# Patient Record
Sex: FEMALE | Race: WHITE | NOT HISPANIC OR LATINO
[De-identification: names, ages, dates, MRNs, and addresses within clinical notes are randomized per-mention and may not be internally consistent; named-entity substitution may affect disease eponyms.]

---

## 2017-09-21 ENCOUNTER — APPOINTMENT (OUTPATIENT)
Dept: INTERNAL MEDICINE | Facility: CLINIC | Age: 82
End: 2017-09-21
Payer: MEDICARE

## 2017-09-21 VITALS
SYSTOLIC BLOOD PRESSURE: 138 MMHG | HEIGHT: 63 IN | BODY MASS INDEX: 24.98 KG/M2 | HEART RATE: 85 BPM | WEIGHT: 141 LBS | OXYGEN SATURATION: 98 % | DIASTOLIC BLOOD PRESSURE: 79 MMHG | TEMPERATURE: 98.7 F | RESPIRATION RATE: 12 BRPM

## 2017-09-21 DIAGNOSIS — R32 UNSPECIFIED URINARY INCONTINENCE: ICD-10-CM

## 2017-09-21 PROCEDURE — 99203 OFFICE O/P NEW LOW 30 MIN: CPT

## 2017-09-21 RX ORDER — OLOPATADINE HCL 1 MG/ML
0.1 SOLUTION/ DROPS OPHTHALMIC
Qty: 5 | Refills: 0 | Status: ACTIVE | COMMUNITY
Start: 2017-06-15

## 2017-09-21 RX ORDER — LATANOPROST/PF 0.005 %
0.01 DROPS OPHTHALMIC (EYE)
Qty: 75 | Refills: 0 | Status: ACTIVE | COMMUNITY
Start: 2016-11-04

## 2018-02-06 ENCOUNTER — APPOINTMENT (OUTPATIENT)
Dept: INTERNAL MEDICINE | Facility: CLINIC | Age: 83
End: 2018-02-06
Payer: MEDICARE

## 2018-02-06 VITALS
TEMPERATURE: 97.9 F | HEART RATE: 87 BPM | BODY MASS INDEX: 24.8 KG/M2 | DIASTOLIC BLOOD PRESSURE: 80 MMHG | WEIGHT: 140 LBS | HEIGHT: 63 IN | RESPIRATION RATE: 14 BRPM | SYSTOLIC BLOOD PRESSURE: 142 MMHG | OXYGEN SATURATION: 97 %

## 2018-02-06 PROCEDURE — 99214 OFFICE O/P EST MOD 30 MIN: CPT | Mod: 25

## 2018-02-06 PROCEDURE — 36415 COLL VENOUS BLD VENIPUNCTURE: CPT

## 2018-02-09 ENCOUNTER — LABORATORY RESULT (OUTPATIENT)
Age: 83
End: 2018-02-09

## 2018-02-09 ENCOUNTER — APPOINTMENT (OUTPATIENT)
Dept: HEART AND VASCULAR | Facility: CLINIC | Age: 83
End: 2018-02-09
Payer: MEDICARE

## 2018-02-09 VITALS
DIASTOLIC BLOOD PRESSURE: 70 MMHG | TEMPERATURE: 97.8 F | OXYGEN SATURATION: 97 % | HEART RATE: 83 BPM | HEIGHT: 63 IN | SYSTOLIC BLOOD PRESSURE: 128 MMHG | WEIGHT: 140 LBS | BODY MASS INDEX: 24.8 KG/M2

## 2018-02-09 PROCEDURE — 93306 TTE W/DOPPLER COMPLETE: CPT | Mod: XE

## 2018-02-09 PROCEDURE — 99214 OFFICE O/P EST MOD 30 MIN: CPT | Mod: 25

## 2018-02-09 PROCEDURE — 93880 EXTRACRANIAL BILAT STUDY: CPT | Mod: XE

## 2018-02-09 PROCEDURE — 93000 ELECTROCARDIOGRAM COMPLETE: CPT

## 2018-02-09 RX ORDER — FLUTICASONE PROPIONATE 50 UG/1
50 SPRAY, METERED NASAL
Qty: 16 | Refills: 0 | Status: COMPLETED | COMMUNITY
Start: 2017-12-13

## 2018-02-09 RX ORDER — MELOXICAM 15 MG/1
15 TABLET ORAL
Qty: 30 | Refills: 0 | Status: COMPLETED | COMMUNITY
Start: 2017-08-11

## 2018-02-09 RX ORDER — AMOXICILLIN 875 MG/1
875 TABLET, FILM COATED ORAL
Qty: 14 | Refills: 0 | Status: COMPLETED | COMMUNITY
Start: 2017-12-13

## 2018-02-09 RX ORDER — HYDROCODONE BITARTRATE AND ACETAMINOPHEN 5; 325 MG/1; MG/1
5-325 TABLET ORAL
Qty: 5 | Refills: 0 | Status: COMPLETED | COMMUNITY
Start: 2017-11-16

## 2018-02-09 RX ORDER — CIPROFLOXACIN HYDROCHLORIDE 500 MG/1
500 TABLET, FILM COATED ORAL
Qty: 10 | Refills: 0 | Status: COMPLETED | COMMUNITY
Start: 2017-09-12

## 2018-02-12 LAB
ALBUMIN SERPL ELPH-MCNC: 4.1 G/DL
ALP BLD-CCNC: 66 U/L
ALT SERPL-CCNC: 12 U/L
ANION GAP SERPL CALC-SCNC: 16 MMOL/L
AST SERPL-CCNC: 18 U/L
BILIRUB SERPL-MCNC: 0.4 MG/DL
BUN SERPL-MCNC: 21 MG/DL
CALCIUM SERPL-MCNC: 10 MG/DL
CHLORIDE SERPL-SCNC: 102 MMOL/L
CHOLEST SERPL-MCNC: 232 MG/DL
CHOLEST/HDLC SERPL: 3.3 RATIO
CO2 SERPL-SCNC: 26 MMOL/L
CREAT SERPL-MCNC: 1.01 MG/DL
GLUCOSE SERPL-MCNC: 99 MG/DL
HDLC SERPL-MCNC: 71 MG/DL
LDLC SERPL CALC-MCNC: 140 MG/DL
POTASSIUM SERPL-SCNC: 4.2 MMOL/L
PROT SERPL-MCNC: 7 G/DL
SODIUM SERPL-SCNC: 144 MMOL/L
TRIGL SERPL-MCNC: 106 MG/DL
TSH SERPL-ACNC: 1.43 UIU/ML

## 2018-02-14 ENCOUNTER — APPOINTMENT (OUTPATIENT)
Dept: HEART AND VASCULAR | Facility: CLINIC | Age: 83
End: 2018-02-14
Payer: MEDICARE

## 2018-02-14 VITALS
HEIGHT: 63 IN | DIASTOLIC BLOOD PRESSURE: 60 MMHG | WEIGHT: 142 LBS | BODY MASS INDEX: 25.16 KG/M2 | HEART RATE: 91 BPM | SYSTOLIC BLOOD PRESSURE: 124 MMHG

## 2018-02-14 PROCEDURE — 93000 ELECTROCARDIOGRAM COMPLETE: CPT

## 2018-02-14 PROCEDURE — 99205 OFFICE O/P NEW HI 60 MIN: CPT | Mod: 25

## 2018-02-15 ENCOUNTER — APPOINTMENT (OUTPATIENT)
Dept: HEART AND VASCULAR | Facility: CLINIC | Age: 83
End: 2018-02-15
Payer: MEDICARE

## 2018-02-15 DIAGNOSIS — R06.02 SHORTNESS OF BREATH: ICD-10-CM

## 2018-02-15 PROCEDURE — 99214 OFFICE O/P EST MOD 30 MIN: CPT | Mod: 25

## 2018-02-15 PROCEDURE — 93015 CV STRESS TEST SUPVJ I&R: CPT

## 2018-02-15 PROCEDURE — A9500: CPT

## 2018-02-15 PROCEDURE — 78452 HT MUSCLE IMAGE SPECT MULT: CPT

## 2018-03-02 ENCOUNTER — TRANSCRIPTION ENCOUNTER (OUTPATIENT)
Age: 83
End: 2018-03-02

## 2018-03-02 ENCOUNTER — APPOINTMENT (OUTPATIENT)
Dept: CT IMAGING | Facility: HOSPITAL | Age: 83
End: 2018-03-02

## 2018-03-02 ENCOUNTER — INPATIENT (INPATIENT)
Facility: HOSPITAL | Age: 83
LOS: 0 days | Discharge: HOME | DRG: 274 | End: 2018-03-03
Attending: INTERNAL MEDICINE | Admitting: INTERNAL MEDICINE
Payer: MEDICARE

## 2018-03-02 VITALS
WEIGHT: 139.99 LBS | RESPIRATION RATE: 16 BRPM | HEART RATE: 94 BPM | SYSTOLIC BLOOD PRESSURE: 156 MMHG | HEIGHT: 64 IN | DIASTOLIC BLOOD PRESSURE: 72 MMHG | OXYGEN SATURATION: 99 %

## 2018-03-02 DIAGNOSIS — I49.3 VENTRICULAR PREMATURE DEPOLARIZATION: ICD-10-CM

## 2018-03-02 DIAGNOSIS — H40.9 UNSPECIFIED GLAUCOMA: ICD-10-CM

## 2018-03-02 LAB
ALBUMIN SERPL ELPH-MCNC: 3.9 G/DL — SIGNIFICANT CHANGE UP (ref 3.3–5)
ALP SERPL-CCNC: 62 U/L — SIGNIFICANT CHANGE UP (ref 40–120)
ALT FLD-CCNC: 9 U/L — LOW (ref 10–45)
ANION GAP SERPL CALC-SCNC: 12 MMOL/L — SIGNIFICANT CHANGE UP (ref 5–17)
APTT BLD: 28.9 SEC — SIGNIFICANT CHANGE UP (ref 27.5–37.4)
AST SERPL-CCNC: 15 U/L — SIGNIFICANT CHANGE UP (ref 10–40)
BILIRUB SERPL-MCNC: 0.7 MG/DL — SIGNIFICANT CHANGE UP (ref 0.2–1.2)
BUN SERPL-MCNC: 16 MG/DL — SIGNIFICANT CHANGE UP (ref 7–23)
CALCIUM SERPL-MCNC: 9.9 MG/DL — SIGNIFICANT CHANGE UP (ref 8.4–10.5)
CHLORIDE SERPL-SCNC: 101 MMOL/L — SIGNIFICANT CHANGE UP (ref 96–108)
CO2 SERPL-SCNC: 28 MMOL/L — SIGNIFICANT CHANGE UP (ref 22–31)
CREAT SERPL-MCNC: 0.89 MG/DL — SIGNIFICANT CHANGE UP (ref 0.5–1.3)
GLUCOSE SERPL-MCNC: 118 MG/DL — HIGH (ref 70–99)
HCT VFR BLD CALC: 41 % — SIGNIFICANT CHANGE UP (ref 34.5–45)
HGB BLD-MCNC: 13.4 G/DL — SIGNIFICANT CHANGE UP (ref 11.5–15.5)
INR BLD: 0.94 — SIGNIFICANT CHANGE UP (ref 0.88–1.16)
MCHC RBC-ENTMCNC: 30.5 PG — SIGNIFICANT CHANGE UP (ref 27–34)
MCHC RBC-ENTMCNC: 32.7 G/DL — SIGNIFICANT CHANGE UP (ref 32–36)
MCV RBC AUTO: 93.2 FL — SIGNIFICANT CHANGE UP (ref 80–100)
PLATELET # BLD AUTO: 156 K/UL — SIGNIFICANT CHANGE UP (ref 150–400)
POTASSIUM SERPL-MCNC: 4.1 MMOL/L — SIGNIFICANT CHANGE UP (ref 3.5–5.3)
POTASSIUM SERPL-SCNC: 4.1 MMOL/L — SIGNIFICANT CHANGE UP (ref 3.5–5.3)
PROT SERPL-MCNC: 7.3 G/DL — SIGNIFICANT CHANGE UP (ref 6–8.3)
PROTHROM AB SERPL-ACNC: 10.4 SEC — SIGNIFICANT CHANGE UP (ref 9.8–12.7)
RBC # BLD: 4.4 M/UL — SIGNIFICANT CHANGE UP (ref 3.8–5.2)
RBC # FLD: 13.9 % — SIGNIFICANT CHANGE UP (ref 10.3–16.9)
SODIUM SERPL-SCNC: 141 MMOL/L — SIGNIFICANT CHANGE UP (ref 135–145)
WBC # BLD: 7.4 K/UL — SIGNIFICANT CHANGE UP (ref 3.8–10.5)
WBC # FLD AUTO: 7.4 K/UL — SIGNIFICANT CHANGE UP (ref 3.8–10.5)

## 2018-03-02 PROCEDURE — 93653 COMPRE EP EVAL TX SVT: CPT

## 2018-03-02 PROCEDURE — 99223 1ST HOSP IP/OBS HIGH 75: CPT | Mod: 57

## 2018-03-02 PROCEDURE — 71250 CT THORAX DX C-: CPT | Mod: 26

## 2018-03-02 PROCEDURE — 93613 INTRACARDIAC EPHYS 3D MAPG: CPT

## 2018-03-02 PROCEDURE — 99223 1ST HOSP IP/OBS HIGH 75: CPT

## 2018-03-02 PROCEDURE — 93621 COMP EP EVL L PAC&REC C SINS: CPT | Mod: 26

## 2018-03-02 PROCEDURE — 33282: CPT

## 2018-03-02 RX ORDER — ASPIRIN/CALCIUM CARB/MAGNESIUM 325 MG
325 TABLET ORAL ONCE
Qty: 0 | Refills: 0 | Status: DISCONTINUED | OUTPATIENT
Start: 2018-03-02 | End: 2018-03-02

## 2018-03-02 RX ORDER — KETOTIFEN FUMARATE 0.34 MG/ML
1 SOLUTION OPHTHALMIC DAILY
Qty: 0 | Refills: 0 | Status: DISCONTINUED | OUTPATIENT
Start: 2018-03-02 | End: 2018-03-03

## 2018-03-02 RX ORDER — MEXILETINE HYDROCHLORIDE 150 MG/1
150 CAPSULE ORAL THREE TIMES A DAY
Qty: 0 | Refills: 0 | Status: DISCONTINUED | OUTPATIENT
Start: 2018-03-02 | End: 2018-03-03

## 2018-03-02 RX ORDER — MEXILETINE HYDROCHLORIDE 150 MG/1
1 CAPSULE ORAL
Qty: 90 | Refills: 1
Start: 2018-03-02 | End: 2018-04-30

## 2018-03-02 RX ORDER — METOPROLOL TARTRATE 50 MG
1 TABLET ORAL
Qty: 0 | Refills: 0 | COMMUNITY

## 2018-03-02 RX ORDER — METOPROLOL TARTRATE 50 MG
1 TABLET ORAL
Qty: 30 | Refills: 2
Start: 2018-03-02 | End: 2018-05-30

## 2018-03-02 RX ORDER — METOPROLOL TARTRATE 50 MG
50 TABLET ORAL DAILY
Qty: 0 | Refills: 0 | Status: DISCONTINUED | OUTPATIENT
Start: 2018-03-02 | End: 2018-03-03

## 2018-03-02 RX ORDER — LATANOPROST 0.05 MG/ML
1 SOLUTION/ DROPS OPHTHALMIC; TOPICAL ONCE
Qty: 0 | Refills: 0 | Status: COMPLETED | OUTPATIENT
Start: 2018-03-02 | End: 2018-03-02

## 2018-03-02 RX ORDER — ASPIRIN/CALCIUM CARB/MAGNESIUM 324 MG
325 TABLET ORAL ONCE
Qty: 0 | Refills: 0 | Status: COMPLETED | OUTPATIENT
Start: 2018-03-02 | End: 2018-03-02

## 2018-03-02 RX ORDER — METOPROLOL TARTRATE 50 MG
25 TABLET ORAL ONCE
Qty: 0 | Refills: 0 | Status: DISCONTINUED | OUTPATIENT
Start: 2018-03-02 | End: 2018-03-02

## 2018-03-02 RX ORDER — ASPIRIN/CALCIUM CARB/MAGNESIUM 324 MG
81 TABLET ORAL ONCE
Qty: 0 | Refills: 0 | Status: DISCONTINUED | OUTPATIENT
Start: 2018-03-02 | End: 2018-03-02

## 2018-03-02 RX ORDER — ASPIRIN/CALCIUM CARB/MAGNESIUM 324 MG
81 TABLET ORAL DAILY
Qty: 0 | Refills: 0 | Status: DISCONTINUED | OUTPATIENT
Start: 2018-03-03 | End: 2018-03-03

## 2018-03-02 RX ADMIN — Medication 325 MILLIGRAM(S): at 18:03

## 2018-03-02 RX ADMIN — Medication 50 MILLIGRAM(S): at 18:03

## 2018-03-02 RX ADMIN — KETOTIFEN FUMARATE 1 DROP(S): 0.34 SOLUTION OPHTHALMIC at 18:04

## 2018-03-02 RX ADMIN — LATANOPROST 1 DROP(S): 0.05 SOLUTION/ DROPS OPHTHALMIC; TOPICAL at 21:40

## 2018-03-02 RX ADMIN — MEXILETINE HYDROCHLORIDE 150 MILLIGRAM(S): 150 CAPSULE ORAL at 21:40

## 2018-03-02 NOTE — DISCHARGE NOTE ADULT - PLAN OF CARE
Follow up with Dr. Horvath on 4/11/18 at 11 AM You had an Electrophysiology study on 3/2/18 that didn't show dangerous heart rhythm or conduction disease that could cause your fainting spells.  An Implanted Loop Recorder was implanted for long term heart rhythm monitor to aid with diagnosis of your fainting spells.  There was attempt to ablate the PVC (early heart beats) but was unsuccessful.  Dr. Horvath would like to start a heart rhythm medication call Mexilitine to decrease the PVC burden.  This medication is to be taken three times a day.      Wound care instruction:  Avoid strenuous activities such as lifting, running, pushing or sex for 1 week in order to avoid bleeding complications in the groin wounds.  If any questions about the groin, call us at [phone].  Ok to shower tonight.  Avoid bathing for 1 week    Follow up with DR. Horvath on 4/11/18 at 11 AM You now have a Loop Recorder implanted that can help check your heart rhythm.   There is a layer of glue on the small wound (on the chest wall). Let the glue fall off on its own. Do no peel it off. You had an Electrophysiology study on 3/2/18 that didn't show dangerous heart rhythm or conduction disease that could cause your fainting spells.  An Implanted Loop Recorder was implanted for long term heart rhythm monitor to aid with diagnosis of your fainting spells.  There was attempt to ablate the PVC (early heart beats) but was unsuccessful.  Dr. Horvath would like to start a heart rhythm medication call Mexilitine to decrease the PVC burden.  This medication is to be taken three times a day.    Also, Metoprolol was doubled from 25 mg to 50 mg once daily.   ** Prescriptions are sent electronically to [facility].   Wound care instruction:  Avoid strenuous activities such as lifting, running, pushing or sex for 1 week in order to avoid bleeding complications in the groin wounds.  If any questions about the groin, call us at [phone].  Ok to shower tonight.  Avoid bathing for 1 week    Follow up with DR. Horvath on 4/11/18 at 11 AM

## 2018-03-02 NOTE — DISCHARGE NOTE ADULT - MEDICATION SUMMARY - MEDICATIONS TO TAKE
I will START or STAY ON the medications listed below when I get home from the hospital:    Aspirin Enteric Coated 81 mg oral delayed release tablet  -- 1 tab(s) by mouth once a day  -- Indication: For heart    mexiletine 150 mg oral capsule  -- 1 cap(s) by mouth 3 times a day  ** new med  -- Indication: For for arrhythmia    metoprolol succinate 50 mg oral tablet, extended release  -- 1 tab(s) by mouth once a day  ** dose increased   -- Indication: For heart rate    latanoprost 0.005% ophthalmic solution  -- 1 drop(s) to each affected eye once a day (in the evening)  -- Indication: For eyes

## 2018-03-02 NOTE — DISCHARGE NOTE ADULT - CARE PLAN
Principal Discharge DX:	PVC (premature ventricular contraction)  Goal:	Follow up with Dr. Horvath on 4/11/18 at 11 AM  Assessment and plan of treatment:	You had an Electrophysiology study on 3/2/18 that didn't show dangerous heart rhythm or conduction disease that could cause your fainting spells.  An Implanted Loop Recorder was implanted for long term heart rhythm monitor to aid with diagnosis of your fainting spells.  There was attempt to ablate the PVC (early heart beats) but was unsuccessful.  Dr. Horvath would like to start a heart rhythm medication call Mexilitine to decrease the PVC burden.  This medication is to be taken three times a day.      Wound care instruction:  Avoid strenuous activities such as lifting, running, pushing or sex for 1 week in order to avoid bleeding complications in the groin wounds.  If any questions about the groin, call us at [phone].  Ok to shower tonight.  Avoid bathing for 1 week    Follow up with DR. Horvath on 4/11/18 at 11 AM  Secondary Diagnosis:	Syncope and collapse  Assessment and plan of treatment:	You now have a Loop Recorder implanted that can help check your heart rhythm.   There is a layer of glue on the small wound (on the chest wall). Let the glue fall off on its own. Do no peel it off. Principal Discharge DX:	PVC (premature ventricular contraction)  Goal:	Follow up with Dr. Horvath on 4/11/18 at 11 AM  Assessment and plan of treatment:	You had an Electrophysiology study on 3/2/18 that didn't show dangerous heart rhythm or conduction disease that could cause your fainting spells.  An Implanted Loop Recorder was implanted for long term heart rhythm monitor to aid with diagnosis of your fainting spells.  There was attempt to ablate the PVC (early heart beats) but was unsuccessful.  Dr. Horvath would like to start a heart rhythm medication call Mexilitine to decrease the PVC burden.  This medication is to be taken three times a day.    Also, Metoprolol was doubled from 25 mg to 50 mg once daily.   ** Prescriptions are sent electronically to [facility].   Wound care instruction:  Avoid strenuous activities such as lifting, running, pushing or sex for 1 week in order to avoid bleeding complications in the groin wounds.  If any questions about the groin, call us at [phone].  Ok to shower tonight.  Avoid bathing for 1 week    Follow up with DR. Horvath on 4/11/18 at 11 AM  Secondary Diagnosis:	Syncope and collapse  Assessment and plan of treatment:	You now have a Loop Recorder implanted that can help check your heart rhythm.   There is a layer of glue on the small wound (on the chest wall). Let the glue fall off on its own. Do no peel it off.

## 2018-03-02 NOTE — DISCHARGE NOTE ADULT - CARE PROVIDER_API CALL
Buddy Horvath), Cardiac Electrophysiology; Cardiology; Cardiovascular Disease  [address] [place] [address]  Phone: [phone]  Fax: [phone]

## 2018-03-02 NOTE — H&P ADULT - PMH
Cerebral aneurysm    Glaucoma    Osteoarthritis    PVC (premature ventricular contraction)    Syncope and collapse

## 2018-03-02 NOTE — H&P ADULT - PROBLEM SELECTOR PLAN 1
- Patient presents today for procedure as above.   - Continue home meds, Metoprolol Succ ER 25mg Daily. - Patient presents today for procedure as above.   - Increase home Toprol to 50mg Daily, continue this dose on discharge.  - Start Mexiletine 150mg TID. Continue on d/c  - ASA 325mg on day of procedure, resume home dose ASA 81mg daily upon d/c.

## 2018-03-02 NOTE — DISCHARGE NOTE ADULT - HOSPITAL COURSE
83 F with OA, glaucoma, cerebral aneurysm (s/p stenting, follows with neurosurgery), incontinence, SOB, and syncope. The patient has experienced LOC several times, most recently 2-3 weeks ago she struck her face when falling. No prodrome. Holter monitor outpatient showed PVCs. She presented here for elective EPS and ablation and Loop Recorder implant 3/2:  s/p EPS that was negative for arrhythmia or conduction disease.  PVC ablation was unsuccessful. Loop Recorder (LINQ) was implanted. She was started on Mexilitine 150mg TID and  increased on Toprol to 50mg daily. Continues on ASA. f/u with DR. Horvath on 4/11/18 at 11 AM

## 2018-03-02 NOTE — H&P ADULT - ASSESSMENT
Patient is a pleasant 82yo Female with PMH of OA, glaucoma (s/p repair surgery), cerebral aneurysm (s/p stenting, follows with neurosurgery), incontinence, SOB, and syncope. The patient has experienced syncope and collapse several times, most recently 2-3 weeks ago she struck her face when falling. She has no prodrome. The patient endorses knee and back pain recently that she attributes to her arthritis. She wore a Holter monitor which showed PVCs. The patient denies fever, chills, palpitations, diarrhea, nausea, vomiting. The patient presents today for [employer] Mapping, possible PVC ablation/ loop recorder implant/ PPM implant.     - Procedures discussed in detail with all patients. Risks, benefits, and alternatives were reviewed. Informed consent was signed in the office. All questions were answered. The patient wishes to proceed as planned. Patient is a pleasant 84yo Female with PMH of OA, glaucoma (s/p repair surgery), cerebral aneurysm (s/p stenting, follows with neurosurgery), incontinence, SOB, and syncope. The patient has experienced syncope and collapse several times, most recently 2-3 weeks ago she struck her face when falling. She has no prodrome. The patient endorses knee and back pain recently that she attributes to her arthritis. She wore a Holter monitor which showed PVCs. The patient denies fever, chills, palpitations, diarrhea, nausea, vomiting. The patient presents today for CarioInsight Mapping, possible PVC ablation/ loop recorder implant/ PPM implant.     - Procedures were discussed in detail with patients. Risks, benefits, and alternatives were reviewed. Informed consent was signed in the office. All questions were answered. The patient wishes to proceed as planned.

## 2018-03-02 NOTE — H&P ADULT - HISTORY OF PRESENT ILLNESS
Patient is a pleasant 82yo Female with PMH of OA, glaucoma (s/p repair surgery), cerebral aneurysm (s/p stenting, follows with neurosurgery), incontinence, SOB, and syncope. The patient has experienced syncope and collapse several times, most recently 2-3 weeks ago she struck her face when falling. She has no prodrome. The patient endorses knee and back pain recently that she attributes to her arthritis. She wore a Holter monitor which showed PVCs. The patient denies fever, chills, palpitations, diarrhea, nausea, vomiting.

## 2018-03-02 NOTE — DISCHARGE NOTE ADULT - PATIENT PORTAL LINK FT
You can access the [facility] Patient Portal, offered by [facility], by registering with the following website: [email or web address]

## 2018-03-02 NOTE — DISCHARGE NOTE ADULT - MEDICATION SUMMARY - MEDICATIONS TO CHANGE
I will SWITCH the dose or number of times a day I take the medications listed below when I get home from the hospital:    Metoprolol Succinate ER 25 mg oral tablet, extended release  -- 1 tab(s) by mouth once a day

## 2018-03-03 VITALS — TEMPERATURE: 97 F

## 2018-03-03 PROCEDURE — 99239 HOSP IP/OBS DSCHRG MGMT >30: CPT

## 2018-03-03 PROCEDURE — 99238 HOSP IP/OBS DSCHRG MGMT 30/<: CPT

## 2018-03-03 RX ORDER — OLOPATADINE HYDROCHLORIDE 1 MG/ML
1 SOLUTION/ DROPS OPHTHALMIC
Qty: 0 | Refills: 0 | COMMUNITY

## 2018-03-03 RX ORDER — INFLUENZA VIRUS VACCINE 15; 15; 15; 15 UG/.5ML; UG/.5ML; UG/.5ML; UG/.5ML
0.5 SUSPENSION INTRAMUSCULAR ONCE
Qty: 0 | Refills: 0 | Status: COMPLETED | OUTPATIENT
Start: 2018-03-03 | End: 2018-03-03

## 2018-03-03 RX ADMIN — Medication 81 MILLIGRAM(S): at 11:44

## 2018-03-03 RX ADMIN — KETOTIFEN FUMARATE 1 DROP(S): 0.34 SOLUTION OPHTHALMIC at 14:10

## 2018-03-03 RX ADMIN — MEXILETINE HYDROCHLORIDE 150 MILLIGRAM(S): 150 CAPSULE ORAL at 14:10

## 2018-03-03 RX ADMIN — MEXILETINE HYDROCHLORIDE 150 MILLIGRAM(S): 150 CAPSULE ORAL at 05:52

## 2018-03-03 RX ADMIN — INFLUENZA VIRUS VACCINE 0.5 MILLILITER(S): 15; 15; 15; 15 SUSPENSION INTRAMUSCULAR at 13:57

## 2018-03-03 RX ADMIN — Medication 50 MILLIGRAM(S): at 05:52

## 2018-03-06 PROCEDURE — C1732: CPT

## 2018-03-06 PROCEDURE — 86850 RBC ANTIBODY SCREEN: CPT

## 2018-03-06 PROCEDURE — C1764: CPT

## 2018-03-06 PROCEDURE — C1730: CPT

## 2018-03-06 PROCEDURE — C1894: CPT

## 2018-03-06 PROCEDURE — 86901 BLOOD TYPING SEROLOGIC RH(D): CPT

## 2018-03-06 PROCEDURE — 86900 BLOOD TYPING SEROLOGIC ABO: CPT

## 2018-03-06 PROCEDURE — 85730 THROMBOPLASTIN TIME PARTIAL: CPT

## 2018-03-06 PROCEDURE — 85027 COMPLETE CBC AUTOMATED: CPT

## 2018-03-06 PROCEDURE — 71250 CT THORAX DX C-: CPT

## 2018-03-06 PROCEDURE — 80053 COMPREHEN METABOLIC PANEL: CPT

## 2018-03-06 PROCEDURE — 90662 IIV NO PRSV INCREASED AG IM: CPT

## 2018-03-06 PROCEDURE — C1759: CPT

## 2018-03-06 PROCEDURE — 85610 PROTHROMBIN TIME: CPT

## 2018-03-12 DIAGNOSIS — I49.3 VENTRICULAR PREMATURE DEPOLARIZATION: ICD-10-CM

## 2018-03-12 DIAGNOSIS — I47.2 VENTRICULAR TACHYCARDIA: ICD-10-CM

## 2018-03-12 DIAGNOSIS — I10 ESSENTIAL (PRIMARY) HYPERTENSION: ICD-10-CM

## 2018-03-12 DIAGNOSIS — H40.9 UNSPECIFIED GLAUCOMA: ICD-10-CM

## 2018-03-12 DIAGNOSIS — R55 SYNCOPE AND COLLAPSE: ICD-10-CM

## 2018-03-12 DIAGNOSIS — M15.9 POLYOSTEOARTHRITIS, UNSPECIFIED: ICD-10-CM

## 2018-04-11 ENCOUNTER — APPOINTMENT (OUTPATIENT)
Dept: HEART AND VASCULAR | Facility: CLINIC | Age: 83
End: 2018-04-11
Payer: MEDICARE

## 2018-04-11 VITALS
HEIGHT: 63 IN | SYSTOLIC BLOOD PRESSURE: 145 MMHG | DIASTOLIC BLOOD PRESSURE: 63 MMHG | BODY MASS INDEX: 24.8 KG/M2 | HEART RATE: 70 BPM | WEIGHT: 140 LBS

## 2018-04-11 PROBLEM — M19.90 UNSPECIFIED OSTEOARTHRITIS, UNSPECIFIED SITE: Chronic | Status: ACTIVE | Noted: 2018-03-02

## 2018-04-11 PROBLEM — I49.3 VENTRICULAR PREMATURE DEPOLARIZATION: Chronic | Status: ACTIVE | Noted: 2018-03-02

## 2018-04-11 PROBLEM — R55 SYNCOPE AND COLLAPSE: Chronic | Status: ACTIVE | Noted: 2018-03-02

## 2018-04-11 PROBLEM — H40.9 UNSPECIFIED GLAUCOMA: Chronic | Status: ACTIVE | Noted: 2018-03-02

## 2018-04-11 PROBLEM — I67.1 CEREBRAL ANEURYSM, NONRUPTURED: Chronic | Status: ACTIVE | Noted: 2018-03-02

## 2018-04-11 PROCEDURE — 93291 INTERROG DEV EVAL SCRMS IP: CPT

## 2018-04-16 ENCOUNTER — FORM ENCOUNTER (OUTPATIENT)
Age: 83
End: 2018-04-16

## 2018-04-16 ENCOUNTER — APPOINTMENT (OUTPATIENT)
Dept: INTERNAL MEDICINE | Facility: CLINIC | Age: 83
End: 2018-04-16
Payer: MEDICARE

## 2018-04-16 VITALS
SYSTOLIC BLOOD PRESSURE: 120 MMHG | BODY MASS INDEX: 24.8 KG/M2 | WEIGHT: 140 LBS | HEART RATE: 91 BPM | TEMPERATURE: 97.9 F | DIASTOLIC BLOOD PRESSURE: 75 MMHG | HEIGHT: 63 IN | OXYGEN SATURATION: 94 % | RESPIRATION RATE: 14 BRPM

## 2018-04-16 DIAGNOSIS — R29.6 REPEATED FALLS: ICD-10-CM

## 2018-04-16 PROCEDURE — 99214 OFFICE O/P EST MOD 30 MIN: CPT

## 2018-04-17 ENCOUNTER — OUTPATIENT (OUTPATIENT)
Dept: OUTPATIENT SERVICES | Facility: HOSPITAL | Age: 83
LOS: 1 days | End: 2018-04-17
Payer: MEDICARE

## 2018-04-17 PROCEDURE — 73521 X-RAY EXAM HIPS BI 2 VIEWS: CPT | Mod: 26

## 2018-04-17 PROCEDURE — 73521 X-RAY EXAM HIPS BI 2 VIEWS: CPT

## 2018-10-24 ENCOUNTER — APPOINTMENT (OUTPATIENT)
Dept: HEART AND VASCULAR | Facility: CLINIC | Age: 83
End: 2018-10-24
Payer: MEDICARE

## 2018-10-24 VITALS
HEIGHT: 63 IN | SYSTOLIC BLOOD PRESSURE: 146 MMHG | BODY MASS INDEX: 24.8 KG/M2 | WEIGHT: 140 LBS | DIASTOLIC BLOOD PRESSURE: 67 MMHG | HEART RATE: 76 BPM

## 2018-10-24 PROCEDURE — 93291 INTERROG DEV EVAL SCRMS IP: CPT

## 2018-10-24 PROCEDURE — 99214 OFFICE O/P EST MOD 30 MIN: CPT | Mod: 25

## 2018-11-29 ENCOUNTER — MESSAGE (OUTPATIENT)
Age: 83
End: 2018-11-29

## 2019-01-03 ENCOUNTER — APPOINTMENT (OUTPATIENT)
Dept: VASCULAR SURGERY | Facility: CLINIC | Age: 84
End: 2019-01-03
Payer: MEDICARE

## 2019-01-03 PROCEDURE — 93880 EXTRACRANIAL BILAT STUDY: CPT

## 2019-01-16 ENCOUNTER — RX RENEWAL (OUTPATIENT)
Age: 84
End: 2019-01-16

## 2019-01-16 ENCOUNTER — APPOINTMENT (OUTPATIENT)
Dept: INTERNAL MEDICINE | Facility: CLINIC | Age: 84
End: 2019-01-16
Payer: MEDICARE

## 2019-01-16 VITALS
TEMPERATURE: 97.7 F | BODY MASS INDEX: 24.45 KG/M2 | WEIGHT: 138 LBS | SYSTOLIC BLOOD PRESSURE: 148 MMHG | DIASTOLIC BLOOD PRESSURE: 80 MMHG | OXYGEN SATURATION: 95 % | HEIGHT: 63 IN | HEART RATE: 98 BPM

## 2019-01-16 DIAGNOSIS — R55 SYNCOPE AND COLLAPSE: ICD-10-CM

## 2019-01-16 DIAGNOSIS — I49.3 VENTRICULAR PREMATURE DEPOLARIZATION: ICD-10-CM

## 2019-01-16 PROCEDURE — 99214 OFFICE O/P EST MOD 30 MIN: CPT

## 2019-01-16 NOTE — PLAN
[FreeTextEntry1] : \par \par BP HR elevated, inc metoprolol to 50 mg daily\par \par referral for [facility] for knee pain and back pain evaluation\par  send for xray left kne\par tylenol prn for pain

## 2019-01-16 NOTE — PHYSICAL EXAM
[No Acute Distress] : no acute distress [Well Nourished] : well nourished [Well Developed] : well developed [Supple] : supple [No Lymphadenopathy] : no lymphadenopathy [No Respiratory Distress] : no respiratory distress  [Clear to Auscultation] : lungs were clear to auscultation bilaterally [No Accessory Muscle Use] : no accessory muscle use [Normal Rate] : normal rate  [Regular Rhythm] : with a regular rhythm [Normal S1, S2] : normal S1 and S2 [No CVA Tenderness] : no CVA  tenderness [No Spinal Tenderness] : no spinal tenderness [No Joint Swelling] : no joint swelling [Grossly Normal Strength/Tone] : grossly normal strength/tone [No Skin Lesions] : no skin lesions

## 2019-01-16 NOTE — HISTORY OF PRESENT ILLNESS
[FreeTextEntry1] : left knee pain [de-identified] : Saw Dr Nelson for back pain and also with knee pain Was not helpful.  TOld she had a vertebral fracture. \par left knee pain or months - Has a hx of torn menixscus with pt years ago. \par Not taking any pain medications\par \par

## 2019-01-17 ENCOUNTER — FORM ENCOUNTER (OUTPATIENT)
Age: 84
End: 2019-01-17

## 2019-01-18 ENCOUNTER — OUTPATIENT (OUTPATIENT)
Dept: OUTPATIENT SERVICES | Facility: HOSPITAL | Age: 84
LOS: 1 days | End: 2019-01-18
Payer: MEDICARE

## 2019-01-18 PROCEDURE — 73562 X-RAY EXAM OF KNEE 3: CPT

## 2019-01-18 PROCEDURE — 73562 X-RAY EXAM OF KNEE 3: CPT | Mod: 26,LT

## 2019-03-04 ENCOUNTER — RX RENEWAL (OUTPATIENT)
Age: 84
End: 2019-03-04

## 2019-04-17 ENCOUNTER — APPOINTMENT (OUTPATIENT)
Dept: INTERNAL MEDICINE | Facility: CLINIC | Age: 84
End: 2019-04-17
Payer: MEDICARE

## 2019-04-17 VITALS
HEART RATE: 75 BPM | HEIGHT: 63 IN | DIASTOLIC BLOOD PRESSURE: 60 MMHG | BODY MASS INDEX: 24.1 KG/M2 | TEMPERATURE: 98.6 F | WEIGHT: 136 LBS | OXYGEN SATURATION: 95 % | RESPIRATION RATE: 14 BRPM | SYSTOLIC BLOOD PRESSURE: 140 MMHG

## 2019-04-17 PROCEDURE — 99397 PER PM REEVAL EST PAT 65+ YR: CPT

## 2019-04-17 PROCEDURE — 93000 ELECTROCARDIOGRAM COMPLETE: CPT

## 2019-04-17 PROCEDURE — 36415 COLL VENOUS BLD VENIPUNCTURE: CPT

## 2019-04-18 NOTE — HISTORY OF PRESENT ILLNESS
[de-identified] : Recent left eye hemorrhage - seeing ophtho - vision mildly affected. \par Still with back pain and knee pain.  Wearing brace daily.  PT did not help much.  \par Has lost4 lbs.- not trying to lose weight.  Eating a lot.  \par Using a cane for walking. \par  [FreeTextEntry1] : wellness

## 2019-04-18 NOTE — REVIEW OF SYSTEMS
[Muscle Pain] : muscle pain [Joint Pain] : joint pain [Joint Stiffness] : joint stiffness [Back Pain] : back pain [Negative] : Psychiatric

## 2019-04-18 NOTE — PHYSICAL EXAM
[Well Developed] : well developed [No Acute Distress] : no acute distress [Well Nourished] : well nourished [No Lymphadenopathy] : no lymphadenopathy [Supple] : supple [No Respiratory Distress] : no respiratory distress  [Clear to Auscultation] : lungs were clear to auscultation bilaterally [Normal Rate] : normal rate  [No Accessory Muscle Use] : no accessory muscle use [Regular Rhythm] : with a regular rhythm [Normal S1, S2] : normal S1 and S2 [No Spinal Tenderness] : no spinal tenderness [No CVA Tenderness] : no CVA  tenderness [No Joint Swelling] : no joint swelling [Grossly Normal Strength/Tone] : grossly normal strength/tone [No Skin Lesions] : no skin lesions

## 2019-04-18 NOTE — PLAN
[FreeTextEntry1] : stephanie complete\par labs today\par try using the stationary bike and monitor her knee\par \par Cv:following with Dr SHARIF routinely, no further syncopal episodes\par \par

## 2019-04-21 LAB
ALBUMIN SERPL ELPH-MCNC: 4.1 G/DL
ALP BLD-CCNC: 75 U/L
ALT SERPL-CCNC: 13 U/L
ANION GAP SERPL CALC-SCNC: 13 MMOL/L
APPEARANCE: ABNORMAL
AST SERPL-CCNC: 18 U/L
BASOPHILS # BLD AUTO: 0.06 K/UL
BASOPHILS NFR BLD AUTO: 0.9 %
BILIRUB SERPL-MCNC: 0.3 MG/DL
BILIRUBIN URINE: NEGATIVE
BLOOD URINE: NEGATIVE
BUN SERPL-MCNC: 17 MG/DL
CALCIUM SERPL-MCNC: 9.6 MG/DL
CHLORIDE SERPL-SCNC: 103 MMOL/L
CHOLEST SERPL-MCNC: 217 MG/DL
CHOLEST/HDLC SERPL: 3.2 RATIO
CO2 SERPL-SCNC: 28 MMOL/L
COLOR: YELLOW
CREAT SERPL-MCNC: 0.92 MG/DL
EOSINOPHIL # BLD AUTO: 0.2 K/UL
EOSINOPHIL NFR BLD AUTO: 3 %
ESTIMATED AVERAGE GLUCOSE: 117 MG/DL
GLUCOSE QUALITATIVE U: NEGATIVE
GLUCOSE SERPL-MCNC: 90 MG/DL
HBA1C MFR BLD HPLC: 5.7 %
HCT VFR BLD CALC: 41.6 %
HDLC SERPL-MCNC: 68 MG/DL
HGB BLD-MCNC: 12.7 G/DL
IMM GRANULOCYTES NFR BLD AUTO: 0.3 %
KETONES URINE: NEGATIVE
LDLC SERPL CALC-MCNC: 134 MG/DL
LEUKOCYTE ESTERASE URINE: ABNORMAL
LYMPHOCYTES # BLD AUTO: 2.16 K/UL
LYMPHOCYTES NFR BLD AUTO: 32.1 %
MAN DIFF?: NORMAL
MCHC RBC-ENTMCNC: 30.2 PG
MCHC RBC-ENTMCNC: 30.5 GM/DL
MCV RBC AUTO: 98.8 FL
MONOCYTES # BLD AUTO: 0.52 K/UL
MONOCYTES NFR BLD AUTO: 7.7 %
NEUTROPHILS # BLD AUTO: 3.77 K/UL
NEUTROPHILS NFR BLD AUTO: 56 %
NITRITE URINE: NEGATIVE
PH URINE: 5.5
PLATELET # BLD AUTO: 186 K/UL
POTASSIUM SERPL-SCNC: 3.7 MMOL/L
PROT SERPL-MCNC: 7.1 G/DL
PROTEIN URINE: NEGATIVE
RBC # BLD: 4.21 M/UL
RBC # FLD: 14.1 %
SODIUM SERPL-SCNC: 143 MMOL/L
SPECIFIC GRAVITY URINE: 1.01
TRIGL SERPL-MCNC: 73 MG/DL
UROBILINOGEN URINE: NORMAL
VIT B12 SERPL-MCNC: 232 PG/ML
WBC # FLD AUTO: 6.73 K/UL

## 2019-06-25 ENCOUNTER — APPOINTMENT (OUTPATIENT)
Dept: INTERNAL MEDICINE | Facility: CLINIC | Age: 84
End: 2019-06-25
Payer: MEDICARE

## 2019-06-25 VITALS
SYSTOLIC BLOOD PRESSURE: 122 MMHG | HEIGHT: 63 IN | RESPIRATION RATE: 14 BRPM | HEART RATE: 85 BPM | TEMPERATURE: 98.3 F | BODY MASS INDEX: 21.26 KG/M2 | OXYGEN SATURATION: 96 % | WEIGHT: 120 LBS | DIASTOLIC BLOOD PRESSURE: 76 MMHG

## 2019-06-25 DIAGNOSIS — R10.84 GENERALIZED ABDOMINAL PAIN: ICD-10-CM

## 2019-06-25 DIAGNOSIS — R63.4 ABNORMAL WEIGHT LOSS: ICD-10-CM

## 2019-06-25 PROCEDURE — 99214 OFFICE O/P EST MOD 30 MIN: CPT

## 2019-06-25 NOTE — PLAN
[FreeTextEntry1] : Back pain- lumbar fracture-  send for PMR eval with Dr Boston\par \par Abd pain and weight loss - etiology unclear - send for CT a/p  \par \par

## 2019-06-25 NOTE — HISTORY OF PRESENT ILLNESS
[de-identified] : Primary complaint of right leg pain. Begin right upper buttocks and travels down the leg.  Trouble walking more than a block.  Legs cramp. Wearing compressions stockings.  \par \par Stomach pain as well - intermittent - not related to meals.   no diarrhea.  no constipation.  \par Recent weight loss.  over 14 lbs.    over 6 months [FreeTextEntry1] : leg cramps

## 2019-06-25 NOTE — REVIEW OF SYSTEMS
[Fever] : no fever [Chills] : no chills [Fatigue] : fatigue [Recent Change In Weight] : ~T recent weight change [Night Sweats] : no night sweats [Joint Pain] : joint pain [Back Pain] : back pain [Negative] : Respiratory

## 2019-06-25 NOTE — PHYSICAL EXAM
[No Acute Distress] : no acute distress [Well Nourished] : well nourished [Supple] : supple [No Lymphadenopathy] : no lymphadenopathy [Soft] : abdomen soft [Non-distended] : non-distended [No Masses] : no abdominal mass palpated [Normal Bowel Sounds] : normal bowel sounds [No HSM] : no HSM [Normal Affect] : the affect was normal [Normal Insight/Judgement] : insight and judgment were intact [de-identified] : trace edema b/l le [de-identified] : mild diffuse td [de-identified] : dec rom at hip and knees  [de-identified] : lumar td present [de-identified] : slow halting gait

## 2019-07-02 ENCOUNTER — RX RENEWAL (OUTPATIENT)
Age: 84
End: 2019-07-02

## 2019-08-08 ENCOUNTER — MESSAGE (OUTPATIENT)
Age: 84
End: 2019-08-08

## 2019-09-05 ENCOUNTER — APPOINTMENT (OUTPATIENT)
Dept: PHYSICAL MEDICINE AND REHAB | Facility: CLINIC | Age: 84
End: 2019-09-05
Payer: MEDICARE

## 2019-09-05 VITALS
OXYGEN SATURATION: 99 % | BODY MASS INDEX: 21.26 KG/M2 | HEART RATE: 80 BPM | WEIGHT: 120 LBS | HEIGHT: 63 IN | RESPIRATION RATE: 16 BRPM

## 2019-09-05 DIAGNOSIS — M70.61 TROCHANTERIC BURSITIS, RIGHT HIP: ICD-10-CM

## 2019-09-05 DIAGNOSIS — Z86.79 PERSONAL HISTORY OF OTHER DISEASES OF THE CIRCULATORY SYSTEM: ICD-10-CM

## 2019-09-05 DIAGNOSIS — M17.11 UNILATERAL PRIMARY OSTEOARTHRITIS, RIGHT KNEE: ICD-10-CM

## 2019-09-05 DIAGNOSIS — Z86.39 PERSONAL HISTORY OF OTHER ENDOCRINE, NUTRITIONAL AND METABOLIC DISEASE: ICD-10-CM

## 2019-09-05 DIAGNOSIS — Z87.39 PERSONAL HISTORY OF OTHER DISEASES OF THE MUSCULOSKELETAL SYSTEM AND CONNECTIVE TISSUE: ICD-10-CM

## 2019-09-05 PROCEDURE — 99204 OFFICE O/P NEW MOD 45 MIN: CPT

## 2019-10-23 ENCOUNTER — APPOINTMENT (OUTPATIENT)
Dept: HEART AND VASCULAR | Facility: CLINIC | Age: 84
End: 2019-10-23
Payer: MEDICARE

## 2019-10-23 VITALS
SYSTOLIC BLOOD PRESSURE: 140 MMHG | BODY MASS INDEX: 21.97 KG/M2 | HEIGHT: 63 IN | HEART RATE: 77 BPM | WEIGHT: 124 LBS | DIASTOLIC BLOOD PRESSURE: 77 MMHG

## 2019-10-23 PROCEDURE — 99214 OFFICE O/P EST MOD 30 MIN: CPT | Mod: 25

## 2019-10-23 PROCEDURE — 93291 INTERROG DEV EVAL SCRMS IP: CPT

## 2019-10-23 NOTE — REVIEW OF SYSTEMS
[see HPI] : see HPI [Negative] : Psychiatric [Fever] : no fever [Feeling Fatigued] : not feeling fatigued [Chills] : no chills [Dizziness] : no dizziness [Convulsions] : no convulsions [Tremor] : no tremor was seen

## 2019-10-23 NOTE — REASON FOR VISIT
[Follow-up Device Check] : follow-up device check visit [Follow-Up - Clinic] : a clinic follow-up of [Syncope] : syncope

## 2019-10-23 NOTE — CARDIOLOGY SUMMARY
[___] : [unfilled] [No Ischemia] : no Ischemia [LVEF ___%] : LVEF [unfilled]% [None] : no pulmonary hypertension [Mild] : mild mitral regurgitation [Normal] : normal LA size

## 2019-10-23 NOTE — HISTORY OF PRESENT ILLNESS
[Palpitations] : no palpitations [Syncope] : no syncope [SOB] : no dyspnea [Dizziness] : no dizziness [Chest Pain] : no chest pain or discomfort [Pain at Site] : no pain at device site [Erythema at Site] : no erythema at device site [Swelling at Site] : no swelling at device site [FreeTextEntry1] : Ms. Ulloa is a 85 year old female with syncope and PVCs s/p EP study 4/11/18 EP study with ablation of PVC and ILR implant, who presents for follow up.\par \par She initially presented with complaints of four episodes of syncope.  She initially thought she was just falling because of a bad knee; but when it kept happening she went to see her doctor.  She denies any prodrome of lightheadedness and states she was in her usual health before each episode.  On one episode she hit her face resulting in significant ecchymosis and on another she injured her shoulder.   She ultimately underwent an EP study with normal conduction, no sustained ventricular arrhythmias and significant PVCs s/p ablation (suppression, not elimination).  She also had an loop recorder placed.\par \par She presents for follow up and states she keeps falling.  Two occasions she "thinks she may have lost consciousness for a few seconds" but is unsure.  She saw a neurologist and states the work up was unremarkable.  She denies any near syncope, palpitations, chest pain, SOB, orthopnea or peripheral edema.  No device related complaints.

## 2019-10-23 NOTE — DISCUSSION/SUMMARY
[FreeTextEntry1] : Ms. Ulloa is a 85 year old female with syncope and PVCs s/p EP study 4/11/18 EP study with ablation of PVC and ILR implant, who presents for follow up.  She continues to have falls that are questionably sycnope.  Most of them sound vagal in nature but a couple of them she notes immediate loss of conciousness.  She has a history of vasovagal syncope.  No events on ILR that correspond with syncope.  I have asked her to undergo a tilt table test to confirm the diagnosis of neurocardiogenic syncope.  Given the frequency of these events she may benefit from a pacemaker with CLS to try and prevent this.  We discussed what a tilt table is and the procedure in detail.  She was given instructions.  I have asked her to stop Mexiletine.  She knows to call with any questions or concerns in the interm.

## 2019-10-23 NOTE — PROCEDURE
[de-identified] : Medtronic REVEAL LINQ\par [id number]\par 3/2/18\par good battery\par good sensing\par 1/2019 with likely NSVT

## 2019-10-23 NOTE — PHYSICAL EXAM
[General Appearance - Well Developed] : well developed [Normal Appearance] : normal appearance [Well Groomed] : well groomed [General Appearance - Well Nourished] : well nourished [No Deformities] : no deformities [General Appearance - In No Acute Distress] : no acute distress [Heart Rate And Rhythm] : heart rate and rhythm were normal [Heart Sounds] : normal S1 and S2 [Edema] : no peripheral edema present [] : no respiratory distress [Respiration, Rhythm And Depth] : normal respiratory rhythm and effort [Exaggerated Use Of Accessory Muscles For Inspiration] : no accessory muscle use [Clean] : clean [Dry] : dry [Well-Healed] : well-healed [Normal Conjunctiva] : the conjunctiva exhibited no abnormalities [Normal Oropharynx] : normal oropharynx [Abnormal Walk] : normal gait [Normal Jugular Venous V Waves Present] : normal jugular venous V waves present [Gait - Sufficient For Exercise Testing] : the gait was sufficient for exercise testing [Oriented To Time, Place, And Person] : oriented to person, place, and time [Skin Color & Pigmentation] : normal skin color and pigmentation [Affect] : the affect was normal [Mood] : the mood was normal [No Anxiety] : not feeling anxious [Normal Oral Mucosa] : normal oral mucosa [Palpable Crepitus] : no palpable crepitus [Bleeding] : no active bleeding [Serosanguineous Drainage] : no serosanquineous drainage [Purulent Drainage] : no purulent drainage [Foul Odor] : no foul smell [Serous Drainage] : no serous drainage [Warm] : not warm [Erythema] : not erythematous [Indurated] : not indurated [Tender] : not tender [Fluctuant] : not fluctuant [FreeTextEntry1] : mid L chest

## 2019-10-28 ENCOUNTER — RX RENEWAL (OUTPATIENT)
Age: 84
End: 2019-10-28

## 2019-10-30 ENCOUNTER — OUTPATIENT (OUTPATIENT)
Dept: OUTPATIENT SERVICES | Facility: HOSPITAL | Age: 84
LOS: 1 days | Discharge: HOME | End: 2019-10-30
Payer: MEDICARE

## 2019-10-30 PROCEDURE — 93660 TILT TABLE EVALUATION: CPT | Mod: 26

## 2019-10-30 PROCEDURE — 93660 TILT TABLE EVALUATION: CPT

## 2019-11-08 DIAGNOSIS — R55 SYNCOPE AND COLLAPSE: ICD-10-CM

## 2019-11-27 ENCOUNTER — APPOINTMENT (OUTPATIENT)
Dept: HEART AND VASCULAR | Facility: CLINIC | Age: 84
End: 2019-11-27
Payer: MEDICARE

## 2019-11-27 VITALS
HEART RATE: 82 BPM | SYSTOLIC BLOOD PRESSURE: 141 MMHG | HEIGHT: 63 IN | BODY MASS INDEX: 22.15 KG/M2 | DIASTOLIC BLOOD PRESSURE: 64 MMHG | WEIGHT: 125 LBS

## 2019-11-27 PROCEDURE — 93291 INTERROG DEV EVAL SCRMS IP: CPT

## 2019-11-27 PROCEDURE — 99215 OFFICE O/P EST HI 40 MIN: CPT | Mod: 25

## 2019-11-27 RX ORDER — MEXILETINE HYDROCHLORIDE 150 MG/1
150 CAPSULE ORAL
Qty: 60 | Refills: 1 | Status: DISCONTINUED | COMMUNITY
Start: 2018-04-11 | End: 2019-11-27

## 2019-11-27 NOTE — CARDIOLOGY SUMMARY
[___] : [unfilled] [No Ischemia] : no Ischemia [LVEF ___%] : LVEF [unfilled]% [None] : no pulmonary hypertension [Normal] : normal LA size [Mild] : mild mitral regurgitation

## 2019-12-02 NOTE — PROCEDURE
[de-identified] : Medtronic REVEAL LINQ\par [id number]\par 3/2/18\par good battery\par good sensing\par pauses undersensing\par one episode of afib lasting 4 hours that she was asymptomatic from

## 2019-12-02 NOTE — HISTORY OF PRESENT ILLNESS
[SOB] : no dyspnea [Palpitations] : no palpitations [Syncope] : no syncope [Dizziness] : no dizziness [Chest Pain] : no chest pain or discomfort [Pain at Site] : no pain at device site [Erythema at Site] : no erythema at device site [Swelling at Site] : no swelling at device site [FreeTextEntry1] : Ms. Ulloa is a 85 year old female with syncope and PVCs s/p EP study 4/11/18 EP study with ablation of PVC and ILR implant, who presents for follow up.\par \par She initially presented with complaints of four episodes of syncope.  She initially thought she was just falling because of a bad knee; but when it kept happening she went to see her doctor.  She denies any prodrome of lightheadedness and states she was in her usual health before each episode.  On one episode she hit her face resulting in significant ecchymosis and on another she injured her shoulder.   She ultimately underwent an EP study with normal conduction, no sustained ventricular arrhythmias and significant PVCs s/p ablation (suppression, not elimination).  She also had an loop recorder placed.\par \par She was recently seen and continued to have falling episodes where she "thinks" she may have lost consciousness.   She saw a neurologist and states the work up was unremarkable.  She denies any   palpitations, chest pain, SOB, orthopnea or peripheral edema.  No device related complaints.   She underwent a tilt table test with vaso depressive vasovagal syncope. \par

## 2019-12-02 NOTE — DISCUSSION/SUMMARY
[FreeTextEntry1] : Ms. Ulloa is a 85 year old female with syncope and PVCs s/p EP study 4/11/18 EP study with ablation of PVC and ILR implant, who presents for follow up.  She continues to have falls that are questionably syncope.  Most of them sound vagal in nature but a couple of them she notes immediate loss of consciousness.  She was newly found to have asymptomatic atrial fibrillation.  We discussed the normal conduction system of the heart and what atrial fibrillation is.  We discussed the natural progression of this arrhythmia and the association with stroke.  She will likely need a beta blocker given these rapid heart rates and recurrent syncope I have suggested we proceed with a pacemaker.  We discussed the procedure in detail including risks, benefits and alternatives.  We will put in a device with rate drop response to try and address the drop in her blood pressure.  She is in agreement with this.  We will initiate oral anticoagulation after implant.  She knows to call with any questions or concerns in the interm.

## 2019-12-02 NOTE — REVIEW OF SYSTEMS
[see HPI] : see HPI [Negative] : Heme/Lymph [Fever] : no fever [Feeling Fatigued] : not feeling fatigued [Chills] : no chills [Tremor] : no tremor was seen [Dizziness] : no dizziness [Convulsions] : no convulsions

## 2019-12-02 NOTE — PHYSICAL EXAM
[General Appearance - Well Developed] : well developed [Normal Appearance] : normal appearance [Well Groomed] : well groomed [General Appearance - Well Nourished] : well nourished [No Deformities] : no deformities [General Appearance - In No Acute Distress] : no acute distress [Heart Rate And Rhythm] : heart rate and rhythm were normal [Heart Sounds] : normal S1 and S2 [Edema] : no peripheral edema present [] : no respiratory distress [Respiration, Rhythm And Depth] : normal respiratory rhythm and effort [Exaggerated Use Of Accessory Muscles For Inspiration] : no accessory muscle use [Clean] : clean [Dry] : dry [Well-Healed] : well-healed [Normal Conjunctiva] : the conjunctiva exhibited no abnormalities [Normal Oral Mucosa] : normal oral mucosa [Normal Oropharynx] : normal oropharynx [Normal Jugular Venous V Waves Present] : normal jugular venous V waves present [Abnormal Walk] : normal gait [Skin Color & Pigmentation] : normal skin color and pigmentation [Oriented To Time, Place, And Person] : oriented to person, place, and time [Affect] : the affect was normal [Mood] : the mood was normal [No Anxiety] : not feeling anxious [Foul Odor] : no foul smell [Palpable Crepitus] : no palpable crepitus [Bleeding] : no active bleeding [Serosanguineous Drainage] : no serosanquineous drainage [Purulent Drainage] : no purulent drainage [Serous Drainage] : no serous drainage [Erythema] : not erythematous [Warm] : not warm [Tender] : not tender [Indurated] : not indurated [FreeTextEntry1] : mid L chest [Fluctuant] : not fluctuant

## 2019-12-17 ENCOUNTER — FORM ENCOUNTER (OUTPATIENT)
Age: 84
End: 2019-12-17

## 2019-12-17 ENCOUNTER — OUTPATIENT (OUTPATIENT)
Dept: OUTPATIENT SERVICES | Facility: HOSPITAL | Age: 84
LOS: 1 days | Discharge: HOME | End: 2019-12-17
Payer: MEDICARE

## 2019-12-17 VITALS — HEIGHT: 63 IN | WEIGHT: 119.93 LBS

## 2019-12-17 DIAGNOSIS — R55 SYNCOPE AND COLLAPSE: ICD-10-CM

## 2019-12-17 DIAGNOSIS — Z98.890 OTHER SPECIFIED POSTPROCEDURAL STATES: Chronic | ICD-10-CM

## 2019-12-17 DIAGNOSIS — H40.9 UNSPECIFIED GLAUCOMA: ICD-10-CM

## 2019-12-17 DIAGNOSIS — Z90.49 ACQUIRED ABSENCE OF OTHER SPECIFIED PARTS OF DIGESTIVE TRACT: Chronic | ICD-10-CM

## 2019-12-17 PROCEDURE — 33208 INSRT HEART PM ATRIAL & VENT: CPT | Mod: KX

## 2019-12-17 RX ORDER — CEFAZOLIN SODIUM 1 G
2000 VIAL (EA) INJECTION EVERY 8 HOURS
Refills: 0 | Status: COMPLETED | OUTPATIENT
Start: 2019-12-17 | End: 2019-12-18

## 2019-12-17 RX ORDER — METOPROLOL TARTRATE 50 MG
50 TABLET ORAL DAILY
Refills: 0 | Status: DISCONTINUED | OUTPATIENT
Start: 2019-12-17 | End: 2019-12-18

## 2019-12-17 RX ORDER — CEFAZOLIN SODIUM 1 G
2000 VIAL (EA) INJECTION ONCE
Refills: 0 | Status: DISCONTINUED | OUTPATIENT
Start: 2019-12-17 | End: 2019-12-17

## 2019-12-17 RX ORDER — ACETAMINOPHEN 500 MG
650 TABLET ORAL ONCE
Refills: 0 | Status: COMPLETED | OUTPATIENT
Start: 2019-12-17 | End: 2019-12-17

## 2019-12-17 RX ORDER — CEFAZOLIN SODIUM 1 G
2000 VIAL (EA) INJECTION EVERY 8 HOURS
Refills: 0 | Status: DISCONTINUED | OUTPATIENT
Start: 2019-12-17 | End: 2019-12-17

## 2019-12-17 RX ORDER — CEFAZOLIN SODIUM 1 G
1000 VIAL (EA) INJECTION EVERY 8 HOURS
Refills: 0 | Status: DISCONTINUED | OUTPATIENT
Start: 2019-12-17 | End: 2019-12-17

## 2019-12-17 RX ORDER — CEFAZOLIN SODIUM 1 G
VIAL (EA) INJECTION
Refills: 0 | Status: DISCONTINUED | OUTPATIENT
Start: 2019-12-17 | End: 2019-12-17

## 2019-12-17 RX ORDER — CEFAZOLIN SODIUM 1 G
1000 VIAL (EA) INJECTION ONCE
Refills: 0 | Status: DISCONTINUED | OUTPATIENT
Start: 2019-12-17 | End: 2019-12-17

## 2019-12-17 RX ORDER — LATANOPROST 0.05 MG/ML
1 SOLUTION/ DROPS OPHTHALMIC; TOPICAL DAILY
Refills: 0 | Status: DISCONTINUED | OUTPATIENT
Start: 2019-12-17 | End: 2019-12-18

## 2019-12-17 RX ORDER — CEFAZOLIN SODIUM 1 G
2000 VIAL (EA) INJECTION ONCE
Refills: 0 | Status: COMPLETED | OUTPATIENT
Start: 2019-12-17 | End: 2019-12-17

## 2019-12-17 RX ORDER — CEFAZOLIN SODIUM 1 G
VIAL (EA) INJECTION
Refills: 0 | Status: COMPLETED | OUTPATIENT
Start: 2019-12-17 | End: 2019-12-18

## 2019-12-17 RX ORDER — ASPIRIN/CALCIUM CARB/MAGNESIUM 324 MG
81 TABLET ORAL DAILY
Refills: 0 | Status: DISCONTINUED | OUTPATIENT
Start: 2019-12-17 | End: 2019-12-18

## 2019-12-17 RX ADMIN — Medication 2000 MILLIGRAM(S): at 22:12

## 2019-12-17 RX ADMIN — Medication 2000 MILLIGRAM(S): at 08:45

## 2019-12-17 RX ADMIN — Medication 650 MILLIGRAM(S): at 17:27

## 2019-12-17 RX ADMIN — Medication 650 MILLIGRAM(S): at 18:27

## 2019-12-17 NOTE — PROGRESS NOTE ADULT - SUBJECTIVE AND OBJECTIVE BOX
BRANDEN DOMINGUEZ  [id number]    PROCEDURE:  Dual chamber PPM placement with His lead.          INDICATION:  SSS        ELECTROPHYSIOLOGIST(S):  MD Buddy Horvath, MD Panda MD        FINDINGS:  - Successful dual chamber PPM placement with His lead      COMPLICATIONS:  None      RECOMMENDATIONS:  - Continue Ancef 2g q8 hrs x2 doses  - CXR AP/Lat in the AM

## 2019-12-17 NOTE — H&P ADULT - ASSESSMENT
85 year old female with history of syncope and PVCs,  s/p EP study 4/11/18 EP study with ablation of PVC and ILR implant, who presents for pacemaker implant and ILR explant.

## 2019-12-17 NOTE — H&P ADULT - NSICDXPASTMEDICALHX_GEN_ALL_CORE_FT
PAST MEDICAL HISTORY:  Cerebral aneurysm     Glaucoma     Osteoarthritis     PVC (premature ventricular contraction)     Syncope and collapse

## 2019-12-17 NOTE — H&P ADULT - PROBLEM/PLAN-1
LAB INFORMATION      FASTING LABS ARE ORDERED FOR YOU.    PLEASE PRESENT TO THE LAB ON THE FIRST FLOOR OF THE CLINIC  HOURS (M-F 7AM -6P; SAT 730AM-NOON)    PLEASE FAST FOR 12 HOURS    NO APPOINTMENT IS NECESSARY     Screening labs may not be covered by your insurance policy.  You are encouraged to check your benefits coverage prior to having any lab work completed.    
DISPLAY PLAN FREE TEXT

## 2019-12-17 NOTE — H&P ADULT - NSICDXPASTSURGICALHX_GEN_ALL_CORE_FT
PAST SURGICAL HISTORY:  History of loop recorder PAST SURGICAL HISTORY:  History of cholecystectomy     History of loop recorder

## 2019-12-17 NOTE — H&P ADULT - HISTORY OF PRESENT ILLNESS
85 year old female with history of syncope and PVCs,  s/p EP study 4/11/18 EP study with ablation of PVC and ILR implant, who presents for pacemaker implant and ILR explant.     She initially presented with complaints of four episodes of syncope.  She initially thought she was just falling because of a bad knee; but when it kept happening she went to see her doctor.  She denies any prodrome of lightheadedness and states she was in her usual health before each episode.  On one episode she hit her face resulting in significant ecchymosis and on another she injured her shoulder.   She ultimately underwent an EP study with normal conduction, no sustained ventricular arrhythmias and significant PVCs s/p ablation (suppression, not elimination).  She also had an loop recorder placed.  She was recently seen and continued to have falling episodes where she "thinks" she may have lost consciousness.   She saw a neurologist and states the work up was unremarkable.  She denies any   palpitations, chest pain, SOB, orthopnea or peripheral edema.  No device related complaints.   She underwent a tilt table test with vaso depressive vasovagal syncope.

## 2019-12-17 NOTE — PATIENT PROFILE ADULT - ANY SIGNIFICANT CHANGE IN ABILITY TO PERFORM THE FOLLOWING ADL SINCE THE ONSET OF PRESENT ILLNESS?
Prescription for Acyclovir ointment faxed to Karyn Thompson MA on 11/7/2018 at 11:09 AM    
Prior to injection verified patient identity using patient's name and date of birth.  Due to injection administration, patient instructed to remain in clinic for 15 minutes  afterwards, and to report any adverse reaction to me immediately. Vaccine information supplied.      Injectable Influenza Immunization Documentation    1.  Is the person to be vaccinated sick today?   No    2. Does the person to be vaccinated have an allergy to a component   of the vaccine?   No  Egg Allergy Algorithm Link    3. Has the person to be vaccinated ever had a serious reaction   to influenza vaccine in the past?   No    4. Has the person to be vaccinated ever had Guillain-Barré syndrome?   No      Form completed by Donato Marquez      
no

## 2019-12-18 ENCOUNTER — TRANSCRIPTION ENCOUNTER (OUTPATIENT)
Age: 84
End: 2019-12-18

## 2019-12-18 VITALS — TEMPERATURE: 98 F

## 2019-12-18 PROCEDURE — C1894: CPT

## 2019-12-18 PROCEDURE — C1898: CPT

## 2019-12-18 PROCEDURE — 71046 X-RAY EXAM CHEST 2 VIEWS: CPT

## 2019-12-18 PROCEDURE — 71046 X-RAY EXAM CHEST 2 VIEWS: CPT | Mod: 26

## 2019-12-18 PROCEDURE — 33208 INSRT HEART PM ATRIAL & VENT: CPT

## 2019-12-18 PROCEDURE — C1887: CPT

## 2019-12-18 PROCEDURE — C1769: CPT

## 2019-12-18 PROCEDURE — C1785: CPT

## 2019-12-18 RX ORDER — ACETAMINOPHEN 500 MG
650 TABLET ORAL ONCE
Refills: 0 | Status: COMPLETED | OUTPATIENT
Start: 2019-12-18 | End: 2019-12-18

## 2019-12-18 RX ADMIN — Medication 2000 MILLIGRAM(S): at 05:20

## 2019-12-18 RX ADMIN — Medication 81 MILLIGRAM(S): at 11:03

## 2019-12-18 RX ADMIN — Medication 650 MILLIGRAM(S): at 05:20

## 2019-12-18 RX ADMIN — Medication 650 MILLIGRAM(S): at 06:53

## 2019-12-18 RX ADMIN — Medication 50 MILLIGRAM(S): at 05:21

## 2019-12-18 NOTE — DISCHARGE NOTE NURSING/CASE MANAGEMENT/SOCIAL WORK - PATIENT PORTAL LINK FT
You can access the FollowMyHealth Patient Portal offered by [facility] by registering at the following website: [email or web address]. By joining [employer]’s FollowMyHealth portal, you will also be able to view your health information using other applications (apps) compatible with our system.

## 2019-12-18 NOTE — DISCHARGE NOTE PROVIDER - HOSPITAL COURSE
85 year old female with history of syncope and PVCs,  s/p EP study 4/11/18 EP study with ablation of PVC and ILR implant, who presents for pacemaker implant and ILR explant.         She initially presented with complaints of four episodes of syncope.  She initially thought she was just falling because of a bad knee; but when it kept happening she went to see her doctor.  She denies any prodrome of lightheadedness and states she was in her usual health before each episode.  On one episode she hit her face resulting in significant ecchymosis and on another she injured her shoulder.   She ultimately underwent an EP study with normal conduction, no sustained ventricular arrhythmias and significant PVCs s/p ablation (suppression, not elimination).  She also had an loop recorder placed.    She was recently seen and continued to have falling episodes where she "thinks" she may have lost consciousness.   She saw a neurologist and states the work up was unremarkable.  She denies any   palpitations, chest pain, SOB, orthopnea or peripheral edema.  No device related complaints.   She underwent a tilt table test with vaso depressive vasovagal syncope. 85 year old female with history of syncope and PVCs,  s/p EP study 4/11/18 EP study with ablation of PVC and ILR implant, who presents for pacemaker implant and ILR explant.         She initially presented with complaints of four episodes of syncope.  She initially thought she was just falling because of a bad knee; but when it kept happening she went to see her doctor.  She denies any prodrome of lightheadedness and states she was in her usual health before each episode.  On one episode she hit her face resulting in significant ecchymosis and on another she injured her shoulder.   She ultimately underwent an EP study with normal conduction, no sustained ventricular arrhythmias and significant PVCs s/p ablation (suppression, not elimination).  She also had an loop recorder placed.    She was recently seen and continued to have falling episodes where she "thinks" she may have lost consciousness.   She saw a neurologist and states the work up was unremarkable.  She denies any   palpitations, chest pain, SOB, orthopnea or peripheral edema.  No device related complaints.   She underwent a tilt table test with vaso depressive vasovagal syncope.       Patient had successful implantation of dual chamber pacemaker with HIS lead position MDT. there were no complications.    Post implant device check normal function, site of implant no bleeding, no swelling, no hematoma.     CXR done.     Patient was stable for discharge.

## 2019-12-18 NOTE — DISCHARGE NOTE PROVIDER - CARE PROVIDER_API CALL
Buddy Horvath)  Cardiac Electrophysiology; Cardiology; Cardiovascular Disease  [address], [address] [place] [address]  Phone: [phone]  Fax: [phone]  Established Patient  Scheduled Appointment: 01/22/2020 11:00 AM

## 2019-12-18 NOTE — DISCHARGE NOTE PROVIDER - NSDCCPTREATMENT_GEN_ALL_CORE_FT
PRINCIPAL PROCEDURE  Procedure: Insertion of dual lead pacemaker  Findings and Treatment:       SECONDARY PROCEDURE  Procedure: Removal, implantable loop recorder  Findings and Treatment:

## 2019-12-18 NOTE — DISCHARGE NOTE PROVIDER - PROVIDER TOKENS
PROVIDER:[TOKEN:[[id number]],SCHEDULEDAPPT:[01/22/2020],SCHEDULEDAPPTTIME:[11:00 AM],ESTABLISHEDPATIENT:[T]]

## 2019-12-18 NOTE — DISCHARGE NOTE PROVIDER - NSDCCPCAREPLAN_GEN_ALL_CORE_FT
PRINCIPAL DISCHARGE DIAGNOSIS  Diagnosis: SSS (sick sinus syndrome)  Assessment and Plan of Treatment:

## 2019-12-18 NOTE — DISCHARGE NOTE PROVIDER - NSDCMRMEDTOKEN_GEN_ALL_CORE_FT
Aspirin Enteric Coated 81 mg oral delayed release tablet: 1 tab(s) orally once a day  latanoprost 0.005% ophthalmic solution: 1 drop(s) to each affected eye once a day (in the evening)  metoprolol succinate 50 mg oral tablet, extended release: 1 tab(s) orally once a day  ** dose increased

## 2019-12-18 NOTE — PROGRESS NOTE ADULT - SUBJECTIVE AND OBJECTIVE BOX
EPS Device interrogation    Indication: post implant    Device model: ALLYSON Le 				Implanting Physician:     Functioning Mode: DDD 50/130			    Underlying Rhythm: AS/    Pacemaker dependency:  No    Battery status: 100% (VIVIEN)  Interrogating parameters:   				RA			RV			LV    Sense:                                        1.6   mV                        4.3   mV    Threshold:                                    0.5 V @ 0.4 ms            0.75 V @ 0.4 ms (HIS)     Pacing Impedance:                           437 om                           475  om    Shock Impedance:                                              n/a           Events/Alert:  none    Parameter change: 	none    Normal function PPM  site of implant dry/clean, no swelling, no hematoma, no bleeding  [ ]EPS attending: Interrogation reviewed. Agree with above.

## 2020-01-22 ENCOUNTER — APPOINTMENT (OUTPATIENT)
Dept: HEART AND VASCULAR | Facility: CLINIC | Age: 85
End: 2020-01-22
Payer: MEDICARE

## 2020-01-22 VITALS
HEART RATE: 88 BPM | BODY MASS INDEX: 22.15 KG/M2 | DIASTOLIC BLOOD PRESSURE: 59 MMHG | SYSTOLIC BLOOD PRESSURE: 122 MMHG | HEIGHT: 63 IN | WEIGHT: 125 LBS

## 2020-01-22 PROCEDURE — 93280 PM DEVICE PROGR EVAL DUAL: CPT

## 2020-01-22 NOTE — CARDIOLOGY SUMMARY
[No Ischemia] : no Ischemia [___] : [unfilled] [LVEF ___%] : LVEF [unfilled]% [None] : no pulmonary hypertension [Mild] : mild mitral regurgitation [Normal] : normal LA size

## 2020-01-27 NOTE — DISCUSSION/SUMMARY
[FreeTextEntry1] : Ms. Ulloa is a 85 year old female with syncope and PVCs s/p EP study 4/11/18 EP study with ablation of PVC and paroxysmal atrial fibrillation s/p PPM, who presents for follow up.  Device incision is well healed.  Interrogation reveals normal function and all measured data is within normal limits.  No significant events for review.  She is maintained on eliquis.  No changes made today.  She will follow up in 6 months or sooner if needed.  She knows to call with any questions or concerns in the interm.

## 2020-01-27 NOTE — REVIEW OF SYSTEMS
[see HPI] : see HPI [Negative] : Integumentary [Fever] : no fever [Chills] : no chills [Feeling Fatigued] : not feeling fatigued [Dizziness] : no dizziness

## 2020-01-27 NOTE — PHYSICAL EXAM
[General Appearance - Well Developed] : well developed [Normal Appearance] : normal appearance [General Appearance - Well Nourished] : well nourished [Well Groomed] : well groomed [No Deformities] : no deformities [Heart Rate And Rhythm] : heart rate and rhythm were normal [General Appearance - In No Acute Distress] : no acute distress [] : no respiratory distress [Heart Sounds] : normal S1 and S2 [Edema] : no peripheral edema present [Respiration, Rhythm And Depth] : normal respiratory rhythm and effort [Exaggerated Use Of Accessory Muscles For Inspiration] : no accessory muscle use [Dry] : dry [Clean] : clean [Well-Healed] : well-healed [Normal Oral Mucosa] : normal oral mucosa [Normal Conjunctiva] : the conjunctiva exhibited no abnormalities [Normal Jugular Venous V Waves Present] : normal jugular venous V waves present [Skin Color & Pigmentation] : normal skin color and pigmentation [Abnormal Walk] : normal gait [Affect] : the affect was normal [Mood] : the mood was normal [No Anxiety] : not feeling anxious [Oriented To Time, Place, And Person] : oriented to person, place, and time [Palpable Crepitus] : no palpable crepitus [Bleeding] : no active bleeding [Foul Odor] : no foul smell [Purulent Drainage] : no purulent drainage [Serosanguineous Drainage] : no serosanquineous drainage [Serous Drainage] : no serous drainage [Erythema] : not erythematous [Warm] : not warm [Tender] : not tender [Indurated] : not indurated [Fluctuant] : not fluctuant [FreeTextEntry1] : mid L chest

## 2020-01-27 NOTE — PROCEDURE
[No] : not [NSR] : normal sinus rhythm [Pacemaker] : pacemaker [DDD] : DDD [Threshold Testing Performed] : Threshold testing was performed [Lead Imp:  ___ohms] : lead impedance was [unfilled] ohms [Sensing Amplitude ___mv] : sensing amplitude was [unfilled] mv [___V @] : [unfilled] V [___ ms] : [unfilled] ms [Outputs/Safety Margin] : output changed to allow for adequate safety margin [de-identified] : Medtronic [de-identified] : Mimi [de-identified] : [id number] [de-identified] : 12/17/19 [de-identified] : 50/130 [de-identified] : 11.1 years [de-identified] :  AP 1.9%\par  90.6%

## 2020-01-27 NOTE — REASON FOR VISIT
[Follow-up Device Check] : follow-up device check visit [Follow-Up - Clinic] : a clinic follow-up of [Syncope] : syncope [Family Member] : family member

## 2020-01-27 NOTE — HISTORY OF PRESENT ILLNESS
[Palpitations] : no palpitations [SOB] : no dyspnea [Syncope] : no syncope [Dizziness] : no dizziness [Chest Pain] : no chest pain or discomfort [Pain at Site] : no pain at device site [Erythema at Site] : no erythema at device site [Swelling at Site] : no swelling at device site [FreeTextEntry1] : Ms. Ulloa is a 85 year old female with syncope and PVCs s/p EP study 4/11/18 EP study with ablation of PVC and paroxysmal atrial fibrillation s/p PPM, who presents for follow up.\par \par She initially presented with complaints of four episodes of syncope.  She initially thought she was just falling because of a bad knee; but when it kept happening she went to see her doctor.  She denies any prodrome of lightheadedness and states she was in her usual health before each episode.  On one episode she hit her face resulting in significant ecchymosis and on another she injured her shoulder.   She ultimately underwent an EP study with normal conduction, no sustained ventricular arrhythmias and significant PVCs s/p ablation (suppression, not elimination).  She also had an loop recorder placed.\par \par She was recently seen and continued to have falling episodes where she "thinks" she may have lost consciousness.   She saw a neurologist and states the work up was unremarkable.  She denies any   palpitations, chest pain, SOB, orthopnea or peripheral edema.  No device related complaints.   She underwent a tilt table test with vaso depressive vasovagal syncope. Last visit she was found to have paroxysmal atrial fibrillation that she was asymptomatic from and was started on anticoagulation.  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  She presents for follow up with no further falling/fainting episodes.  Overall she feels well.  No device related complaints.  No chest pain, palpitations or SOB\par

## 2020-03-30 ENCOUNTER — RX RENEWAL (OUTPATIENT)
Age: 85
End: 2020-03-30

## 2020-07-20 ENCOUNTER — APPOINTMENT (OUTPATIENT)
Dept: INTERNAL MEDICINE | Facility: CLINIC | Age: 85
End: 2020-07-20
Payer: MEDICARE

## 2020-07-20 VITALS
SYSTOLIC BLOOD PRESSURE: 144 MMHG | WEIGHT: 125 LBS | TEMPERATURE: 96.8 F | RESPIRATION RATE: 16 BRPM | BODY MASS INDEX: 22.15 KG/M2 | HEIGHT: 63 IN | HEART RATE: 72 BPM | DIASTOLIC BLOOD PRESSURE: 78 MMHG | OXYGEN SATURATION: 98 %

## 2020-07-20 DIAGNOSIS — Z00.00 ENCOUNTER FOR GENERAL ADULT MEDICAL EXAMINATION W/OUT ABNORMAL FINDINGS: ICD-10-CM

## 2020-07-20 PROCEDURE — 99214 OFFICE O/P EST MOD 30 MIN: CPT

## 2020-07-20 PROCEDURE — 36415 COLL VENOUS BLD VENIPUNCTURE: CPT

## 2020-07-20 NOTE — PLAN
[FreeTextEntry1] : PVC - continue metoprolol\par \par Edema- compression socks\par \par Back pain and scistic pain - Lumbar compression fractures\par send for hip xray again though likely related to the compression fracture\par send for PT\par \par Labs  today

## 2020-07-20 NOTE — HISTORY OF PRESENT ILLNESS
[de-identified] : 87 yo f with hx of syncope, cerebral aneurysm \par Has been having pains in her legs at night.  Right leg for a long time - has been evaluated in the past.  Now for the first time it is in her left leg.  RIght leg hurts throughout the day but left leg is at night.  \par Nothing helps with the pain.  \par Does not take any pain medications - tylenol did not help\par Has had falls in the past.

## 2020-07-20 NOTE — PHYSICAL EXAM
[de-identified] : b/l le swleling no pitting edema [Normal] : the outer ears and nose were normal in appearance and the oropharynx was normal [de-identified] : right lateral hip pain  [de-identified] : slow gait

## 2020-07-21 LAB
ALBUMIN SERPL ELPH-MCNC: 4.2 G/DL
ALP BLD-CCNC: 72 U/L
ALT SERPL-CCNC: 9 U/L
ANION GAP SERPL CALC-SCNC: 13 MMOL/L
AST SERPL-CCNC: 18 U/L
BASOPHILS # BLD AUTO: 0.06 K/UL
BASOPHILS NFR BLD AUTO: 0.9 %
BILIRUB SERPL-MCNC: 0.3 MG/DL
BUN SERPL-MCNC: 17 MG/DL
CALCIUM SERPL-MCNC: 9.8 MG/DL
CHLORIDE SERPL-SCNC: 105 MMOL/L
CHOLEST SERPL-MCNC: 215 MG/DL
CHOLEST/HDLC SERPL: 3.8 RATIO
CO2 SERPL-SCNC: 26 MMOL/L
CREAT SERPL-MCNC: 0.9 MG/DL
EOSINOPHIL # BLD AUTO: 0.19 K/UL
EOSINOPHIL NFR BLD AUTO: 2.8 %
ESTIMATED AVERAGE GLUCOSE: 120 MG/DL
GLUCOSE SERPL-MCNC: 97 MG/DL
HBA1C MFR BLD HPLC: 5.8 %
HCT VFR BLD CALC: 42.9 %
HDLC SERPL-MCNC: 57 MG/DL
HGB BLD-MCNC: 12.7 G/DL
IMM GRANULOCYTES NFR BLD AUTO: 0.1 %
LDLC SERPL CALC-MCNC: 133 MG/DL
LYMPHOCYTES # BLD AUTO: 2.07 K/UL
LYMPHOCYTES NFR BLD AUTO: 30.1 %
MAN DIFF?: NORMAL
MCHC RBC-ENTMCNC: 29.3 PG
MCHC RBC-ENTMCNC: 29.6 GM/DL
MCV RBC AUTO: 99.1 FL
MONOCYTES # BLD AUTO: 0.57 K/UL
MONOCYTES NFR BLD AUTO: 8.3 %
NEUTROPHILS # BLD AUTO: 3.98 K/UL
NEUTROPHILS NFR BLD AUTO: 57.8 %
PLATELET # BLD AUTO: 166 K/UL
POTASSIUM SERPL-SCNC: 4 MMOL/L
PROT SERPL-MCNC: 6.8 G/DL
RBC # BLD: 4.33 M/UL
RBC # FLD: 14.8 %
SODIUM SERPL-SCNC: 144 MMOL/L
TRIGL SERPL-MCNC: 123 MG/DL
WBC # FLD AUTO: 6.88 K/UL

## 2020-07-22 ENCOUNTER — OUTPATIENT (OUTPATIENT)
Dept: OUTPATIENT SERVICES | Facility: HOSPITAL | Age: 85
LOS: 1 days | End: 2020-07-22
Payer: MEDICARE

## 2020-07-22 DIAGNOSIS — Z90.49 ACQUIRED ABSENCE OF OTHER SPECIFIED PARTS OF DIGESTIVE TRACT: Chronic | ICD-10-CM

## 2020-07-22 DIAGNOSIS — Z98.890 OTHER SPECIFIED POSTPROCEDURAL STATES: Chronic | ICD-10-CM

## 2020-07-22 PROCEDURE — 73521 X-RAY EXAM HIPS BI 2 VIEWS: CPT | Mod: 26

## 2020-07-22 PROCEDURE — 73521 X-RAY EXAM HIPS BI 2 VIEWS: CPT

## 2020-08-24 ENCOUNTER — APPOINTMENT (OUTPATIENT)
Dept: HEART AND VASCULAR | Facility: CLINIC | Age: 85
End: 2020-08-24
Payer: MEDICARE

## 2020-08-24 VITALS
BODY MASS INDEX: 22.15 KG/M2 | HEIGHT: 63 IN | DIASTOLIC BLOOD PRESSURE: 65 MMHG | HEART RATE: 79 BPM | SYSTOLIC BLOOD PRESSURE: 137 MMHG | WEIGHT: 125 LBS

## 2020-08-24 PROCEDURE — 99214 OFFICE O/P EST MOD 30 MIN: CPT | Mod: 25

## 2020-08-24 PROCEDURE — 93280 PM DEVICE PROGR EVAL DUAL: CPT

## 2020-08-24 NOTE — REASON FOR VISIT
[Follow-up Device Check] : follow-up device check visit [Syncope] : syncope [Follow-Up - Clinic] : a clinic follow-up of [Family Member] : family member

## 2020-08-27 NOTE — PROCEDURE
[No] : not [NSR] : normal sinus rhythm [DDD] : DDD [Pacemaker] : pacemaker [Threshold Testing Performed] : Threshold testing was performed [Lead Imp:  ___ohms] : lead impedance was [unfilled] ohms [Sensing Amplitude ___mv] : sensing amplitude was [unfilled] mv [___ ms] : [unfilled] ms [___V @] : [unfilled] V [None] : none [de-identified] : Mimi [de-identified] : Medtronic [de-identified] : 12/17/19 [de-identified] : [id number] [de-identified] : 50/130 [de-identified] : 12.1 years [de-identified] :  AP 4%\par  36.4%\par no further atrial fibrillation\par 1662 drop rate episodes

## 2020-08-27 NOTE — PHYSICAL EXAM
[General Appearance - Well Developed] : well developed [General Appearance - Well Nourished] : well nourished [Normal Appearance] : normal appearance [Well Groomed] : well groomed [No Deformities] : no deformities [Heart Rate And Rhythm] : heart rate and rhythm were normal [General Appearance - In No Acute Distress] : no acute distress [Heart Sounds] : normal S1 and S2 [Edema] : no peripheral edema present [] : no respiratory distress [Respiration, Rhythm And Depth] : normal respiratory rhythm and effort [Exaggerated Use Of Accessory Muscles For Inspiration] : no accessory muscle use [Clean] : clean [Dry] : dry [Well-Healed] : well-healed [Normal Jugular Venous V Waves Present] : normal jugular venous V waves present [Normal Conjunctiva] : the conjunctiva exhibited no abnormalities [Normal Oral Mucosa] : normal oral mucosa [Abnormal Walk] : normal gait [Skin Color & Pigmentation] : normal skin color and pigmentation [Oriented To Time, Place, And Person] : oriented to person, place, and time [Affect] : the affect was normal [Mood] : the mood was normal [No Anxiety] : not feeling anxious [Impaired Insight] : insight and judgment were intact [Palpable Crepitus] : no palpable crepitus [Bleeding] : no active bleeding [Foul Odor] : no foul smell [Purulent Drainage] : no purulent drainage [Serosanguineous Drainage] : no serosanquineous drainage [Erythema] : not erythematous [Serous Drainage] : no serous drainage [Tender] : not tender [Indurated] : not indurated [Warm] : not warm [Fluctuant] : not fluctuant [FreeTextEntry1] : mid L chest

## 2020-08-27 NOTE — DISCUSSION/SUMMARY
[FreeTextEntry1] : Ms. Ulloa is a 86 year old female with syncope and PVCs s/p EP study 4/11/18 EP study with ablation of PVC and paroxysmal atrial fibrillation s/p PPM, who presents for follow up.  Device interrogation reveals normal function and all measured data is within normal limits.  No significant events for review.  She is off Eliquis (unclear why) but does have a brain aneurysm.  We discussed that while she had no further episodes since our last talk she should ideally be on a NOAC.  I have asked her to re-establish care with her neurologist to discuss if she is safe to start one of these medications and if she is we will restart this.  We are watching her remotely but we discussed the limitations of this and the association between atrial fibrillation and stroke.  She will follow up in 4 months or sooner if needed.  She knows to call with any questions or concerns in the interm.

## 2020-08-27 NOTE — REVIEW OF SYSTEMS
[see HPI] : see HPI [Negative] : Heme/Lymph [Recent Weight Gain (___ Lbs)] : no recent weight gain [Fever] : no fever [Chills] : no chills [Recent Weight Loss (___ Lbs)] : no recent weight loss [Feeling Fatigued] : not feeling fatigued [Dizziness] : no dizziness

## 2020-08-27 NOTE — HISTORY OF PRESENT ILLNESS
[SOB] : no dyspnea [Palpitations] : no palpitations [Dizziness] : no dizziness [Chest Pain] : no chest pain or discomfort [Syncope] : no syncope [Pain at Site] : no pain at device site [Erythema at Site] : no erythema at device site [Swelling at Site] : no swelling at device site [FreeTextEntry1] : Ms. Ulloa is a 86 year old female with syncope and PVCs s/p EP study 4/11/18 EP study with ablation of PVC and paroxysmal atrial fibrillation s/p PPM, who presents for follow up.\par \par She initially presented with complaints of four episodes of syncope.  She thought she was just falling because of a bad knee; but when it kept happening she went to see her doctor.  She denies any prodrome of lightheadedness and states she was in her usual health before each episode.  On one episode she hit her face resulting in significant ecchymosis and on another she injured her shoulder.   She ultimately underwent an EP study with normal conduction, no sustained ventricular arrhythmias and significant PVCs s/p ablation (suppression, not elimination).  She also had an loop recorder placed.\par \par She continued to have falling episodes where she "thinks" she may have lost consciousness.   She saw a neurologist and states the work up was unremarkable.  She underwent a tilt table test with vaso depressive vasovagal syncope. She had an episode of paroxysmal atrial fibrillation that she was asymptomatic from and was started on anticoagulation - which at some point was stopped (she is unclear when).  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  She presents for follow up with no further falling/fainting episodes.  \par \par  SInce her last visit she had a transmission with afib that she was asymptomatic from.  This was discussed at great length with her and her daughter.  Concern for brain aneurysm but the doctors she gave us to contact hadnt seen her in years.  She was recommended to follow up with them and has not as of yet.  No palpitations, chest pain, SOB, syncope or near syncope.  Overall she feels well.  No device related complaints. \par  \par

## 2020-10-12 ENCOUNTER — APPOINTMENT (OUTPATIENT)
Dept: INTERNAL MEDICINE | Facility: CLINIC | Age: 85
End: 2020-10-12
Payer: MEDICARE

## 2020-10-12 VITALS
OXYGEN SATURATION: 97 % | TEMPERATURE: 98.6 F | HEART RATE: 76 BPM | DIASTOLIC BLOOD PRESSURE: 73 MMHG | RESPIRATION RATE: 16 BRPM | SYSTOLIC BLOOD PRESSURE: 122 MMHG

## 2020-10-12 DIAGNOSIS — J34.89 OTHER SPECIFIED DISORDERS OF NOSE AND NASAL SINUSES: ICD-10-CM

## 2020-10-12 DIAGNOSIS — M19.90 UNSPECIFIED OSTEOARTHRITIS, UNSPECIFIED SITE: ICD-10-CM

## 2020-10-12 DIAGNOSIS — M25.579 PAIN IN UNSPECIFIED ANKLE AND JOINTS OF UNSPECIFIED FOOT: ICD-10-CM

## 2020-10-12 PROCEDURE — 99214 OFFICE O/P EST MOD 30 MIN: CPT

## 2020-10-12 NOTE — REVIEW OF SYSTEMS
[Joint Pain] : joint pain [Joint Swelling] : joint swelling [Skin Rash] : skin rash [Negative] : Heme/Lymph [Joint Stiffness] : no joint stiffness [Back Pain] : no back pain [Itching] : no itching

## 2020-10-12 NOTE — HISTORY OF PRESENT ILLNESS
[Rash (Location) ___] : rash on [unfilled] [Mild] : mild [Gradual] : started gradually [Stable] : stable [Musculoskeletal Symptoms Legs] : leg [Musculoskeletal Symptoms Knees] : knee [Constant] : constant [Moderate] : moderate [Rest] : rest [Activity] : with activity [At Night] : at night [Worsening] : worsening [FreeTextEntry7] : left side of nose [FreeTextEntry5] : diclofenac gel  [FreeTextEntry8] : Skin lesions\par -Reports that nodule on left of nose has not been going away. A nodule was removed in the same area by a dermatologist before, but now it has returned. It is not itchy, not painful\par -also a erythematous macule on right ear\par \par Osteoarthritis \par -right knee pain, swelling, worse at night\par -feet and ankle swelling, pain, decreased mobility \par -diclofenac and PT helped. Ran out of diclofenac gel \par -using cane, does not want to use walker. Loves walking \par \par Had flu shot this year at [facility]

## 2020-10-12 NOTE — PHYSICAL EXAM
[Normal] : no CVA or spinal tenderness [de-identified] : decreased ROM on 2nd toes on left and right feet, pain appreciated during flexion of feet, a bone spur on 2nd to of both feet

## 2020-10-12 NOTE — ASSESSMENT
[FreeTextEntry1] : Skin lesions: nontender nodule with crusting and hyperkeratoses, need evaluation for possible Squamous Cell Carcinoma; also erythematous poorly demarcated nontender macule on right ear that needs evaluation by dermatologist.\par \par OA:\par -bone spurs on left and right 2nd toes\par -refill diclofenac topical gel for joint inflammation\par \par Ankle and toe pain:\par -decreased flexion of toes with bone spurs\par -referral to podiatrist

## 2020-10-19 ENCOUNTER — RX RENEWAL (OUTPATIENT)
Age: 85
End: 2020-10-19

## 2021-01-20 ENCOUNTER — RX RENEWAL (OUTPATIENT)
Age: 86
End: 2021-01-20

## 2021-03-03 ENCOUNTER — APPOINTMENT (OUTPATIENT)
Dept: HEART AND VASCULAR | Facility: CLINIC | Age: 86
End: 2021-03-03
Payer: MEDICARE

## 2021-03-03 VITALS
TEMPERATURE: 97.6 F | BODY MASS INDEX: 20.91 KG/M2 | HEART RATE: 88 BPM | SYSTOLIC BLOOD PRESSURE: 140 MMHG | HEIGHT: 63 IN | WEIGHT: 118 LBS | DIASTOLIC BLOOD PRESSURE: 66 MMHG

## 2021-03-03 PROCEDURE — 99072 ADDL SUPL MATRL&STAF TM PHE: CPT

## 2021-03-03 PROCEDURE — 99212 OFFICE O/P EST SF 10 MIN: CPT | Mod: 25

## 2021-03-03 PROCEDURE — 93280 PM DEVICE PROGR EVAL DUAL: CPT

## 2021-03-04 NOTE — DISCUSSION/SUMMARY
[FreeTextEntry1] : Ms. Ulloa is a 86 year old female with syncope and PVCs s/p EP study 4/11/18 EP study with ablation of PVC and paroxysmal atrial fibrillation s/p PPM, who presents for follow up.  Device interrogation reveals normal function and all measured data is within normal limits.  No significant events for review.  I disussed with Ms. Ulloa and her daughter again via phone that she needs to follow up with Neurology to get clearance to start Eliquis.  If she can not be on Eliquis we can discuss a Watchman.  If she can not get in touch with her established neurologist (daughter states she has called) we will refer her to someone at [facility]>  She will follow up in 3 months or sooner if needed.  She knows to call with any questions or concerns in the interm.

## 2021-03-04 NOTE — HISTORY OF PRESENT ILLNESS
[Palpitations] : no palpitations [SOB] : no dyspnea [Syncope] : no syncope [Dizziness] : no dizziness [Chest Pain] : no chest pain or discomfort [Pain at Site] : no pain at device site [Erythema at Site] : no erythema at device site [Swelling at Site] : no swelling at device site [FreeTextEntry1] : Ms. Ulloa is a 86 year old female with syncope and PVCs s/p EP study 4/11/18 EP study with ablation of PVC and paroxysmal atrial fibrillation s/p PPM, who presents for follow up.\par \par She initially presented with complaints of four episodes of syncope.  She thought she was just falling because of a bad knee; but when it kept happening she went to see her doctor.  She denies any prodrome of lightheadedness and states she was in her usual health before each episode.  On one episode she hit her face resulting in significant ecchymosis and on another she injured her shoulder.   She ultimately underwent an EP study with normal conduction, no sustained ventricular arrhythmias and significant PVCs s/p ablation (suppression, not elimination).  She also had an loop recorder placed.\par \par She continued to have falling episodes where she "thinks" she may have lost consciousness.   She saw a neurologist and states the work up was unremarkable.  She underwent a tilt table test with vaso depressive vasovagal syncope. She had an episode of paroxysmal atrial fibrillation that she was asymptomatic from and was started on anticoagulation - which at some point was stopped (she is unclear when).  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  She presents for follow up with no further falling/fainting episodes.  \par \par She had one episode of sharp chest pain lasting seconds while laying in bed.  No exertional symptoms.  She has had paroxysmal atrial fibrillation in the past.  We have discussed concern for brain aneurysm but the doctors she gave us to contact hadn't seen her in years.  She was recommended to follow up with them and has not as of yet.  No palpitations, SOB, syncope or near syncope.  Overall she feels well.  No device related complaints. \par  \par

## 2021-03-04 NOTE — PROCEDURE
[No] : not [NSR] : normal sinus rhythm [Pacemaker] : pacemaker [DDD] : DDD [Threshold Testing Performed] : Threshold testing was performed [Lead Imp:  ___ohms] : lead impedance was [unfilled] ohms [Sensing Amplitude ___mv] : sensing amplitude was [unfilled] mv [___V @] : [unfilled] V [___ ms] : [unfilled] ms [None] : none [de-identified] : [employer] [de-identified] : Mimi [de-identified] : [id number] [de-identified] : 12/17/19 [de-identified] : 50/130 [de-identified] : 11.2 years [de-identified] :  AP 7.3%\par  36.4%\par 11/2020 about 2 hours of afib

## 2021-03-04 NOTE — REVIEW OF SYSTEMS
[see HPI] : see HPI [Negative] : Heme/Lymph [Fever] : no fever [Recent Weight Gain (___ Lbs)] : no recent weight gain [Chills] : no chills [Feeling Fatigued] : not feeling fatigued [Recent Weight Loss (___ Lbs)] : no recent weight loss [Dizziness] : no dizziness

## 2021-03-04 NOTE — PHYSICAL EXAM
[General Appearance - Well Developed] : well developed [Normal Appearance] : normal appearance [Well Groomed] : well groomed [General Appearance - Well Nourished] : well nourished [No Deformities] : no deformities [General Appearance - In No Acute Distress] : no acute distress [Heart Rate And Rhythm] : heart rate and rhythm were normal [Heart Sounds] : normal S1 and S2 [Edema] : no peripheral edema present [] : no respiratory distress [Respiration, Rhythm And Depth] : normal respiratory rhythm and effort [Exaggerated Use Of Accessory Muscles For Inspiration] : no accessory muscle use [Clean] : clean [Dry] : dry [Well-Healed] : well-healed [Normal Conjunctiva] : the conjunctiva exhibited no abnormalities [Normal Oral Mucosa] : normal oral mucosa [Normal Jugular Venous V Waves Present] : normal jugular venous V waves present [Abnormal Walk] : normal gait [Skin Color & Pigmentation] : normal skin color and pigmentation [Oriented To Time, Place, And Person] : oriented to person, place, and time [Impaired Insight] : insight and judgment were intact [Affect] : the affect was normal [Mood] : the mood was normal [No Anxiety] : not feeling anxious [Palpable Crepitus] : no palpable crepitus [Bleeding] : no active bleeding [Foul Odor] : no foul smell [Purulent Drainage] : no purulent drainage [Serosanguineous Drainage] : no serosanquineous drainage [Serous Drainage] : no serous drainage [Erythema] : not erythematous [Warm] : not warm [Tender] : not tender [Indurated] : not indurated [Fluctuant] : not fluctuant [FreeTextEntry1] : mid L chest

## 2021-04-06 ENCOUNTER — APPOINTMENT (OUTPATIENT)
Dept: HEART AND VASCULAR | Facility: CLINIC | Age: 86
End: 2021-04-06
Payer: MEDICARE

## 2021-04-06 ENCOUNTER — NON-APPOINTMENT (OUTPATIENT)
Age: 86
End: 2021-04-06

## 2021-04-06 PROCEDURE — 93294 REM INTERROG EVL PM/LDLS PM: CPT

## 2021-04-06 PROCEDURE — 93296 REM INTERROG EVL PM/IDS: CPT

## 2021-04-23 ENCOUNTER — APPOINTMENT (OUTPATIENT)
Dept: INTERNAL MEDICINE | Facility: CLINIC | Age: 86
End: 2021-04-23
Payer: MEDICARE

## 2021-04-23 ENCOUNTER — RX RENEWAL (OUTPATIENT)
Age: 86
End: 2021-04-23

## 2021-04-23 VITALS
WEIGHT: 120 LBS | DIASTOLIC BLOOD PRESSURE: 70 MMHG | TEMPERATURE: 97.7 F | HEART RATE: 68 BPM | RESPIRATION RATE: 16 BRPM | SYSTOLIC BLOOD PRESSURE: 118 MMHG | BODY MASS INDEX: 21.26 KG/M2 | HEIGHT: 63 IN | OXYGEN SATURATION: 97 %

## 2021-04-23 PROCEDURE — 99072 ADDL SUPL MATRL&STAF TM PHE: CPT

## 2021-04-23 PROCEDURE — 99214 OFFICE O/P EST MOD 30 MIN: CPT

## 2021-04-23 NOTE — PLAN
[FreeTextEntry1] : \par \par \par Left knee pain -referral for ortho\par \par Right hand weakness and Cerebral aneurysm - referral for neurology\par \par Edema- leg elevtation, compresion stockings and start low dose hctz\par - f/up 1 month for bp check and labs

## 2021-04-23 NOTE — HISTORY OF PRESENT ILLNESS
[de-identified] : \par Has been getting right sided hand numbness - hard to  items\par Having b/l leg swelling.  \par Neurologist - with hx of cerebral aneurysm - EP talked about starting Eliquis but needed approval from neurology first.  \par  - was going to R - last scan one year ago\par Taking metoprolol and and asa

## 2021-04-23 NOTE — PHYSICAL EXAM
[Normal] : no CVA or spinal tenderness [de-identified] : decreased ROM on 2nd toes on left and right feet, pain appreciated during flexion of feet, a bone spur on 2nd to of both feet

## 2021-05-11 ENCOUNTER — APPOINTMENT (OUTPATIENT)
Dept: NEUROLOGY | Facility: CLINIC | Age: 86
End: 2021-05-11
Payer: MEDICARE

## 2021-05-11 VITALS
TEMPERATURE: 97.5 F | SYSTOLIC BLOOD PRESSURE: 112 MMHG | HEIGHT: 63 IN | RESPIRATION RATE: 16 BRPM | WEIGHT: 120 LBS | OXYGEN SATURATION: 95 % | HEART RATE: 81 BPM | DIASTOLIC BLOOD PRESSURE: 70 MMHG | BODY MASS INDEX: 21.26 KG/M2

## 2021-05-11 PROCEDURE — 99204 OFFICE O/P NEW MOD 45 MIN: CPT

## 2021-05-11 PROCEDURE — 99072 ADDL SUPL MATRL&STAF TM PHE: CPT

## 2021-05-19 ENCOUNTER — APPOINTMENT (OUTPATIENT)
Dept: NEUROLOGY | Facility: CLINIC | Age: 86
End: 2021-05-19
Payer: MEDICARE

## 2021-05-19 PROCEDURE — 95816 EEG AWAKE AND DROWSY: CPT

## 2021-05-19 PROCEDURE — 99072 ADDL SUPL MATRL&STAF TM PHE: CPT

## 2021-05-20 ENCOUNTER — APPOINTMENT (OUTPATIENT)
Dept: NEUROLOGY | Facility: CLINIC | Age: 86
End: 2021-05-20

## 2021-05-20 PROCEDURE — 95700 EEG CONT REC W/VID EEG TECH: CPT

## 2021-05-20 PROCEDURE — 99072 ADDL SUPL MATRL&STAF TM PHE: CPT

## 2021-05-20 PROCEDURE — 95719 EEG PHYS/QHP EA INCR W/O VID: CPT

## 2021-05-20 PROCEDURE — 95708 EEG WO VID EA 12-26HR UNMNTR: CPT

## 2021-05-21 ENCOUNTER — APPOINTMENT (OUTPATIENT)
Dept: INTERNAL MEDICINE | Facility: CLINIC | Age: 86
End: 2021-05-21
Payer: MEDICARE

## 2021-05-21 VITALS
HEIGHT: 63 IN | OXYGEN SATURATION: 97 % | BODY MASS INDEX: 20.38 KG/M2 | HEART RATE: 123 BPM | TEMPERATURE: 97.4 F | WEIGHT: 115 LBS | SYSTOLIC BLOOD PRESSURE: 120 MMHG | DIASTOLIC BLOOD PRESSURE: 60 MMHG

## 2021-05-21 DIAGNOSIS — R60.0 LOCALIZED EDEMA: ICD-10-CM

## 2021-05-21 PROCEDURE — 99214 OFFICE O/P EST MOD 30 MIN: CPT

## 2021-05-21 PROCEDURE — 36415 COLL VENOUS BLD VENIPUNCTURE: CPT

## 2021-05-21 PROCEDURE — 99072 ADDL SUPL MATRL&STAF TM PHE: CPT

## 2021-05-21 NOTE — HISTORY OF PRESENT ILLNESS
[FreeTextEntry1] : weight loss [de-identified] : 88 yo f with cerebral aneurysm, gait instability, OA, PVC,syncope, glaucoma, pAF\par She is in the process of being worked up for syncope.loc, sp eeg yesterday. \par legs and feet have improved with the HCTZ. No dizziness.\par knee pain- right more than left.  using a cane with walking \par Having diarrhea - eats a poor diet

## 2021-05-21 NOTE — REVIEW OF SYSTEMS
[Joint Pain] : joint pain [Joint Stiffness] : no joint stiffness [Joint Swelling] : joint swelling [Back Pain] : no back pain [Itching] : no itching [Skin Rash] : skin rash [Negative] : Heme/Lymph

## 2021-05-21 NOTE — PHYSICAL EXAM
[Normal] : no CVA or spinal tenderness [de-identified] : decreased ROM on 2nd toes on left and right feet, pain appreciated during flexion of feet, a bone spur on 2nd to of both feet

## 2021-05-22 LAB
ANION GAP SERPL CALC-SCNC: 16 MMOL/L
BUN SERPL-MCNC: 16 MG/DL
CALCIUM SERPL-MCNC: 10.3 MG/DL
CHLORIDE SERPL-SCNC: 99 MMOL/L
CO2 SERPL-SCNC: 26 MMOL/L
CREAT SERPL-MCNC: 0.9 MG/DL
GLUCOSE SERPL-MCNC: 104 MG/DL
POTASSIUM SERPL-SCNC: 3.5 MMOL/L
SODIUM SERPL-SCNC: 142 MMOL/L

## 2021-06-11 ENCOUNTER — RESULT REVIEW (OUTPATIENT)
Age: 86
End: 2021-06-11

## 2021-06-11 ENCOUNTER — OUTPATIENT (OUTPATIENT)
Dept: OUTPATIENT SERVICES | Facility: HOSPITAL | Age: 86
LOS: 1 days | End: 2021-06-11
Payer: MEDICARE

## 2021-06-11 ENCOUNTER — APPOINTMENT (OUTPATIENT)
Dept: CT IMAGING | Facility: HOSPITAL | Age: 86
End: 2021-06-11

## 2021-06-11 DIAGNOSIS — Z90.49 ACQUIRED ABSENCE OF OTHER SPECIFIED PARTS OF DIGESTIVE TRACT: Chronic | ICD-10-CM

## 2021-06-11 DIAGNOSIS — Z98.890 OTHER SPECIFIED POSTPROCEDURAL STATES: Chronic | ICD-10-CM

## 2021-06-11 PROCEDURE — 70496 CT ANGIOGRAPHY HEAD: CPT

## 2021-06-11 PROCEDURE — 70498 CT ANGIOGRAPHY NECK: CPT | Mod: 26

## 2021-06-11 PROCEDURE — 70498 CT ANGIOGRAPHY NECK: CPT

## 2021-06-11 PROCEDURE — 70496 CT ANGIOGRAPHY HEAD: CPT | Mod: 26

## 2021-06-16 ENCOUNTER — APPOINTMENT (OUTPATIENT)
Dept: NEUROLOGY | Facility: CLINIC | Age: 86
End: 2021-06-16
Payer: MEDICARE

## 2021-06-16 VITALS
OXYGEN SATURATION: 100 % | BODY MASS INDEX: 20.38 KG/M2 | SYSTOLIC BLOOD PRESSURE: 133 MMHG | HEART RATE: 75 BPM | DIASTOLIC BLOOD PRESSURE: 75 MMHG | WEIGHT: 115 LBS | TEMPERATURE: 98.6 F | HEIGHT: 63 IN

## 2021-06-16 DIAGNOSIS — I67.1 CEREBRAL ANEURYSM, NONRUPTURED: ICD-10-CM

## 2021-06-16 PROCEDURE — 99072 ADDL SUPL MATRL&STAF TM PHE: CPT

## 2021-06-16 PROCEDURE — 99214 OFFICE O/P EST MOD 30 MIN: CPT

## 2021-06-16 NOTE — ASSESSMENT
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 Pt is sent by Dr. Chow for anticoagulation opinion for a.fib mgt. \par Pt reports her parents  at a young age and she doesn't know the cause of their death, she denies h/o tobacco/drug use/HTN. \par \par Neuro exam-AAOx3, CN 2-12 grossly intact, motor-5/5 in A4E’s, sensory-grossly normal, reflexes-1+ throughout, gait-slow with cane. \par \par h/o treated aneurysm\par -on aspirin, can switch to therapeutic DOAC for mgt of a.fib (will send note to Dr. Horvath)\par -pt will find the name of her neurosurgeon so we can get the records for file\par -recommended screening of 1st degree relatives for aneurysms-pt will tell her children to get tested\par -risk factor mgt through PCP (pt/family expressed understanding): BP<120/80(need ACEI+/-others), LDL bet 50 & 70 with statins, A1C<7.0, medication adherence, diet/exercise/weight loss/lifestyle modification.\par -recommended to seek immediate medical attentions with any signs/symptoms of stroke(BEFAST)-call 911\par \par \par \par

## 2021-06-17 DIAGNOSIS — I48.0 PAROXYSMAL ATRIAL FIBRILLATION: ICD-10-CM

## 2021-06-17 RX ORDER — ASPIRIN 81 MG
81 TABLET, DELAYED RELEASE (ENTERIC COATED) ORAL
Refills: 0 | Status: DISCONTINUED | COMMUNITY
End: 2021-06-17

## 2021-06-24 ENCOUNTER — RX RENEWAL (OUTPATIENT)
Age: 86
End: 2021-06-24

## 2021-07-07 ENCOUNTER — NON-APPOINTMENT (OUTPATIENT)
Age: 86
End: 2021-07-07

## 2021-07-07 ENCOUNTER — APPOINTMENT (OUTPATIENT)
Dept: HEART AND VASCULAR | Facility: CLINIC | Age: 86
End: 2021-07-07
Payer: MEDICARE

## 2021-07-07 PROCEDURE — 93296 REM INTERROG EVL PM/IDS: CPT

## 2021-07-07 PROCEDURE — 93294 REM INTERROG EVL PM/LDLS PM: CPT

## 2021-08-20 ENCOUNTER — APPOINTMENT (OUTPATIENT)
Dept: INTERNAL MEDICINE | Facility: CLINIC | Age: 86
End: 2021-08-20
Payer: MEDICARE

## 2021-08-20 VITALS
SYSTOLIC BLOOD PRESSURE: 100 MMHG | WEIGHT: 115 LBS | RESPIRATION RATE: 16 BRPM | BODY MASS INDEX: 20.38 KG/M2 | HEART RATE: 68 BPM | TEMPERATURE: 97.6 F | HEIGHT: 63 IN | DIASTOLIC BLOOD PRESSURE: 60 MMHG | OXYGEN SATURATION: 97 %

## 2021-08-20 DIAGNOSIS — M25.562 PAIN IN LEFT KNEE: ICD-10-CM

## 2021-08-20 DIAGNOSIS — R26.81 UNSTEADINESS ON FEET: ICD-10-CM

## 2021-08-20 PROCEDURE — 99214 OFFICE O/P EST MOD 30 MIN: CPT

## 2021-08-20 RX ORDER — HYDROCHLOROTHIAZIDE 12.5 MG/1
12.5 TABLET ORAL DAILY
Qty: 90 | Refills: 0 | Status: DISCONTINUED | COMMUNITY
Start: 2021-04-23 | End: 2021-08-20

## 2021-08-20 RX ORDER — DICLOFENAC SODIUM 10 MG/G
1 GEL TOPICAL TWICE DAILY
Qty: 1 | Refills: 2 | Status: DISCONTINUED | COMMUNITY
Start: 2019-09-05 | End: 2021-08-20

## 2021-08-20 NOTE — HISTORY OF PRESENT ILLNESS
[FreeTextEntry1] : leg pain [de-identified] : 86 yo f with cerebral aneurysm, gait instability, OA, PVC,syncope, glaucoma, pAF\par Walking regularly, unsteady- uses a cane.\par Leg pain and swelling \par - takes tylenol sometimes but does not like to take it.

## 2021-08-20 NOTE — PHYSICAL EXAM
[Regular Rhythm] : with a regular rhythm [Normal S1, S2] : normal S1 and S2 [Normal] : no CVA or spinal tenderness [de-identified] : trace edema b/l le   varicose veins present b/l le [de-identified] : decreased ROM on 2nd toes on left and right feet, pain appreciated during flexion of feet, a bone spur on 2nd to of both feet

## 2021-08-20 NOTE — PLAN
[FreeTextEntry1] : \par \par HTN_ BP low today - stop HCTZ\par \par pAF - cont DOAC and b blocker\par \par Knee pain - send for ortho eval - consider CSI\par \par Tylenol prn for [ain

## 2021-10-06 ENCOUNTER — NON-APPOINTMENT (OUTPATIENT)
Age: 86
End: 2021-10-06

## 2021-10-06 ENCOUNTER — APPOINTMENT (OUTPATIENT)
Dept: HEART AND VASCULAR | Facility: CLINIC | Age: 86
End: 2021-10-06
Payer: MEDICARE

## 2021-10-06 PROCEDURE — 93294 REM INTERROG EVL PM/LDLS PM: CPT

## 2021-10-06 PROCEDURE — 93296 REM INTERROG EVL PM/IDS: CPT

## 2021-10-13 ENCOUNTER — NON-APPOINTMENT (OUTPATIENT)
Age: 86
End: 2021-10-13

## 2021-10-13 ENCOUNTER — APPOINTMENT (OUTPATIENT)
Dept: INTERNAL MEDICINE | Facility: CLINIC | Age: 86
End: 2021-10-13

## 2021-10-21 ENCOUNTER — RX RENEWAL (OUTPATIENT)
Age: 86
End: 2021-10-21

## 2021-12-16 ENCOUNTER — NON-APPOINTMENT (OUTPATIENT)
Age: 86
End: 2021-12-16

## 2021-12-16 RX ORDER — APIXABAN 2.5 MG/1
2.5 TABLET, FILM COATED ORAL
Qty: 60 | Refills: 3 | Status: DISCONTINUED | COMMUNITY
Start: 2021-06-17 | End: 2021-12-16

## 2021-12-16 RX ORDER — ACETAMINOPHEN AND CODEINE 300; 30 MG/1; MG/1
300-30 TABLET ORAL
Qty: 15 | Refills: 0 | Status: ACTIVE | COMMUNITY
Start: 2021-08-26 | End: 1900-01-01

## 2021-12-16 RX ORDER — ACETAMINOPHEN 500 MG/1
500 TABLET ORAL
Qty: 60 | Refills: 5 | Status: ACTIVE | COMMUNITY
Start: 2021-04-23 | End: 1900-01-01

## 2022-01-05 ENCOUNTER — NON-APPOINTMENT (OUTPATIENT)
Age: 87
End: 2022-01-05

## 2022-01-05 ENCOUNTER — APPOINTMENT (OUTPATIENT)
Dept: HEART AND VASCULAR | Facility: CLINIC | Age: 87
End: 2022-01-05
Payer: MEDICARE

## 2022-01-05 PROCEDURE — 93296 REM INTERROG EVL PM/IDS: CPT

## 2022-01-05 PROCEDURE — 93294 REM INTERROG EVL PM/LDLS PM: CPT

## 2022-01-20 ENCOUNTER — NON-APPOINTMENT (OUTPATIENT)
Age: 87
End: 2022-01-20

## 2022-01-20 ENCOUNTER — INPATIENT (INPATIENT)
Facility: HOSPITAL | Age: 87
LOS: 14 days | Discharge: HOSPICE | DRG: 374 | End: 2022-02-04
Attending: SURGERY | Admitting: SURGERY
Payer: MEDICARE

## 2022-01-20 VITALS
HEART RATE: 104 BPM | OXYGEN SATURATION: 97 % | WEIGHT: 104.06 LBS | HEIGHT: 63 IN | TEMPERATURE: 99 F | RESPIRATION RATE: 18 BRPM | SYSTOLIC BLOOD PRESSURE: 106 MMHG | DIASTOLIC BLOOD PRESSURE: 73 MMHG

## 2022-01-20 DIAGNOSIS — Z90.49 ACQUIRED ABSENCE OF OTHER SPECIFIED PARTS OF DIGESTIVE TRACT: Chronic | ICD-10-CM

## 2022-01-20 DIAGNOSIS — Z98.890 OTHER SPECIFIED POSTPROCEDURAL STATES: Chronic | ICD-10-CM

## 2022-01-20 LAB
ALBUMIN SERPL ELPH-MCNC: 2.9 G/DL — LOW (ref 3.4–5)
ALP SERPL-CCNC: 67 U/L — SIGNIFICANT CHANGE UP (ref 40–120)
ALT FLD-CCNC: 6 U/L — LOW (ref 12–42)
ANION GAP SERPL CALC-SCNC: 7 MMOL/L — LOW (ref 9–16)
APPEARANCE UR: CLEAR — SIGNIFICANT CHANGE UP
APTT BLD: 27.6 SEC — SIGNIFICANT CHANGE UP (ref 27.5–35.5)
AST SERPL-CCNC: 14 U/L — LOW (ref 15–37)
BACTERIA # UR AUTO: PRESENT /HPF
BASOPHILS # BLD AUTO: 0.01 K/UL — SIGNIFICANT CHANGE UP (ref 0–0.2)
BASOPHILS NFR BLD AUTO: 0.1 % — SIGNIFICANT CHANGE UP (ref 0–2)
BILIRUB SERPL-MCNC: 0.9 MG/DL — SIGNIFICANT CHANGE UP (ref 0.2–1.2)
BILIRUB UR-MCNC: ABNORMAL
BUN SERPL-MCNC: 36 MG/DL — HIGH (ref 7–23)
CALCIUM SERPL-MCNC: 9.8 MG/DL — SIGNIFICANT CHANGE UP (ref 8.5–10.5)
CHLORIDE SERPL-SCNC: 106 MMOL/L — SIGNIFICANT CHANGE UP (ref 96–108)
CO2 SERPL-SCNC: 32 MMOL/L — HIGH (ref 22–31)
COLOR SPEC: YELLOW — SIGNIFICANT CHANGE UP
CREAT SERPL-MCNC: 0.82 MG/DL — SIGNIFICANT CHANGE UP (ref 0.5–1.3)
DIFF PNL FLD: NEGATIVE — SIGNIFICANT CHANGE UP
EOSINOPHIL # BLD AUTO: 0 K/UL — SIGNIFICANT CHANGE UP (ref 0–0.5)
EOSINOPHIL NFR BLD AUTO: 0 % — SIGNIFICANT CHANGE UP (ref 0–6)
EPI CELLS # UR: ABNORMAL /HPF (ref 0–5)
FLUAV H1 2009 PAND RNA SPEC QL NAA+PROBE: SIGNIFICANT CHANGE UP
FLUAV H1 RNA SPEC QL NAA+PROBE: SIGNIFICANT CHANGE UP
FLUAV H3 RNA SPEC QL NAA+PROBE: SIGNIFICANT CHANGE UP
FLUAV SUBTYP SPEC NAA+PROBE: SIGNIFICANT CHANGE UP
FLUBV RNA SPEC QL NAA+PROBE: SIGNIFICANT CHANGE UP
GLUCOSE SERPL-MCNC: 133 MG/DL — HIGH (ref 70–99)
GLUCOSE UR QL: NEGATIVE — SIGNIFICANT CHANGE UP
HCT VFR BLD CALC: 37 % — SIGNIFICANT CHANGE UP (ref 34.5–45)
HGB BLD-MCNC: 11.4 G/DL — LOW (ref 11.5–15.5)
IMM GRANULOCYTES NFR BLD AUTO: 0.2 % — SIGNIFICANT CHANGE UP (ref 0–1.5)
INR BLD: 1.11 — SIGNIFICANT CHANGE UP (ref 0.88–1.16)
KETONES UR-MCNC: 15 MG/DL
LACTATE SERPL-SCNC: 1 MMOL/L — SIGNIFICANT CHANGE UP (ref 0.4–2)
LEUKOCYTE ESTERASE UR-ACNC: ABNORMAL
LYMPHOCYTES # BLD AUTO: 0.85 K/UL — LOW (ref 1–3.3)
LYMPHOCYTES # BLD AUTO: 10.1 % — LOW (ref 13–44)
MCHC RBC-ENTMCNC: 28.4 PG — SIGNIFICANT CHANGE UP (ref 27–34)
MCHC RBC-ENTMCNC: 30.8 GM/DL — LOW (ref 32–36)
MCV RBC AUTO: 92.3 FL — SIGNIFICANT CHANGE UP (ref 80–100)
MONOCYTES # BLD AUTO: 0.78 K/UL — SIGNIFICANT CHANGE UP (ref 0–0.9)
MONOCYTES NFR BLD AUTO: 9.3 % — SIGNIFICANT CHANGE UP (ref 2–14)
NEUTROPHILS # BLD AUTO: 6.72 K/UL — SIGNIFICANT CHANGE UP (ref 1.8–7.4)
NEUTROPHILS NFR BLD AUTO: 80.3 % — HIGH (ref 43–77)
NITRITE UR-MCNC: NEGATIVE — SIGNIFICANT CHANGE UP
NRBC # BLD: 0 /100 WBCS — SIGNIFICANT CHANGE UP (ref 0–0)
NT-PROBNP SERPL-SCNC: 2236 PG/ML — HIGH
PH UR: 6 — SIGNIFICANT CHANGE UP (ref 5–8)
PLATELET # BLD AUTO: 269 K/UL — SIGNIFICANT CHANGE UP (ref 150–400)
POTASSIUM SERPL-MCNC: 3.2 MMOL/L — LOW (ref 3.5–5.3)
POTASSIUM SERPL-SCNC: 3.2 MMOL/L — LOW (ref 3.5–5.3)
PROT SERPL-MCNC: 6.6 G/DL — SIGNIFICANT CHANGE UP (ref 6.4–8.2)
PROT UR-MCNC: ABNORMAL MG/DL
PROTHROM AB SERPL-ACNC: 13.3 SEC — SIGNIFICANT CHANGE UP (ref 10.6–13.6)
RAPID RVP RESULT: SIGNIFICANT CHANGE UP
RBC # BLD: 4.01 M/UL — SIGNIFICANT CHANGE UP (ref 3.8–5.2)
RBC # FLD: 14.3 % — SIGNIFICANT CHANGE UP (ref 10.3–14.5)
RBC CASTS # UR COMP ASSIST: < 5 /HPF — SIGNIFICANT CHANGE UP
SARS-COV-2 RNA SPEC QL NAA+PROBE: SIGNIFICANT CHANGE UP
SARS-COV-2 RNA SPEC QL NAA+PROBE: SIGNIFICANT CHANGE UP
SODIUM SERPL-SCNC: 145 MMOL/L — SIGNIFICANT CHANGE UP (ref 132–145)
SP GR SPEC: 1.02 — SIGNIFICANT CHANGE UP (ref 1–1.03)
TROPONIN I, HIGH SENSITIVITY RESULT: 48.6 NG/L — SIGNIFICANT CHANGE UP
UROBILINOGEN FLD QL: 1 E.U./DL — SIGNIFICANT CHANGE UP
WBC # BLD: 8.38 K/UL — SIGNIFICANT CHANGE UP (ref 3.8–10.5)
WBC # FLD AUTO: 8.38 K/UL — SIGNIFICANT CHANGE UP (ref 3.8–10.5)
WBC UR QL: > 10 /HPF

## 2022-01-20 PROCEDURE — 71045 X-RAY EXAM CHEST 1 VIEW: CPT | Mod: 26,76

## 2022-01-20 PROCEDURE — 74177 CT ABD & PELVIS W/CONTRAST: CPT | Mod: 26

## 2022-01-20 PROCEDURE — 70450 CT HEAD/BRAIN W/O DYE: CPT | Mod: 26

## 2022-01-20 PROCEDURE — 99285 EMERGENCY DEPT VISIT HI MDM: CPT

## 2022-01-20 PROCEDURE — 93010 ELECTROCARDIOGRAM REPORT: CPT

## 2022-01-20 RX ORDER — HEPARIN SODIUM 5000 [USP'U]/ML
5000 INJECTION INTRAVENOUS; SUBCUTANEOUS EVERY 8 HOURS
Refills: 0 | Status: COMPLETED | OUTPATIENT
Start: 2022-01-20 | End: 2022-01-21

## 2022-01-20 RX ORDER — ASPIRIN/CALCIUM CARB/MAGNESIUM 324 MG
1 TABLET ORAL
Qty: 0 | Refills: 0 | DISCHARGE

## 2022-01-20 RX ORDER — LATANOPROST 0.05 MG/ML
1 SOLUTION/ DROPS OPHTHALMIC; TOPICAL
Qty: 0 | Refills: 0 | DISCHARGE

## 2022-01-20 RX ORDER — CEFTRIAXONE 500 MG/1
1000 INJECTION, POWDER, FOR SOLUTION INTRAMUSCULAR; INTRAVENOUS ONCE
Refills: 0 | Status: COMPLETED | OUTPATIENT
Start: 2022-01-20 | End: 2022-01-20

## 2022-01-20 RX ORDER — SODIUM CHLORIDE 9 MG/ML
1450 INJECTION INTRAMUSCULAR; INTRAVENOUS; SUBCUTANEOUS ONCE
Refills: 0 | Status: COMPLETED | OUTPATIENT
Start: 2022-01-20 | End: 2022-01-20

## 2022-01-20 RX ORDER — SODIUM CHLORIDE 9 MG/ML
1000 INJECTION, SOLUTION INTRAVENOUS
Refills: 0 | Status: DISCONTINUED | OUTPATIENT
Start: 2022-01-20 | End: 2022-01-24

## 2022-01-20 RX ADMIN — CEFTRIAXONE 100 MILLIGRAM(S): 500 INJECTION, POWDER, FOR SOLUTION INTRAMUSCULAR; INTRAVENOUS at 14:08

## 2022-01-20 RX ADMIN — SODIUM CHLORIDE 1450 MILLILITER(S): 9 INJECTION INTRAMUSCULAR; INTRAVENOUS; SUBCUTANEOUS at 14:11

## 2022-01-20 NOTE — ED ADULT NURSE NOTE - OBJECTIVE STATEMENT
pt awake and answering all questions appropriately at the time, Blanca , daughter in attendance states pt fluctuates with lucidity , presenting problem as above

## 2022-01-20 NOTE — ED PROVIDER NOTE - CLINICAL SUMMARY MEDICAL DECISION MAKING FREE TEXT BOX
86yo F hx of arrhythmia, on xarelto, hx of cerebral aneurysm s/p coil, hx of dementia, presents with progressively worsening confusion and refusal to eat months, worst over the past 5 days, assoc w diffuse abd pain for indeterminate period of time.  Exam- afebrile, VSS, comfortable appearing, A&Ox3 but confused, abd soft, distended, diffusely tender.  CT a/p shows rectal mass with obstruction.  Discussed case with surgery team at ; will admit to surgery at .  Workup also with UTI- pt given ceftriaxone. No e/o sepsis.  CT head neg.   CT a/p also shows old L2 vertebral fracture. No leg weakness or bowel or bladder dysfunction; less likely cord compression.

## 2022-01-20 NOTE — ED ADULT NURSE NOTE - NSIMPLEMENTINTERV_GEN_ALL_ED
Implemented All Fall Risk Interventions:  [place] to call system. Call bell, personal items and telephone within reach. Instruct patient to call for assistance. Room bathroom lighting operational. Non-slip footwear when patient is off stretcher. Physically safe environment: no spills, clutter or unnecessary equipment. Stretcher in lowest position, wheels locked, appropriate side rails in place. Provide visual cue, wrist band, yellow gown, etc. Monitor gait and stability. Monitor for mental status changes and reorient to person, place, and time. Review medications for side effects contributing to fall risk. Reinforce activity limits and safety measures with patient and family.

## 2022-01-20 NOTE — H&P ADULT - NSHPLABSRESULTS_GEN_ALL_CORE
11.4   8.38  )-----------( 269      ( 20 Jan 2022 14:10 )             37.0   01-20    146<H>  |  102  |  27<H>  ----------------------------<  114<H>  3.5   |  27  |  0.70    Ca    9.7      20 Jan 2022 23:48  Mg     2.2     01-20    TPro  6.6  /  Alb  2.9<L>  /  TBili  0.9  /  DBili  x   /  AST  14<L>  /  ALT  6<L>  /  AlkPhos  67  01-20

## 2022-01-20 NOTE — H&P ADULT - NSHPPHYSICALEXAM_GEN_ALL_CORE
Physical Exam:  General: NAD, resting comfortably in bed, prominent muscle wasting  Pulmonary: Nonlabored, no respiratory distress, coarse breath sounds R>L  Cardiovascular: sinus tachycardia   Abdominal: Soft, distended, resonant to percussion, tenderness to LLQ>R no rebound or guarding  Extremities: WWP, normal strength  Neuro: A/O x 2, no focal deficits, normal motor/sensation  Pulses: palpable distal pulses
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
## 2022-01-20 NOTE — ED ADULT TRIAGE NOTE - CHIEF COMPLAINT QUOTE
Pt BIBA from home in company of daughter from home c/o poor PO intake, weakness and malaise.  Pt denies falls or injury.  Pt A&Ox1 at baseline.  Pt hypotensive on EMS arrival, given NS, BP improved to 106/73.

## 2022-01-20 NOTE — CHART NOTE - NSCHARTNOTEFT_GEN_A_CORE
Senior surgery note    Patient seen and examined. Daughter at bedside gave most of information as patient is not completely oriented at baseline.     86 yo female PMH PVCs s/p ablation and maker (2019), a fib (on xarelto and metoprolol), dementia (frequent confusion), back injury (2021), intracranial aneurysm s/p coil presents with progressively worsening generalized weakness and anorexia x 5 days (per daughter). Per daughter, patient has not been eating well for months, but has not eaten anything at all in past 5 days while she was away and niece was looking after her. Most recent bowel function is unclear as patient is forgetful, but daughter did see one episode in the bed that happened sometime within this week. Daughter states bowel habits have been changing, last few months the stool has been darker, with no visible blood, and occur once every other day or so. Colonoscopy about 10 years ago (maybe more). 20 lb weight loss over several months per henrry. Also has been having a wet cough for several days. Daughter states that patient has been resistant to going to doctor for several months, but she finally was able to convince her today due to her cough and abdominal pain. Most recently took medications on Tuesday (2 days ago- metoprolol and xarelto). Of note, patient was an orphan, but no children with cancer, no personal history of cancer. Never smoker. No prior known surgery, although daughter said that when she was very young, she was told patient had a surgery for her "lady parts," but no one knows what was done at that time or for what indication.    Vitals reveal tachycardia to 100s. Exam distended, but compressible, non-peritonitic. Right breath sounds coarse, and she does have cough during exam. CT A/P with IV contrast reveals irregularly marginated annular rectal soft tissue thickening with luminal narrowing 9-12 cm from anal verge with upstream dilated large bowel filled w/ layering liquid stool and air fluid level, also mildly dilated small bowel loops w/ air fluid levels. Chest (as much of it as we can see on CT A/P) reveals multiple groundglass opacities in right lower and middle lobes w/ dense consolidation.     Labs significant for normal WBC w/ left shift 80%, potassium 3.2, BNP 2236. UA w/ trace LE.  At [facility], received ceftriaxone x1 (they said for UTI), and 1450 cc NS bolus.     Plan:   No need for emergent decompression at the given moment.  GI for possible stent for decompression.   Tomorrow attending with present daughter w/ options of stent for bowel prep w/ LAR vs Holguin procedure   Abx to treat for CAP  Will give one dose of HSQ tonight, and then hold for possibility of GI stent tomorrow. Will hold of on heparin drip for now given borderline CHADSVASC and in sinus tachycardic (not a fib) currently.   Cards and EP consult for preop risk stratification, as well as for eval of CHF given BNP 2200. Obtain ECHO.  Needs CT Chest in AM to complete suspected colon CA staging  Will send CEA tonight for baseline  Repeat BMP now, correct electrolytes  Reyes for urinary retention. Monitor I/O  NPO. IV fluid resuscitation slightly lower than maintenance given elevated BNP, however given dry mucous membranes, still needs the fluids.  Will hold off on NG tube for now given no nausea or vomiting.   Serial abdominal exam.     Discussed management with the patient's daughter, who is also the POA, who agrees with the plan.   Also briefly discussed the different treatment options. She is familiar with colostomies, and would likely be amenable to this if that is the best treatment option.   Has no wishes for DNR/DNI at this time. Full code.

## 2022-01-20 NOTE — H&P ADULT - ASSESSMENT
88yo female with afib (on Xarelto) dementia presents with XX duration of weakness and poor PO intake. Afebrile, tachycardic, normotensive. Exam significant for distention, with LLQ tenderness, no rebound or guarding. Lungs coarse breath sounds with cough. Labs demonstrating normal WBC and LA, elevated BNP, CT with evidence of large bowel obstruction, narrowing of the rectum 9-12cm from the anal verge. Impression is large bowel obstruction, differential includes malignancy vs infectious/inflammatory.     - Admit to Team 4  - NPO/IVF  - Abx: Ceftriaxone, Azithromycin to cover for CAP  - SCD/SQH  - IS/OOB  - Holding Xarelto (C2V 3-4), metoprolol IV conversion  - Staging CT chest  - TTE/EKG  - GI c/s  - Cards c/s  - EP c/s    Patient discussed, seen and examined with Chief Resident

## 2022-01-20 NOTE — H&P ADULT - ATTENDING COMMENTS
Patient seen and examined, imaging reviewed.  She denies pain. No BM, unable to tell me about flatus.  Currently in A-fib with RVR.  Thin with muscle wasting.  Abd soft, mod distended, minimal TTP without guarding in LLQ/flank    hgb 11  Albumin 2.9    CT demonstrates upper rectal/rectosigmoid mass causing large bowel obstruction. Incompetent ileocecal valve with small bowel dilation. Possible RLL pneumonia. No obvious liver metastases.  Atrial fibrillation.    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  Malnutrition.  RLL pneumonia. Atrial fibrillation with RVR.  1. Metoprolol, control HR.  2. GI to evaluate for possible stent placement.  3. CT chest to evaluate for metastases.  Check CEA  4. IV hydration.  5. If unable to stent, will need diverting colostomy.  6. Needs medical optimization.  Off Xarelto for ? a few days. Hold anticoagulation given imminent procedures.  7. TPN if obstruction not resolved in next 2-3 days.    Dr. Fierro covering until 1/24 Patient seen and examined, imaging reviewed.  She denies pain. No BM, unable to tell me about flatus.  Currently in A-fib with RVR.  Thin with muscle wasting.  Abd soft, mod distended, minimal TTP without guarding in LLQ/flank    hgb 11  Albumin 2.9    CT demonstrates upper rectal/rectosigmoid mass causing large bowel obstruction. Incompetent ileocecal valve with small bowel dilation. Possible RLL pneumonia. No obvious liver metastases.  Atrial fibrillation.    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  Malnutrition.  RLL pneumonia. Atrial fibrillation with RVR.  1. Metoprolol, control HR.  2. GI to evaluate for possible stent placement.  3. CT chest to evaluate for metastases.  Check CEA  4. Cautious IV hydration given elevated BNP  5. If unable to stent, will need diverting colostomy.  6. Needs medical and cardiac optimization.  Off Xarelto for ? a few days. Hold anticoagulation given imminent procedures.  7. TPN if obstruction not resolved in next 2-3 days.    Dr. Fierro covering until 1/24

## 2022-01-20 NOTE — ED PROVIDER NOTE - PHYSICAL EXAMINATION
Constitutional:  Well appearing, awake, alert, oriented to person, place, time/situation and in no apparent distress.  HEENT: Airway patent, Nasal mucosa clear. Mouth with normal mucosa. Throat has no vesicles, no oropharyngeal exudates and uvula is midline.  Eyes: Clear bilaterally, pupils equal, round and reactive to light.  Cardiac: Normal rate, regular rhythm.  Respiratory: Breath sounds clear and equal bilaterally.  GI: Abdomen distended and diffusely tender to palpation.  MSK: Spine appears normal, range of motion is not limited, no muscle or joint tenderness  Neuro: Alert and orientedx3 but occasional confused speech, strength 5/5 in all extremities, sensation intact throughout  Skin: Skin normal color for race, warm, dry and intact. No evidence of rash.
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
## 2022-01-21 ENCOUNTER — TRANSCRIPTION ENCOUNTER (OUTPATIENT)
Age: 87
End: 2022-01-21

## 2022-01-21 DIAGNOSIS — R45.1 RESTLESSNESS AND AGITATION: ICD-10-CM

## 2022-01-21 DIAGNOSIS — R53.81 OTHER MALAISE: ICD-10-CM

## 2022-01-21 DIAGNOSIS — R10.9 UNSPECIFIED ABDOMINAL PAIN: ICD-10-CM

## 2022-01-21 DIAGNOSIS — K62.89 OTHER SPECIFIED DISEASES OF ANUS AND RECTUM: ICD-10-CM

## 2022-01-21 DIAGNOSIS — Z51.5 ENCOUNTER FOR PALLIATIVE CARE: ICD-10-CM

## 2022-01-21 DIAGNOSIS — Z71.89 OTHER SPECIFIED COUNSELING: ICD-10-CM

## 2022-01-21 LAB
ALBUMIN SERPL ELPH-MCNC: 2.5 G/DL — LOW (ref 3.3–5)
ALP SERPL-CCNC: 48 U/L — SIGNIFICANT CHANGE UP (ref 40–120)
ALT FLD-CCNC: <5 U/L — LOW (ref 10–45)
ANION GAP SERPL CALC-SCNC: 11 MMOL/L — SIGNIFICANT CHANGE UP (ref 5–17)
ANION GAP SERPL CALC-SCNC: 17 MMOL/L — SIGNIFICANT CHANGE UP (ref 5–17)
ANION GAP SERPL CALC-SCNC: 17 MMOL/L — SIGNIFICANT CHANGE UP (ref 5–17)
APPEARANCE UR: CLEAR — SIGNIFICANT CHANGE UP
APTT BLD: 30.7 SEC — SIGNIFICANT CHANGE UP (ref 27.5–35.5)
AST SERPL-CCNC: 8 U/L — LOW (ref 10–40)
BACTERIA # UR AUTO: PRESENT /HPF
BASOPHILS # BLD AUTO: 0.02 K/UL — SIGNIFICANT CHANGE UP (ref 0–0.2)
BASOPHILS NFR BLD AUTO: 0.2 % — SIGNIFICANT CHANGE UP (ref 0–2)
BILIRUB SERPL-MCNC: 0.2 MG/DL — SIGNIFICANT CHANGE UP (ref 0.2–1.2)
BILIRUB UR-MCNC: ABNORMAL
BLD GP AB SCN SERPL QL: NEGATIVE — SIGNIFICANT CHANGE UP
BLD GP AB SCN SERPL QL: NEGATIVE — SIGNIFICANT CHANGE UP
BUN SERPL-MCNC: 18 MG/DL — SIGNIFICANT CHANGE UP (ref 7–23)
BUN SERPL-MCNC: 25 MG/DL — HIGH (ref 7–23)
BUN SERPL-MCNC: 27 MG/DL — HIGH (ref 7–23)
CALCIUM SERPL-MCNC: 7.1 MG/DL — LOW (ref 8.4–10.5)
CALCIUM SERPL-MCNC: 9.7 MG/DL — SIGNIFICANT CHANGE UP (ref 8.4–10.5)
CALCIUM SERPL-MCNC: 9.8 MG/DL — SIGNIFICANT CHANGE UP (ref 8.4–10.5)
CEA SERPL-MCNC: 2 NG/ML — SIGNIFICANT CHANGE UP (ref 0–3.8)
CHLORIDE SERPL-SCNC: 102 MMOL/L — SIGNIFICANT CHANGE UP (ref 96–108)
CHLORIDE SERPL-SCNC: 102 MMOL/L — SIGNIFICANT CHANGE UP (ref 96–108)
CHLORIDE SERPL-SCNC: 108 MMOL/L — SIGNIFICANT CHANGE UP (ref 96–108)
CO2 SERPL-SCNC: 21 MMOL/L — LOW (ref 22–31)
CO2 SERPL-SCNC: 27 MMOL/L — SIGNIFICANT CHANGE UP (ref 22–31)
CO2 SERPL-SCNC: 31 MMOL/L — SIGNIFICANT CHANGE UP (ref 22–31)
COLOR SPEC: YELLOW — SIGNIFICANT CHANGE UP
CREAT SERPL-MCNC: 0.54 MG/DL — SIGNIFICANT CHANGE UP (ref 0.5–1.3)
CREAT SERPL-MCNC: 0.65 MG/DL — SIGNIFICANT CHANGE UP (ref 0.5–1.3)
CREAT SERPL-MCNC: 0.7 MG/DL — SIGNIFICANT CHANGE UP (ref 0.5–1.3)
D DIMER BLD IA.RAPID-MCNC: 894 NG/ML DDU — HIGH
DIFF PNL FLD: NEGATIVE — SIGNIFICANT CHANGE UP
EOSINOPHIL # BLD AUTO: 0.01 K/UL — SIGNIFICANT CHANGE UP (ref 0–0.5)
EOSINOPHIL NFR BLD AUTO: 0.1 % — SIGNIFICANT CHANGE UP (ref 0–6)
EPI CELLS # UR: SIGNIFICANT CHANGE UP /HPF (ref 0–5)
GLUCOSE SERPL-MCNC: 100 MG/DL — HIGH (ref 70–99)
GLUCOSE SERPL-MCNC: 114 MG/DL — HIGH (ref 70–99)
GLUCOSE SERPL-MCNC: 119 MG/DL — HIGH (ref 70–99)
GLUCOSE UR QL: NEGATIVE — SIGNIFICANT CHANGE UP
HCT VFR BLD CALC: 27.6 % — LOW (ref 34.5–45)
HCT VFR BLD CALC: 30.6 % — LOW (ref 34.5–45)
HCT VFR BLD CALC: 31.4 % — LOW (ref 34.5–45)
HGB BLD-MCNC: 8 G/DL — LOW (ref 11.5–15.5)
HGB BLD-MCNC: 9.3 G/DL — LOW (ref 11.5–15.5)
HGB BLD-MCNC: 9.5 G/DL — LOW (ref 11.5–15.5)
HYALINE CASTS # UR AUTO: SIGNIFICANT CHANGE UP /LPF (ref 0–2)
IMM GRANULOCYTES NFR BLD AUTO: 0.4 % — SIGNIFICANT CHANGE UP (ref 0–1.5)
INR BLD: 0.97 — SIGNIFICANT CHANGE UP (ref 0.88–1.16)
KETONES UR-MCNC: 15 MG/DL
LACTATE SERPL-SCNC: 0.8 MMOL/L — SIGNIFICANT CHANGE UP (ref 0.5–2)
LEGIONELLA AG UR QL: NEGATIVE — SIGNIFICANT CHANGE UP
LEUKOCYTE ESTERASE UR-ACNC: NEGATIVE — SIGNIFICANT CHANGE UP
LYMPHOCYTES # BLD AUTO: 1.38 K/UL — SIGNIFICANT CHANGE UP (ref 1–3.3)
LYMPHOCYTES # BLD AUTO: 16.1 % — SIGNIFICANT CHANGE UP (ref 13–44)
MAGNESIUM SERPL-MCNC: 1.6 MG/DL — SIGNIFICANT CHANGE UP (ref 1.6–2.6)
MAGNESIUM SERPL-MCNC: 2 MG/DL — SIGNIFICANT CHANGE UP (ref 1.6–2.6)
MAGNESIUM SERPL-MCNC: 2.2 MG/DL — SIGNIFICANT CHANGE UP (ref 1.6–2.6)
MCHC RBC-ENTMCNC: 27.5 PG — SIGNIFICANT CHANGE UP (ref 27–34)
MCHC RBC-ENTMCNC: 27.6 PG — SIGNIFICANT CHANGE UP (ref 27–34)
MCHC RBC-ENTMCNC: 28.7 PG — SIGNIFICANT CHANGE UP (ref 27–34)
MCHC RBC-ENTMCNC: 29 GM/DL — LOW (ref 32–36)
MCHC RBC-ENTMCNC: 29.6 GM/DL — LOW (ref 32–36)
MCHC RBC-ENTMCNC: 31 GM/DL — LOW (ref 32–36)
MCV RBC AUTO: 92.4 FL — SIGNIFICANT CHANGE UP (ref 80–100)
MCV RBC AUTO: 93.2 FL — SIGNIFICANT CHANGE UP (ref 80–100)
MCV RBC AUTO: 94.8 FL — SIGNIFICANT CHANGE UP (ref 80–100)
MONOCYTES # BLD AUTO: 0.74 K/UL — SIGNIFICANT CHANGE UP (ref 0–0.9)
MONOCYTES NFR BLD AUTO: 8.6 % — SIGNIFICANT CHANGE UP (ref 2–14)
NEUTROPHILS # BLD AUTO: 6.38 K/UL — SIGNIFICANT CHANGE UP (ref 1.8–7.4)
NEUTROPHILS NFR BLD AUTO: 74.6 % — SIGNIFICANT CHANGE UP (ref 43–77)
NITRITE UR-MCNC: NEGATIVE — SIGNIFICANT CHANGE UP
NRBC # BLD: 0 /100 WBCS — SIGNIFICANT CHANGE UP (ref 0–0)
NT-PROBNP SERPL-SCNC: 911 PG/ML — HIGH (ref 0–300)
PH UR: 6 — SIGNIFICANT CHANGE UP (ref 5–8)
PHOSPHATE SERPL-MCNC: 2.1 MG/DL — LOW (ref 2.5–4.5)
PHOSPHATE SERPL-MCNC: >40.4 MG/DL — HIGH (ref 2.5–4.5)
PLATELET # BLD AUTO: 206 K/UL — SIGNIFICANT CHANGE UP (ref 150–400)
PLATELET # BLD AUTO: 248 K/UL — SIGNIFICANT CHANGE UP (ref 150–400)
PLATELET # BLD AUTO: 257 K/UL — SIGNIFICANT CHANGE UP (ref 150–400)
POTASSIUM SERPL-MCNC: 3.5 MMOL/L — SIGNIFICANT CHANGE UP (ref 3.5–5.3)
POTASSIUM SERPL-MCNC: 3.7 MMOL/L — SIGNIFICANT CHANGE UP (ref 3.5–5.3)
POTASSIUM SERPL-MCNC: SIGNIFICANT CHANGE UP (ref 3.5–5.3)
POTASSIUM SERPL-SCNC: 3.5 MMOL/L — SIGNIFICANT CHANGE UP (ref 3.5–5.3)
POTASSIUM SERPL-SCNC: 3.7 MMOL/L — SIGNIFICANT CHANGE UP (ref 3.5–5.3)
POTASSIUM SERPL-SCNC: SIGNIFICANT CHANGE UP (ref 3.5–5.3)
PROT SERPL-MCNC: 4.5 G/DL — LOW (ref 6–8.3)
PROT UR-MCNC: ABNORMAL MG/DL
PROTHROM AB SERPL-ACNC: 11.7 SEC — SIGNIFICANT CHANGE UP (ref 10.6–13.6)
RBC # BLD: 2.91 M/UL — LOW (ref 3.8–5.2)
RBC # BLD: 3.31 M/UL — LOW (ref 3.8–5.2)
RBC # BLD: 3.37 M/UL — LOW (ref 3.8–5.2)
RBC # FLD: 14.5 % — SIGNIFICANT CHANGE UP (ref 10.3–14.5)
RBC # FLD: 14.6 % — HIGH (ref 10.3–14.5)
RBC # FLD: 14.6 % — HIGH (ref 10.3–14.5)
RBC CASTS # UR COMP ASSIST: < 5 /HPF — SIGNIFICANT CHANGE UP
RH IG SCN BLD-IMP: POSITIVE — SIGNIFICANT CHANGE UP
RH IG SCN BLD-IMP: POSITIVE — SIGNIFICANT CHANGE UP
S PNEUM AG UR QL: NEGATIVE — SIGNIFICANT CHANGE UP
SODIUM SERPL-SCNC: 144 MMOL/L — SIGNIFICANT CHANGE UP (ref 135–145)
SODIUM SERPL-SCNC: 146 MMOL/L — HIGH (ref 135–145)
SODIUM SERPL-SCNC: 146 MMOL/L — HIGH (ref 135–145)
SP GR SPEC: 1.02 — SIGNIFICANT CHANGE UP (ref 1–1.03)
TROPONIN T SERPL-MCNC: 0.01 NG/ML — SIGNIFICANT CHANGE UP (ref 0–0.01)
UROBILINOGEN FLD QL: 1 E.U./DL — SIGNIFICANT CHANGE UP
WBC # BLD: 6.51 K/UL — SIGNIFICANT CHANGE UP (ref 3.8–10.5)
WBC # BLD: 8.56 K/UL — SIGNIFICANT CHANGE UP (ref 3.8–10.5)
WBC # BLD: 8.79 K/UL — SIGNIFICANT CHANGE UP (ref 3.8–10.5)
WBC # FLD AUTO: 6.51 K/UL — SIGNIFICANT CHANGE UP (ref 3.8–10.5)
WBC # FLD AUTO: 8.56 K/UL — SIGNIFICANT CHANGE UP (ref 3.8–10.5)
WBC # FLD AUTO: 8.79 K/UL — SIGNIFICANT CHANGE UP (ref 3.8–10.5)
WBC UR QL: < 5 /HPF — SIGNIFICANT CHANGE UP

## 2022-01-21 PROCEDURE — 99222 1ST HOSP IP/OBS MODERATE 55: CPT

## 2022-01-21 PROCEDURE — 93306 TTE W/DOPPLER COMPLETE: CPT | Mod: 26

## 2022-01-21 PROCEDURE — 99223 1ST HOSP IP/OBS HIGH 75: CPT

## 2022-01-21 PROCEDURE — 93010 ELECTROCARDIOGRAM REPORT: CPT | Mod: 76

## 2022-01-21 PROCEDURE — 99358 PROLONG SERVICE W/O CONTACT: CPT

## 2022-01-21 PROCEDURE — 99222 1ST HOSP IP/OBS MODERATE 55: CPT | Mod: 25

## 2022-01-21 PROCEDURE — 45347 SIGMOIDOSCOPY W/PLCMT STENT: CPT

## 2022-01-21 PROCEDURE — 99291 CRITICAL CARE FIRST HOUR: CPT

## 2022-01-21 DEVICE — HYDRA WIRE: Type: IMPLANTABLE DEVICE | Status: FUNCTIONAL

## 2022-01-21 DEVICE — IMPLANTABLE DEVICE: Type: IMPLANTABLE DEVICE | Status: FUNCTIONAL

## 2022-01-21 RX ORDER — AMIODARONE HYDROCHLORIDE 400 MG/1
1 TABLET ORAL
Qty: 900 | Refills: 0 | Status: DISCONTINUED | OUTPATIENT
Start: 2022-01-21 | End: 2022-01-22

## 2022-01-21 RX ORDER — AMIODARONE HYDROCHLORIDE 400 MG/1
150 TABLET ORAL ONCE
Refills: 0 | Status: COMPLETED | OUTPATIENT
Start: 2022-01-21 | End: 2022-01-21

## 2022-01-21 RX ORDER — LATANOPROST 0.05 MG/ML
1 SOLUTION/ DROPS OPHTHALMIC; TOPICAL AT BEDTIME
Refills: 0 | Status: DISCONTINUED | OUTPATIENT
Start: 2022-01-21 | End: 2022-02-04

## 2022-01-21 RX ORDER — HALOPERIDOL DECANOATE 100 MG/ML
0.5 INJECTION INTRAMUSCULAR ONCE
Refills: 0 | Status: COMPLETED | OUTPATIENT
Start: 2022-01-21 | End: 2022-01-21

## 2022-01-21 RX ORDER — METOPROLOL TARTRATE 50 MG
2.5 TABLET ORAL ONCE
Refills: 0 | Status: COMPLETED | OUTPATIENT
Start: 2022-01-21 | End: 2022-01-21

## 2022-01-21 RX ORDER — AZITHROMYCIN 500 MG/1
500 TABLET, FILM COATED ORAL DAILY
Refills: 0 | Status: DISCONTINUED | OUTPATIENT
Start: 2022-01-21 | End: 2022-01-24

## 2022-01-21 RX ORDER — OLANZAPINE 15 MG/1
2.5 TABLET, FILM COATED ORAL EVERY 6 HOURS
Refills: 0 | Status: DISCONTINUED | OUTPATIENT
Start: 2022-01-21 | End: 2022-01-27

## 2022-01-21 RX ORDER — AMIODARONE HYDROCHLORIDE 400 MG/1
0.5 TABLET ORAL
Qty: 900 | Refills: 0 | Status: DISCONTINUED | OUTPATIENT
Start: 2022-01-22 | End: 2022-01-22

## 2022-01-21 RX ORDER — METOPROLOL TARTRATE 50 MG
5 TABLET ORAL EVERY 6 HOURS
Refills: 0 | Status: DISCONTINUED | OUTPATIENT
Start: 2022-01-21 | End: 2022-01-21

## 2022-01-21 RX ORDER — DIAZEPAM 5 MG
0.5 TABLET ORAL ONCE
Refills: 0 | Status: DISCONTINUED | OUTPATIENT
Start: 2022-01-21 | End: 2022-01-21

## 2022-01-21 RX ORDER — CHLORHEXIDINE GLUCONATE 213 G/1000ML
1 SOLUTION TOPICAL
Refills: 0 | Status: DISCONTINUED | OUTPATIENT
Start: 2022-01-21 | End: 2022-02-04

## 2022-01-21 RX ORDER — CEFTRIAXONE 500 MG/1
1000 INJECTION, POWDER, FOR SOLUTION INTRAMUSCULAR; INTRAVENOUS EVERY 24 HOURS
Refills: 0 | Status: COMPLETED | OUTPATIENT
Start: 2022-01-21 | End: 2022-01-25

## 2022-01-21 RX ORDER — SODIUM CHLORIDE 9 MG/ML
500 INJECTION, SOLUTION INTRAVENOUS ONCE
Refills: 0 | Status: COMPLETED | OUTPATIENT
Start: 2022-01-21 | End: 2022-01-21

## 2022-01-21 RX ORDER — POTASSIUM PHOSPHATE, MONOBASIC POTASSIUM PHOSPHATE, DIBASIC 236; 224 MG/ML; MG/ML
30 INJECTION, SOLUTION INTRAVENOUS ONCE
Refills: 0 | Status: COMPLETED | OUTPATIENT
Start: 2022-01-21 | End: 2022-01-21

## 2022-01-21 RX ORDER — MAGNESIUM SULFATE 500 MG/ML
2 VIAL (ML) INJECTION ONCE
Refills: 0 | Status: COMPLETED | OUTPATIENT
Start: 2022-01-21 | End: 2022-01-21

## 2022-01-21 RX ORDER — POTASSIUM CHLORIDE 20 MEQ
10 PACKET (EA) ORAL
Refills: 0 | Status: COMPLETED | OUTPATIENT
Start: 2022-01-21 | End: 2022-01-21

## 2022-01-21 RX ORDER — ENOXAPARIN SODIUM 100 MG/ML
40 INJECTION SUBCUTANEOUS EVERY 24 HOURS
Refills: 0 | Status: DISCONTINUED | OUTPATIENT
Start: 2022-01-21 | End: 2022-01-21

## 2022-01-21 RX ADMIN — SODIUM CHLORIDE 75 MILLILITER(S): 9 INJECTION, SOLUTION INTRAVENOUS at 05:27

## 2022-01-21 RX ADMIN — Medication 0.5 MILLIGRAM(S): at 11:47

## 2022-01-21 RX ADMIN — LATANOPROST 1 DROP(S): 0.05 SOLUTION/ DROPS OPHTHALMIC; TOPICAL at 23:42

## 2022-01-21 RX ADMIN — Medication 2.5 MILLIGRAM(S): at 08:00

## 2022-01-21 RX ADMIN — SODIUM CHLORIDE 1000 MILLILITER(S): 9 INJECTION, SOLUTION INTRAVENOUS at 13:08

## 2022-01-21 RX ADMIN — SODIUM CHLORIDE 16.67 MILLILITER(S): 9 INJECTION, SOLUTION INTRAVENOUS at 08:32

## 2022-01-21 RX ADMIN — Medication 100 MILLIEQUIVALENT(S): at 02:37

## 2022-01-21 RX ADMIN — CEFTRIAXONE 100 MILLIGRAM(S): 500 INJECTION, POWDER, FOR SOLUTION INTRAMUSCULAR; INTRAVENOUS at 13:47

## 2022-01-21 RX ADMIN — Medication 2.5 MILLIGRAM(S): at 07:55

## 2022-01-21 RX ADMIN — HALOPERIDOL DECANOATE 0.5 MILLIGRAM(S): 100 INJECTION INTRAMUSCULAR at 20:56

## 2022-01-21 RX ADMIN — AMIODARONE HYDROCHLORIDE 200 MILLIGRAM(S): 400 TABLET ORAL at 14:44

## 2022-01-21 RX ADMIN — HEPARIN SODIUM 5000 UNIT(S): 5000 INJECTION INTRAVENOUS; SUBCUTANEOUS at 05:26

## 2022-01-21 RX ADMIN — Medication 100 MILLIEQUIVALENT(S): at 03:49

## 2022-01-21 RX ADMIN — Medication 100 MILLIEQUIVALENT(S): at 01:11

## 2022-01-21 RX ADMIN — Medication 5 MILLIGRAM(S): at 10:21

## 2022-01-21 RX ADMIN — AZITHROMYCIN 255 MILLIGRAM(S): 500 TABLET, FILM COATED ORAL at 01:39

## 2022-01-21 RX ADMIN — POTASSIUM PHOSPHATE, MONOBASIC POTASSIUM PHOSPHATE, DIBASIC 83.33 MILLIMOLE(S): 236; 224 INJECTION, SOLUTION INTRAVENOUS at 10:04

## 2022-01-21 RX ADMIN — Medication 25 GRAM(S): at 22:59

## 2022-01-21 RX ADMIN — SODIUM CHLORIDE 75 MILLILITER(S): 9 INJECTION, SOLUTION INTRAVENOUS at 21:19

## 2022-01-21 NOTE — CONSULT NOTE ADULT - ATTENDING COMMENTS
Initial attending contact date 1/21/22 . See fellow note written above for details. I reviewed the fellow documentation. I have personally seen and examined this patient. I reviewed vitals, labs, medications, cardiac studies, and additional imaging. I agree with the above fellow's findings and plans as written above with the following additions/statements.    86 yo female with Pmhx of Afib (on Xarelto) with SN dysfunction s/p PPM, dementia presents with failure to thrive with weakness and poor PO intake found to have large bowel obstruction found to be in Afib with RVR. Cardiology consulted for pre-op cardiac risk assessment  -On tele this am, pt noted to be in A fib with -160 with SBP 90  -Started on amio bolus but at slower rate to avoid hypotension  -On review of tele this afternoon, pt noted to be in NSR.   -Cont amio drip @ 1 mg/min for first 6 hours, then continue with maintenance @ .5 mg/min   -Prelim ECHO with hyperdynamic LVEF, LVH.  -Aggressive IVF  -Now that patient is in NSR, pt planned for endoscopic stenting. Pt considered intermediate risk for low-intermediate risk procedure  -AC when cleared by primary team. CHADSVASC2  = 3, no need for bridging with IV heparin while holding AC  -Will fu post op
Seen/discussed with fellow on 1/21  Reviewed imaging personally  Agree with above
88 yo F w/ dementia, tachy-yudith syndrome s/p PPM, AF initially admitted to surgical tele for FTT in setting of rectal obstruction (mass vs. infection/inflammation) with plan for colonoscopy later today. Patient developed AF w/ RVR and soft BP, started on amio bolus and infusion with improvements in rates, but given recurrent rapid AF and borderline BP transferred to SICU. Spoke with daughter at bedside who understands her mother's decline may not be reversible with resolution of obstruction, would like to see how the procedure goes before making any further decisions. NPO for now, will need nutrition, possibly TPN if unable to place NGT and advance diet. Goal HR <120, MAP > 65. Holding A/C in preparation for procedure. Currently on cef/azithro for CAP coverage, ? aspiration.
Complex medical decision making / symptom management in the setting of advanced illness and possible malignancy.    86yo F with PMH of Dementia and AFib p/w weakness and found to have large bowel obstruction. Raises concern for malignancy but currently no evidence of metastases. Pending endoscopic intervention, further surgical planning after. Emotional support provided to patient and family. Comprehensive symptom assessment as noted above: recommend IV Tylenol PRNs for incidental abdominal discomfort and Zyprexa 2.5mg IM PRN for delirium; can transition to PO if stenting is successful.    Will continue to follow for ongoing GOC discussion. Emotional support provided, questions answered.  Active Psychosocial Referrals:  [x]Social Work/Case management []PT/OT []Chaplaincy []Hospice  []Patient/Family Support []Holistic RN []Massage Therapy []Ethics  Coping: [x] well [] with difficulty [] poor coping [] unable to assess  Support system: [] strong [x] adequate [] inadequate    For new or uncontrolled symptoms, please call Palliative Care at [phone]. The service is available 24/7 (including nights & weekends) to provide symptom management recommendations over the phone as appropriate

## 2022-01-21 NOTE — PROGRESS NOTE ADULT - SUBJECTIVE AND OBJECTIVE BOX
I had a discussion with her daughter about the plan to decompress with stent and subsequent expected course to improve cardiopulmonary and nutritional status.  I discussed the option of colostomy to decompress as well, but she would prefer a stent.

## 2022-01-21 NOTE — CONSULT NOTE ADULT - TIME BILLING
as noted above
Emotional Support/Supportive Care given acute hospitalization. Exploration of GOC related to the interventions. Clarifying Potential Disease Trajectories if mass is confirmed malignancy.

## 2022-01-21 NOTE — CONSULT NOTE ADULT - PROBLEM SELECTOR RECOMMENDATION 6
- patient remains full code  - Proceed with rectal stent placement  - To pursue surgery if indicated  - Blanca believes her mother would not want chemotherapy or radiation

## 2022-01-21 NOTE — CONSULT NOTE ADULT - ASSESSMENT
86yo female with afib (on Xarelto) dementia presents with weakness and poor PO intake. Exam significant for distention, CT with evidence of large bowel obstruction, narrowing of the rectum 9-12cm from the anal verge, differential includes malignancy vs infectious/inflammatory.     # Rectal stricture  mass vs inflammation  - case d/w Dr Pérez  - plan for Flex sig with biopsies and stent placement later today  - Enema x 2 now and one before coming for the procedure  - Keep NPO  - f/u cards recs regarding afib  - continue to hold AC for the procedure    Recommendations discussed with primary team  Plan discussed with GI service attending    Grant Kenney MD  PGY-5 GI fellow  Pager: [phone]

## 2022-01-21 NOTE — CONSULT NOTE ADULT - PROBLEM SELECTOR RECOMMENDATION 5
- Patient remains full code, with trial of intubation, trial of CPR, and no long term mechanical ventilation  - Blanca would want patient to go to Rehab  - If patient fails treatment or therapy, Blanca would want patient to return home with hospice

## 2022-01-21 NOTE — CONSULT NOTE ADULT - CONVERSATION DETAILS
Conversation held with daughter and HCP at bedside (HCP form in Alpha tab from 2018). She stated she understands mass may be cancerous. She would want her mother to receive the stent which she believes will help her mother eat and drink and relieve the pressure in her abdomen improving her pain. On discharge she thinks her mother would benefit from rehab in making her strong enough to withstand surgery should she need it. She does not want her mother to be on chemotherapy. She is aware the cancer may progress within this time. She is agreeable to mechanical ventilation and CPR along with ICU stay if indicated. She is not amenable to long term mechanical ventilation. If no surgery or further treatment could be offered to patient, she would want to pursue hospice for her mother at home. She was made aware that hospice would require a primary caregiver to be present with patient, which patient does not have. Emotional support was provided. All questions were answered.

## 2022-01-21 NOTE — CHART NOTE - NSCHARTNOTEFT_GEN_A_CORE
S: Patient evaluated, c/o some abdominal pain mildly Improved from earlier. No nausea, vomiting, chest pain or dyspnea    O: Afebrile, Tachy to 108, SBP   Abdomen remains distended, tender to palpation LLQ>R, no rebound or guarding    A/P:  - will continue to monitor
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
## 2022-01-21 NOTE — CONSULT NOTE ADULT - ASSESSMENT
88 yo female with Pmhx of Afib (on Xarelto) with SN dysfunction s/p PPM, dementia presents with failure to thrive with weakness and poor PO intake found to have a large bowel obstruction, narrowing of the rectum 9-12cm from the anal verge, with plan for Flex/SIg +/- Stenting with course notable for Afib with RVR. Cardiology consulted for cardiac optimization    1) Atrial Fibrillation with RVR in the setting of large bowel obstruction  -Patient with known Hx of Afib on Xarelto, however with active Eliquis prescription as well, here with large bowel obstruction  -Pt admitted with Afib RVR with -160 with SBP 90's , s/p multiple pushes of IV lopressor by primary team  - Patient received Amio bolus over 30 mns to avoid iatrogenic hypotension, with conversion to NSR later in the day  -At this time recommend Cont Amio drip @ 1 mg/min for first 6 hours, then continue with maintenance @ .5 mg/min for 18 hours   - Eccho shwoing preserved BIV function without valvular pathology  -Ptt planned for endoscopic stenting. SHe is considered intermediate risk for low-intermediate risk procedure  -AC when cleared by primary team. CHADSVASC2  = 3, no need for bridging with IV heparin while holding AC  -Palliative consulted for further GOC disucssion  -Cardiology will continue to follow post op. Please call with any questions

## 2022-01-21 NOTE — CONSULT NOTE ADULT - ASSESSMENT
86yo female PMH of dementia, Afib (Xarelto, Last taken Tuesday) PPM (Indicated for tachy-yudith syndrome), cerebral LICA paraclinoid aneurysm (s/p stenting). Admitted for abdominal pain and 5 days of poor to none oral intake. CT scan done during this admission shows large bowel obstruction, narrowing of the rectum 9-12cm from the anal verge. Impression is large bowel obstruction, differential includes malignancy vs infectious/inflammatory. Today went to Afib with RVR, home betablocker not restarted. Required multiple pushes of betablocker IV, calcium channel blocker and amiodarone.  Asymptomatic and hemodynamically stable, no need of pressors. Abdomen non-peritonitic.   Hemoglobin decreased 2 units. Possible  GI bleed from mass vs chronic disease.     Going for flexible sigmoidoscopy and endoscopic stenting, He will remain in ICU for monitoring post-procedure.     NEURO:  None  HEENT: latanoprost   CV: goal MAP >65. Amiodarone gtt. Holding Xarelto (C2V 3-4).   PULM: Goran>94%  GI/FEN: NPO, LR@75. Sigmoidoscopy with stent placement  : Reyes   ENDO: ISS  HEME: Holding Xarelto  ID: Ceftriaxone, Azithromycin to cover for CAP (1/21- )  PPx: SCD/SQH   LINES: PIV   WOUNDS/DRAINS: None   PT/OT: Not ordered

## 2022-01-21 NOTE — PROGRESS NOTE ADULT - ASSESSMENT
88yo female with afib (on Xarelto) dementia presents with weakness and poor PO intake. Afebrile, tachycardic, normotensive. Exam significant for distention, with LLQ tenderness, no rebound or guarding. Lungs coarse breath sounds with cough. Labs demonstrating normal WBC and LA, elevated BNP, CT with evidence of large bowel obstruction, narrowing of the rectum 9-12cm from the anal verge. Impression is large bowel obstruction, differential includes malignancy vs infectious/inflammatory.     - NPO/IVF  - Abx: Ceftriaxone, Azithromycin to cover for CAP (1/21- )  - SCD/SQH  - IS/OOB  - Holding Xarelto (C2V 3-4), metoprolol IV conversion  - Staging CT chest  - TTE/EKG  - GI c/s  - Cards c/s  - EP c/s

## 2022-01-21 NOTE — CONSULT NOTE ADULT - ASSESSMENT
86 yo F PMH Dementia, Afib who presented with weakness, abdominal pain, and decreased appetite. Palliative Medicine consulted for complex decision making and to determin overall goals of care in setting of rectal mass.

## 2022-01-21 NOTE — CONSULT NOTE ADULT - SUBJECTIVE AND OBJECTIVE BOX
88 yo female with Pmhx of Afib (on Xarelto), dementia presents with failure to thrive with weakness and poor PO intake found to have alarge bowel obstruction, narrowing of the rectum 9-12cm from the anal verge, with course notable for Afib with RVR. Cardiology consulted for cardiac optimization      PAST MEDICAL & SURGICAL HISTORY:  Glaucoma  Osteoarthritis  Syncope and collapse  PVC (premature ventricular contraction)  Cerebral aneurysm  History of loop recorder  History of cholecystectomy    Home Medications:  XARELTO 20MG TABLETS: TAKE 1 TABLET BY MOUTH DAILY (2022 22:01)      MEDICATIONS  (STANDING):  azithromycin  IVPB 500 milliGRAM(s) IV Intermittent daily  cefTRIAXone   IVPB 1000 milliGRAM(s) IV Intermittent every 24 hours  lactated ringers. 1000 milliLiter(s) (75 mL/Hr) IV Continuous <Continuous>  metoprolol tartrate Injectable 5 milliGRAM(s) IV Push every 6 hours    MEDICATIONS  (PRN):      .  VITAL SIGNS:  T(C): 36.9 (22 @ 04:53), Max: 37.2 (22 @ 12:43)  T(F): 98.4 (22 @ 04:53), Max: 98.9 (22 @ 12:43)  HR: 110 (22 @ 08:10) (95 - 150)  BP: 94/61 (22 @ 08:10) (88/51 - 147/77)  BP(mean): 73 (22 @ 08:10) (64 - 96)  RR: 18 (22 @ 08:10) (18 - 18)  SpO2: 95% (22 @ 08:10) (91% - 99%)  Wt(kg): --    PHYSICAL EXAM: INCOMPLETE    Constitutional: WDWN resting comfortably in bed; NAD  Head: NC/AT  Eyes: PERRL, EOMI, anicteric sclera  ENT: no nasal discharge; uvula midline, no oropharyngeal erythema or exudates; MMM  Neck: supple; no JVD or thyromegaly  Respiratory: CTA B/L; no W/R/R, no retractions  Cardiac: +S1/S2; RRR; no M/R/G; PMI non-displaced  Gastrointestinal: soft, NT/ND; no rebound or guarding; +BSx4  Genitourinary: normal external genitalia  Back: spine midline, no bony tenderness or step-offs; no CVAT B/L  Extremities: WWP, no clubbing or cyanosis; no peripheral edema  Musculoskeletal: NROM x4; no joint swelling, tenderness or erythema  Vascular: 2+ radial, femoral, DP/PT pulses B/L  Dermatologic: skin warm, dry and intact; no rashes, wounds, or scars  Lymphatic: no submandibular or cervical LAD  Neurologic: AAOx3; CNII-XII grossly intact; no focal deficits  Psychiatric: affect and characteristics of appearance, verbalizations, behaviors are appropriate    .  LABS:                         9.3    8.56  )-----------( 248      ( 2022 08:08 )             31.4         144  |  102  |  25<H>  ----------------------------<  100<H>  3.7   |  31  |  0.65    Ca    9.8      2022 08:08  Phos  2.1       Mg     2.0         TPro  6.6  /  Alb  2.9<L>  /  TBili  0.9  /  DBili  x   /  AST  14<L>  /  ALT  6<L>  /  AlkPhos  67      PT/INR - ( 2022 08:08 )   PT: 11.7 sec;   INR: 0.97          PTT - ( 2022 08:08 )  PTT:30.7 sec  Urinalysis Basic - ( 2022 23:48 )    Color: Yellow / Appearance: Clear / S.020 / pH: x  Gluc: x / Ketone: 15 mg/dL  / Bili: Moderate / Urobili: 1.0 E.U./dL   Blood: x / Protein: Trace mg/dL / Nitrite: NEGATIVE   Leuk Esterase: NEGATIVE / RBC: < 5 /HPF / WBC < 5 /HPF   Sq Epi: x / Non Sq Epi: 0-5 /HPF / Bacteria: Present /HPF          RADIOLOGY, EKG & ADDITIONAL TESTS: Reviewed.     < from: CT Abdomen and Pelvis w/ IV Cont (22 @ 15:45) >  IMPRESSION:  1.  Suspect obstructing annular high rectal mass.  2.  Multiple groundglass airspace opacities in the visualized right lower   and middle lobes with small dense consolidation in the right lower lobe,   possibly infectious or inflammatory/aspiration.  3.  Prominent fluid filled bladder. Correlate clinically with retention.  4.  Severe L2 vertebral body compression deformity and retropulsed   posterior fragment moderately narrowing the central canal at this level.    < end of copied text >       88 yo female with Pmhx of Afib (on Xarelto) with SN dysfunction s/p PPM, dementia presents with failure to thrive with weakness and poor PO intake found to have alarge bowel obstruction, narrowing of the rectum 9-12cm from the anal verge, with course notable for Afib with RVR. Cardiology consulted for cardiac optimization      PAST MEDICAL & SURGICAL HISTORY:  Glaucoma  Osteoarthritis  Syncope and collapse  PVC (premature ventricular contraction)  Cerebral aneurysm  History of loop recorder  History of cholecystectomy    Home Medications:  XARELTO 20MG TABLETS: TAKE 1 TABLET BY MOUTH DAILY (2022 22:01)      MEDICATIONS  (STANDING):  azithromycin  IVPB 500 milliGRAM(s) IV Intermittent daily  cefTRIAXone   IVPB 1000 milliGRAM(s) IV Intermittent every 24 hours  lactated ringers. 1000 milliLiter(s) (75 mL/Hr) IV Continuous <Continuous>  metoprolol tartrate Injectable 5 milliGRAM(s) IV Push every 6 hours    MEDICATIONS  (PRN):      .  VITAL SIGNS:  T(C): 36.9 (22 @ 04:53), Max: 37.2 (22 @ 12:43)  T(F): 98.4 (22 @ 04:53), Max: 98.9 (22 @ 12:43)  HR: 110 (22 @ 08:10) (95 - 150)  BP: 94/61 (22 @ 08:10) (88/51 - 147/77)  BP(mean): 73 (22 @ 08:10) (64 - 96)  RR: 18 (22 @ 08:10) (18 - 18)  SpO2: 95% (22 @ 08:10) (91% - 99%)  Wt(kg): --    PHYSICAL EXAM: INCOMPLETE    Constitutional: WDWN resting comfortably in bed; NAD  Head: NC/AT  Eyes: PERRL, EOMI, anicteric sclera  ENT: no nasal discharge; uvula midline, no oropharyngeal erythema or exudates; MMM  Neck: supple; no JVD or thyromegaly  Respiratory: CTA B/L; no W/R/R, no retractions  Cardiac: +S1/S2; RRR; no M/R/G; PMI non-displaced  Gastrointestinal: soft, NT/ND; no rebound or guarding; +BSx4  Genitourinary: normal external genitalia  Back: spine midline, no bony tenderness or step-offs; no CVAT B/L  Extremities: WWP, no clubbing or cyanosis; no peripheral edema  Musculoskeletal: NROM x4; no joint swelling, tenderness or erythema  Vascular: 2+ radial, femoral, DP/PT pulses B/L  Dermatologic: skin warm, dry and intact; no rashes, wounds, or scars  Lymphatic: no submandibular or cervical LAD  Neurologic: AAOx3; CNII-XII grossly intact; no focal deficits  Psychiatric: affect and characteristics of appearance, verbalizations, behaviors are appropriate    .  LABS:                         9.3    8.56  )-----------( 248      ( 2022 08:08 )             31.4         144  |  102  |  25<H>  ----------------------------<  100<H>  3.7   |  31  |  0.65    Ca    9.8      2022 08:08  Phos  2.1       Mg     2.0         TPro  6.6  /  Alb  2.9<L>  /  TBili  0.9  /  DBili  x   /  AST  14<L>  /  ALT  6<L>  /  AlkPhos  67  -20    PT/INR - ( 2022 08:08 )   PT: 11.7 sec;   INR: 0.97          PTT - ( 2022 08:08 )  PTT:30.7 sec  Urinalysis Basic - ( 2022 23:48 )    Color: Yellow / Appearance: Clear / S.020 / pH: x  Gluc: x / Ketone: 15 mg/dL  / Bili: Moderate / Urobili: 1.0 E.U./dL   Blood: x / Protein: Trace mg/dL / Nitrite: NEGATIVE   Leuk Esterase: NEGATIVE / RBC: < 5 /HPF / WBC < 5 /HPF   Sq Epi: x / Non Sq Epi: 0-5 /HPF / Bacteria: Present /HPF          RADIOLOGY, EKG & ADDITIONAL TESTS: Reviewed.     < from: CT Abdomen and Pelvis w/ IV Cont (22 @ 15:45) >  IMPRESSION:  1.  Suspect obstructing annular high rectal mass.  2.  Multiple groundglass airspace opacities in the visualized right lower   and middle lobes with small dense consolidation in the right lower lobe,   possibly infectious or inflammatory/aspiration.  3.  Prominent fluid filled bladder. Correlate clinically with retention.  4.  Severe L2 vertebral body compression deformity and retropulsed   posterior fragment moderately narrowing the central canal at this level.    < end of copied text >       88 yo female with Pmhx of Afib (on Xarelto) with SN dysfunction s/p PPM, dementia presents with failure to thrive with weakness and poor PO intake found to have alarge bowel obstruction, narrowing of the rectum 9-12cm from the anal verge, with course notable for Afib with RVR. Cardiology consulted for cardiac optimization    Patient disoriented and poor historian unable to report how she got to the hospital or how she takes her meds. per primary team patient is given meds by her daughter who recently travelled and prepared daily meals for her. Upon return a week later her mother had not eaten anything or taken her meds.   Tele reviewed with patient in afib with rates in the 160's with Borderline BP;s.      PAST MEDICAL & SURGICAL HISTORY:  Glaucoma  Osteoarthritis  Syncope and collapse  PVC (premature ventricular contraction)  Cerebral aneurysm  History of loop recorder  History of cholecystectomy    Home Medications:  XARELTO 20MG TABLETS: TAKE 1 TABLET BY MOUTH DAILY (2022 22:01)      MEDICATIONS  (STANDING):  azithromycin  IVPB 500 milliGRAM(s) IV Intermittent daily  cefTRIAXone   IVPB 1000 milliGRAM(s) IV Intermittent every 24 hours  lactated ringers. 1000 milliLiter(s) (75 mL/Hr) IV Continuous <Continuous>  metoprolol tartrate Injectable 5 milliGRAM(s) IV Push every 6 hours    MEDICATIONS  (PRN):      .  VITAL SIGNS:  T(C): 36.9 (22 @ 04:53), Max: 37.2 (22 @ 12:43)  T(F): 98.4 (22 @ 04:53), Max: 98.9 (22 @ 12:43)  HR: 110 (22 @ 08:10) (95 - 150)  BP: 94/61 (22 @ 08:10) (88/51 - 147/77)  BP(mean): 73 (22 @ 08:10) (64 - 96)  RR: 18 (22 @ 08:10) (18 - 18)  SpO2: 95% (22 @ 08:10) (91% - 99%)  Wt(kg): --    PHYSICAL EXAM:     Constitutional: Frail in NAD  Head: NC/AT  ENT:Dry MM  Neck: supple; no JVD   Respiratory: CTA B/L; no W/R/R, n  Cardiac: +S1/S2; RRR; no M/R/G  Gastrointestinal: soft, NT/ND; no rebound or guarding; +BS  Extremities: WWP, no clubbing or cyanosis; no peripheral edema  Vascular: 2+ radial, DP/PT pulses B/L  Neurologic: AAOx3; CNII-XII grossly intact; no focal deficits      .  LABS:                         9.3    8.56  )-----------( 248      ( 2022 08:08 )             31.4         144  |  102  |  25<H>  ----------------------------<  100<H>  3.7   |  31  |  0.65    Ca    9.8      2022 08:08  Phos  2.1       Mg     2.0         TPro  6.6  /  Alb  2.9<L>  /  TBili  0.9  /  DBili  x   /  AST  14<L>  /  ALT  6<L>  /  AlkPhos  67  20    PT/INR - ( 2022 08:08 )   PT: 11.7 sec;   INR: 0.97          PTT - ( 2022 08:08 )  PTT:30.7 sec  Urinalysis Basic - ( 2022 23:48 )    Color: Yellow / Appearance: Clear / S.020 / pH: x  Gluc: x / Ketone: 15 mg/dL  / Bili: Moderate / Urobili: 1.0 E.U./dL   Blood: x / Protein: Trace mg/dL / Nitrite: NEGATIVE   Leuk Esterase: NEGATIVE / RBC: < 5 /HPF / WBC < 5 /HPF   Sq Epi: x / Non Sq Epi: 0-5 /HPF / Bacteria: Present /HPF          RADIOLOGY, EKG & ADDITIONAL TESTS: Reviewed.     < from: CT Abdomen and Pelvis w/ IV Cont (22 @ 15:45) >  IMPRESSION:  1.  Suspect obstructing annular high rectal mass.  2.  Multiple groundglass airspace opacities in the visualized right lower   and middle lobes with small dense consolidation in the right lower lobe,   possibly infectious or inflammatory/aspiration.  3.  Prominent fluid filled bladder. Correlate clinically with retention.  4.  Severe L2 vertebral body compression deformity and retropulsed   posterior fragment moderately narrowing the central canal at this level.    < end of copied text >    < from: TTE Echo Complete w/o Contrast w/ Doppler (22 @ 12:01) >  1. Patient was tachycardicduring the study.   2. The left ventricle is normal in size and low normal systolic function   with a calculated ejection fraction of 55%.   3. Normal right ventricular size and systolic function.   4. Aortic sclerosis without significant stenosis.  5. No evidence of pulmonary hypertension, pulmonary artery systolic   pressure is 16 mmHg.   6. Trivial pericardial effusion.      < end of copied text >

## 2022-01-21 NOTE — CHART NOTE - NSCHARTNOTEFT_GEN_A_CORE
Pt seen and examined at bedside after echocardiogram. She had episode of rapid afib 150s on return from echocardiogram with SBP 90-100s. Cardiology at bedside and recommend LR 500cc bolus and amiodarone bolus. On my exam, pt HR 100s, BP 95-100S. She is AAOX2, abdomen soft, increased moderate distention, moderate LLQ tenderness, no rebound/guarding. NGT attempted to be placed multiple times by myself, chief resident and lara residents however patient refuses and is unable to tolerate placement at this time after multiple attempts. Plan for patient to be moved to SICU for further monitoring, and GI for endoscopic stent placement this afternoon. Plan discussed with Dr. Pérez and daughter who were both also at bedside.

## 2022-01-21 NOTE — PROGRESS NOTE ADULT - SUBJECTIVE AND OBJECTIVE BOX
GENERAL SURGERY PROGRESS NOTE    SUBJECTIVE: Patient seen and examined bedside. Admitted overnight. This morning, patient reports some mild abdominal pain. No n/v, johnson draining clear yellow urine.     azithromycin  IVPB 500 milliGRAM(s) IV Intermittent daily  cefTRIAXone   IVPB 1000 milliGRAM(s) IV Intermittent every 24 hours  metoprolol tartrate Injectable 5 milliGRAM(s) IV Push every 6 hours      Vital Signs Last 24 Hrs  T(C): 36.9 (2022 04:53), Max: 37.2 (2022 12:43)  T(F): 98.4 (2022 04:53), Max: 98.9 (2022 12:43)  HR: 106 (2022 05:30) (95 - 108)  BP: 98/53 (2022 05:30) (97/51 - 147/77)  BP(mean): 73 (2022 05:30) (70 - 96)  RR: 18 (2022 05:30) (18 - 18)  SpO2: 98% (2022 05:30) (95% - 99%)  I&O's Detail    2022 07:01  -  2022 07:00  --------------------------------------------------------  IN:    IV PiggyBack: 550 mL    Lactated Ringers: 675 mL  Total IN: 1225 mL    OUT:    Indwelling Catheter - Urethral (mL): 750 mL  Total OUT: 750 mL    Total NET: 475 mL          PHYSICAL EXAM    General: NAD, resting comfortably in bed  C/V: NSR  Pulm: Nonlabored breathing, no respiratory distress on room air  Abd: soft, soft-moderate distension, mild LLQ TTP, no rebound tenderness, no guarding  Extrem: WWP, no edema, SCDs in place        LABS:                        11.4   8.38  )-----------( 269      ( 2022 14:10 )             37.0     -20    146<H>  |  102  |  27<H>  ----------------------------<  114<H>  3.5   |  27  |  0.70    Ca    9.7      2022 23:48  Mg     2.2         TPro  6.6  /  Alb  2.9<L>  /  TBili  0.9  /  DBili  x   /  AST  14<L>  /  ALT  6<L>  /  AlkPhos  67  -    PT/INR - ( 2022 14:10 )   PT: 13.3 sec;   INR: 1.11          PTT - ( 2022 14:10 )  PTT:27.6 sec  Urinalysis Basic - ( 2022 23:48 )    Color: Yellow / Appearance: Clear / S.020 / pH: x  Gluc: x / Ketone: 15 mg/dL  / Bili: Moderate / Urobili: 1.0 E.U./dL   Blood: x / Protein: Trace mg/dL / Nitrite: NEGATIVE   Leuk Esterase: NEGATIVE / RBC: < 5 /HPF / WBC < 5 /HPF   Sq Epi: x / Non Sq Epi: 0-5 /HPF / Bacteria: Present /HPF        RADIOLOGY & ADDITIONAL STUDIES:

## 2022-01-21 NOTE — CONSULT NOTE ADULT - SUBJECTIVE AND OBJECTIVE BOX
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 of the rectum 9-12cm from the anal verge. Impression is large bowel obstruction, differential includes malignancy vs infectious/inflammatory. Today patient became tachycardic to the 150s with drop of her systolic BP to the 80s, MAPs 64. Patient was asymptomatic. At that point metoprolol 2.5 twice and 5 once, diltiazem once. Patient has been going in and out of Afib. Primary team consulted cardiology who recommended amiodarone bolus and infusion to avoid hypotension. Echocardiogram shows preserved BIV function without valvular pathology. Additionally, team consulted gastroenterology who plan to take patient to flexible sigmoidoscopy and endoscopic stenting, Patient consider by cardiology to be intermediate risk for low-intermediate risk procedure.   Primary team attempted to place NG tube which patient refused and cause her to be very altered.   Holding anticoagulation per primary team for interventions. Per cardiology, patient is CHADSVASC2  = 3, and there is no need for bridging with IV heparin while holding AC.     When seen patient had a HR in the 90s, SBP of 90s-100s and MAPs >65s. Patient was calm. Denies having headache, lightheadedness chest pain or sob. Abdominal pain was mild, no nausea at the moment. Patient is only oriented in person. Daughter at bedside, she is the HCP and understands patients disease and future plans for treatment. She wants her mother to be full code.       PAST MEDICAL & SURGICAL HISTORY:  Glaucoma  Osteoarthritis  Syncope and collapse  PVC (premature ventricular contraction)  Cerebral aneurysm    PAST SURGICAL HISTORY:   History of loop recorder  History of cholecystectomy    REVIEW OF SYSTEMS:   Pertinent positives/negatives noted in HPI.     HOME MEDICATIONS:  Home Medications:  XARELTO 20MG TABLETS: TAKE 1 TABLET BY MOUTH DAILY (2022 22:01)      ALLERGIES:  No Known Allergies    PHYSICAL EXAM:   Vital Signs Last 24 Hrs  T(C): 36.7 (2022 14:23), Max: 36.9 (2022 04:53)  T(F): 98.1 (2022 14:23), Max: 98.4 (2022 04:53)  HR: 120 (2022 12:35) (95 - 150)  BP: 101/57 (2022 12:35) (88/51 - 147/77)  BP(mean): 72 (2022 12:35) (64 - 96)  RR: 16 (2022 12:35) (16 - 18)  SpO2: 95% (2022 12:35) (91% - 99%)    PHYSICAL EXAM:  T(C): 36.7 (22 @ 14:23), Max: 36.9 (22 @ 04:53)  HR: 120 (22 @ 12:35) (95 - 150)  BP: 101/57 (22 @ 12:35) (88/51 - 147/77)  RR: 16 (22 @ 12:35) (16 - 18)  SpO2: 95% (22 @ 12:35) (91% - 99%)    GENERAL: NAD, Resting comfortably in bed, awake, opens eyes spontaneously. Low weight, bones are visible.   HEENT: NCAT, MMM, Normal conjunctiva, PERRL  RESP: Nonlabored breathing, No respiratory distress. Decrease breath sounds at bases. Mild crackles.   CARD: Normal rate, Normal peripheral perfusion. No murmurs  GI: Distended, mildly tender to palpation , No guarding, No rebound tenderness  EXTREM: WWP, No edema, No gross deformity of extremities.   SKIN: dry skin  NEURO: AAOx1, No focal motor or sensory deficits    US at bedside:   Chest: some some b-line. no pleural effusion  Lower extremities: femoral vein of the left leg possible decrease compression. No evidence of thrombus in femoral or popliteal veins.     LABS:                        9.3    8.56  )-----------( 248      ( 2022 08:08 )             31.4         144  |  102  |  25<H>  ----------------------------<  100<H>  3.7   |  31  |  0.65    Ca    9.8      2022 08:08  Phos  2.1       Mg     2.0         TPro  6.6  /  Alb  2.9<L>  /  TBili  0.9  /  DBili  x   /  AST  14<L>  /  ALT  6<L>  /  AlkPhos  67  01-20    PT/INR - ( 2022 08:08 )   PT: 11.7 sec;   INR: 0.97          PTT - ( 2022 08:08 )  PTT:30.7 sec  Urinalysis Basic - ( 2022 23:48 )    Color: Yellow / Appearance: Clear / S.020 / pH: x  Gluc: x / Ketone: 15 mg/dL  / Bili: Moderate / Urobili: 1.0 E.U./dL   Blood: x / Protein: Trace mg/dL / Nitrite: NEGATIVE   Leuk Esterase: NEGATIVE / RBC: < 5 /HPF / WBC < 5 /HPF   Sq Epi: x / Non Sq Epi: 0-5 /HPF / Bacteria: Present /HPF    --------------------------------------------------------------------------------  CPT: ECHO TTE WO CON COMP W DOPP - 46157; - 2009211.  Indication(s): R57.9 - Shock, unspecified  Procedure: A complete two-dimensional transthoracic echocardiogram was  performed: 2D, M-mode, spectral and color flow Doppler.  Ordering Location: [facility]        --------------------------------------------------------------------------------  CONCLUSIONS:    1. Patient was tachycardic during the study.  2. The left ventricle is normal in size and low normal systolic function with a calculated ejection fraction of 55%.  3. Normal right ventricular size and systolic function.  4. Aortic sclerosis without significant stenosis.  5. No evidence of pulmonary hypertension, pulmonary artery systolic pressure is 16 mmHg.  6. Trivial pericardial effusion.      INTERPRETATION: Portable Chest X-ray:    History: Large bowel obstruction, preoperative evaluation.    Prior study: Chest radiograph from 2022.    Findings: Slight right basilar infiltrate again noted. There are no pleural effusions. The cardiomediastinal silhouette, bones and soft tissues are unremarkable.    Impression: Slight right basilar infiltrate.      INTERPRETATION: CT ABDOMEN AND PELVIS WITH IV CONTRAST    CLINICAL INFORMATION: Abdominal pain.    COMPARISON: CT chest 2018.    PROCEDURE:  CT of the Abdomen and Pelvis was performed with intravenous contrast.  Intravenous contrast: 96 cc Omnipaque 350.  Oral contrast: None.  Sagittal and coronal reformats were performed.    FINDINGS:  LOWER CHEST: Multiple groundglass densities with peripheral predominance in the right middle and lower lobes, more confluent dense consolidation posterior right lower lobe. Right lower lobe bronchiectasis. Coronary artery calcific atherosclerosis. Pacer wires in right atrium and right ventricle. Calcified aortic valve.  LIVER: Normal.  BILE DUCTS: Nondilated.  GALLBLADDER: Prior cholecystectomy.  SPLEEN: Normal.  PANCREAS: Diffuse fatty replacement.  ADRENALS: Normal.  KIDNEYS/URETERS: No hydronephrosis. No renal calculi. Couple right renal hypodensities too small to characterize.    BLADDER: Markedly dilated fluid-filled bladder.  REPRODUCTIVE ORGANS: Status post hysterectomy.    BOWEL: Irregularly marginated annular rectal soft tissue thickening with luminal narrowing 9-12 cm from anal verge with upstream dilated large bowel filled layering liquid stool with air-fluid level and also mildly dilated small bowel loops with air-fluid levels. Small hiatal hernia. Interposition of small bowel loops anterior to liver (Chilaiditi configuration).  PERITONEUM: Small pelvic free fluid.  VESSELS: Aortoiliac atherosclerosis without aneurysm.  RETROPERITONEUM/LYMPH NODES: No adenopathy.  ABDOMINAL WALL: Normal.  BONES: Severe L2 vertebral body compression deformity, new since 2018, with patchy sclerosis and retropulsed posterior fragment moderately narrowing the central canal at this level. Additional mild to moderate superior endplate compression deformities T12 and L1.    IMPRESSION:  1. Suspect obstructing annular high rectal mass.  2. Multiple groundglass airspace opacities in the visualized right lower and middle lobes with small dense consolidation in the right lower lobe, possibly infectious or inflammatory/aspiration.  3. Prominent fluid filled bladder. Correlate clinically with retention.  4. Severe L2 vertebral body compression deformity and retropulsed posterior fragment moderately narrowing the central canal at this level.

## 2022-01-21 NOTE — CONSULT NOTE ADULT - PROBLEM SELECTOR RECOMMENDATION 2
- patient to receive rectal stent  - management per surgery and GI - tentaive plan for cendoscopic colonic stent  - no evidence of metastases on current imaging  - management per surgery and GI

## 2022-01-21 NOTE — PROVIDER CONTACT NOTE (OTHER) - ASSESSMENT
Lower abdomen distended with some rebound tenderness. PAST MEDICAL HISTORY:  Anxiety and depression Denies suicidal ideas    Chronic pain neck and back    DVT (deep venous thrombosis) pt reported h/o DVT (L) LE in 20 years ago    High cholesterol     History of UTI     Migraine     Nicotine dependence

## 2022-01-22 LAB
ANION GAP SERPL CALC-SCNC: 14 MMOL/L — SIGNIFICANT CHANGE UP (ref 5–17)
ANION GAP SERPL CALC-SCNC: 23 MMOL/L — HIGH (ref 5–17)
BUN SERPL-MCNC: 14 MG/DL — SIGNIFICANT CHANGE UP (ref 7–23)
BUN SERPL-MCNC: 16 MG/DL — SIGNIFICANT CHANGE UP (ref 7–23)
CALCIUM SERPL-MCNC: 7.1 MG/DL — LOW (ref 8.4–10.5)
CALCIUM SERPL-MCNC: 8.1 MG/DL — LOW (ref 8.4–10.5)
CHLORIDE SERPL-SCNC: 104 MMOL/L — SIGNIFICANT CHANGE UP (ref 96–108)
CHLORIDE SERPL-SCNC: 105 MMOL/L — SIGNIFICANT CHANGE UP (ref 96–108)
CO2 SERPL-SCNC: 14 MMOL/L — LOW (ref 22–31)
CO2 SERPL-SCNC: 26 MMOL/L — SIGNIFICANT CHANGE UP (ref 22–31)
CREAT SERPL-MCNC: 0.36 MG/DL — LOW (ref 0.5–1.3)
CREAT SERPL-MCNC: 0.59 MG/DL — SIGNIFICANT CHANGE UP (ref 0.5–1.3)
GLUCOSE SERPL-MCNC: 102 MG/DL — HIGH (ref 70–99)
GLUCOSE SERPL-MCNC: 70 MG/DL — SIGNIFICANT CHANGE UP (ref 70–99)
HCT VFR BLD CALC: 18.9 % — CRITICAL LOW (ref 34.5–45)
HCT VFR BLD CALC: 28.2 % — LOW (ref 34.5–45)
HGB BLD-MCNC: 5.4 G/DL — CRITICAL LOW (ref 11.5–15.5)
HGB BLD-MCNC: 8.7 G/DL — LOW (ref 11.5–15.5)
MAGNESIUM SERPL-MCNC: 1.4 MG/DL — LOW (ref 1.6–2.6)
MAGNESIUM SERPL-MCNC: 2.3 MG/DL — SIGNIFICANT CHANGE UP (ref 1.6–2.6)
MCHC RBC-ENTMCNC: 27.4 PG — SIGNIFICANT CHANGE UP (ref 27–34)
MCHC RBC-ENTMCNC: 28.6 GM/DL — LOW (ref 32–36)
MCHC RBC-ENTMCNC: 28.6 PG — SIGNIFICANT CHANGE UP (ref 27–34)
MCHC RBC-ENTMCNC: 30.9 GM/DL — LOW (ref 32–36)
MCV RBC AUTO: 92.8 FL — SIGNIFICANT CHANGE UP (ref 80–100)
MCV RBC AUTO: 95.9 FL — SIGNIFICANT CHANGE UP (ref 80–100)
NRBC # BLD: 0 /100 WBCS — SIGNIFICANT CHANGE UP (ref 0–0)
NRBC # BLD: 0 /100 WBCS — SIGNIFICANT CHANGE UP (ref 0–0)
PHOSPHATE SERPL-MCNC: 1.4 MG/DL — LOW (ref 2.5–4.5)
PHOSPHATE SERPL-MCNC: 2.6 MG/DL — SIGNIFICANT CHANGE UP (ref 2.5–4.5)
PLATELET # BLD AUTO: 145 K/UL — LOW (ref 150–400)
PLATELET # BLD AUTO: 225 K/UL — SIGNIFICANT CHANGE UP (ref 150–400)
POTASSIUM SERPL-MCNC: 3.9 MMOL/L — SIGNIFICANT CHANGE UP (ref 3.5–5.3)
POTASSIUM SERPL-MCNC: 3.9 MMOL/L — SIGNIFICANT CHANGE UP (ref 3.5–5.3)
POTASSIUM SERPL-SCNC: 3.9 MMOL/L — SIGNIFICANT CHANGE UP (ref 3.5–5.3)
POTASSIUM SERPL-SCNC: 3.9 MMOL/L — SIGNIFICANT CHANGE UP (ref 3.5–5.3)
RBC # BLD: 1.97 M/UL — LOW (ref 3.8–5.2)
RBC # BLD: 3.04 M/UL — LOW (ref 3.8–5.2)
RBC # FLD: 14.6 % — HIGH (ref 10.3–14.5)
RBC # FLD: 14.6 % — HIGH (ref 10.3–14.5)
SODIUM SERPL-SCNC: 141 MMOL/L — SIGNIFICANT CHANGE UP (ref 135–145)
SODIUM SERPL-SCNC: 145 MMOL/L — SIGNIFICANT CHANGE UP (ref 135–145)
WBC # BLD: 4.9 K/UL — SIGNIFICANT CHANGE UP (ref 3.8–10.5)
WBC # BLD: 7.79 K/UL — SIGNIFICANT CHANGE UP (ref 3.8–10.5)
WBC # FLD AUTO: 4.9 K/UL — SIGNIFICANT CHANGE UP (ref 3.8–10.5)
WBC # FLD AUTO: 7.79 K/UL — SIGNIFICANT CHANGE UP (ref 3.8–10.5)

## 2022-01-22 PROCEDURE — 99291 CRITICAL CARE FIRST HOUR: CPT

## 2022-01-22 PROCEDURE — 71260 CT THORAX DX C+: CPT | Mod: 26

## 2022-01-22 PROCEDURE — 71045 X-RAY EXAM CHEST 1 VIEW: CPT | Mod: 26

## 2022-01-22 PROCEDURE — 99233 SBSQ HOSP IP/OBS HIGH 50: CPT

## 2022-01-22 PROCEDURE — 93970 EXTREMITY STUDY: CPT | Mod: 26

## 2022-01-22 RX ORDER — AMIODARONE HYDROCHLORIDE 400 MG/1
0.5 TABLET ORAL
Qty: 900 | Refills: 0 | Status: DISCONTINUED | OUTPATIENT
Start: 2022-01-22 | End: 2022-01-25

## 2022-01-22 RX ORDER — SODIUM CHLORIDE 9 MG/ML
500 INJECTION, SOLUTION INTRAVENOUS ONCE
Refills: 0 | Status: COMPLETED | OUTPATIENT
Start: 2022-01-22 | End: 2022-01-22

## 2022-01-22 RX ORDER — HEPARIN SODIUM 5000 [USP'U]/ML
5000 INJECTION INTRAVENOUS; SUBCUTANEOUS EVERY 8 HOURS
Refills: 0 | Status: DISCONTINUED | OUTPATIENT
Start: 2022-01-22 | End: 2022-01-22

## 2022-01-22 RX ORDER — HEPARIN SODIUM 5000 [USP'U]/ML
5000 INJECTION INTRAVENOUS; SUBCUTANEOUS EVERY 12 HOURS
Refills: 0 | Status: DISCONTINUED | OUTPATIENT
Start: 2022-01-22 | End: 2022-01-26

## 2022-01-22 RX ORDER — AMIODARONE HYDROCHLORIDE 400 MG/1
150 TABLET ORAL ONCE
Refills: 0 | Status: COMPLETED | OUTPATIENT
Start: 2022-01-22 | End: 2022-01-22

## 2022-01-22 RX ADMIN — HEPARIN SODIUM 5000 UNIT(S): 5000 INJECTION INTRAVENOUS; SUBCUTANEOUS at 18:47

## 2022-01-22 RX ADMIN — SODIUM CHLORIDE 500 MILLILITER(S): 9 INJECTION, SOLUTION INTRAVENOUS at 05:49

## 2022-01-22 RX ADMIN — AZITHROMYCIN 255 MILLIGRAM(S): 500 TABLET, FILM COATED ORAL at 12:14

## 2022-01-22 RX ADMIN — AMIODARONE HYDROCHLORIDE 600 MILLIGRAM(S): 400 TABLET ORAL at 21:17

## 2022-01-22 RX ADMIN — LATANOPROST 1 DROP(S): 0.05 SOLUTION/ DROPS OPHTHALMIC; TOPICAL at 21:35

## 2022-01-22 RX ADMIN — OLANZAPINE 2.5 MILLIGRAM(S): 15 TABLET, FILM COATED ORAL at 00:44

## 2022-01-22 RX ADMIN — AMIODARONE HYDROCHLORIDE 16.7 MG/MIN: 400 TABLET ORAL at 13:11

## 2022-01-22 RX ADMIN — CEFTRIAXONE 100 MILLIGRAM(S): 500 INJECTION, POWDER, FOR SOLUTION INTRAMUSCULAR; INTRAVENOUS at 16:54

## 2022-01-22 RX ADMIN — CHLORHEXIDINE GLUCONATE 1 APPLICATION(S): 213 SOLUTION TOPICAL at 05:49

## 2022-01-22 RX ADMIN — AMIODARONE HYDROCHLORIDE 16.7 MG/MIN: 400 TABLET ORAL at 00:02

## 2022-01-22 NOTE — PROGRESS NOTE ADULT - ASSESSMENT
88yo female with afib (on Xarelto) dementia presents with weakness and poor PO intake. Exam significant for distention, CT with evidence of large bowel obstruction, narrowing of the rectum 9-12cm from the anal verge, differential includes malignancy vs infectious/inflammatory.     # Rectal stricture  mass vs inflammation  -Per Cardiology, patient considered intermediate risk for low-intermediate risk procedure  - Flex Sig (1/21/22); Solid stool seen in the rectum. An obstructive mass lesion seen in the rectosigmoid at ~10 cm from the anal verge that could not be traversed with the scope. A guidewire was passed through a pinhole opening and a uncovered 24 x 120 mm SEMS was deployed across the mass under fluoroscopic guidance. No contrast leak was observed after stent deployment.  - H/H remains stable, with outlier hgb 5.4 on AM labs however repeat draw with no transfusion noted to be 8.7   - NGT to low intermittent suction   - NPO for now   - Patient noted to have BMs overnight, can consider initiating PO Miralax 17 g daily     Case discussed with c attending and primary team.     Natali Sanchez DO  Gastroenterology Fellow  Pager: [phone]

## 2022-01-22 NOTE — PROGRESS NOTE ADULT - ATTENDING COMMENTS
s/p rectal stent placement. overnight a. rib wit RVR and delirium. on amiodarone with improvement of arrythmia. can use zyprexa PRN for delirum.

## 2022-01-22 NOTE — DIETITIAN INITIAL EVALUATION ADULT. - NAME AND PHONE
Samantha Gitlin, RD, CDN, [facility], [id number] or available on [facility]

## 2022-01-22 NOTE — PROGRESS NOTE ADULT - ASSESSMENT
88 yo female with Pmhx of Afib (on Xarelto) with SN dysfunction s/p PPM, dementia presents with failure to thrive with weakness and poor PO intake found to have a large bowel obstruction, narrowing of the rectum 9-12cm from the anal verge, with plan for Flex/SIg +/- Stenting with course notable for Afib with RVR. Cardiology consulted for cardiac optimization    1) Atrial Fibrillation with RVR in the setting of large bowel obstruction  -Patient with known Hx of Afib on Xarelto, however with active Eliquis prescription as well, here with large bowel obstruction  -Pt admitted with Afib RVR with -160 with SBP 90's , s/p multiple pushes of IV lopressor by primary team  - Patient received Amio bolus over 30 mns to avoid iatrogenic hypotension, with conversion to NSR later in the day  - Overnight patient remained in NSR  -c/w maintenance Amio gtt @ .5 mg/min   - Echo shwoing preserved BIV function without valvular pathology  -Ptt planned for endoscopic stenting. SHe is considered intermediate risk for low-intermediate risk procedure  -AC when cleared by primary team. CHADSVASC2  = 3, no need for bridging with IV heparin while holding AC  -Palliative consulted for further GOC disucssion

## 2022-01-22 NOTE — PROGRESS NOTE ADULT - SUBJECTIVE AND OBJECTIVE BOX
GASTROENTEROLOGY PROGRESS NOTE  Patient seen and examined at bedside. Overnight, patient noted to sundown- pulling at lines - given haldol 0.5 x1. Started zyprexa as per Palliative note. Pt Removed NGT-Replaced and adjusted x1 as was at [facility]. Restraints ordered. pt pulled ngt out @5am not replaced.  Had 3 large Liquid bm overnight. Repeat CBC: 9.5 from 8.0 no changes. UOP dec @5 given 500 cc bolus x1. colonoscopy report: obstructive mass lesion seen in the rectosigmoid juc and Sugg miralax? Hgb AM 5.4, repeat 8.7    ROS: Patient with no complaints, no abdominal pain. No nausea/vomiting.     PERTINENT REVIEW OF SYSTEMS:  CONSTITUTIONAL: No weakness, fevers or chills  HEENT: No visual changes; No vertigo or throat pain   GASTROINTESTINAL: As above.  NEUROLOGICAL: No numbness or weakness  SKIN: No itching, burning, rashes, or lesions     Allergies    No Known Allergies    Intolerances      MEDICATIONS:  MEDICATIONS  (STANDING):  aMIOdarone Infusion 0.5 mG/Min (16.7 mL/Hr) IV Continuous <Continuous>  azithromycin  IVPB 500 milliGRAM(s) IV Intermittent daily  cefTRIAXone   IVPB 1000 milliGRAM(s) IV Intermittent every 24 hours  chlorhexidine 2% Cloths 1 Application(s) Topical <User Schedule>  heparin   Injectable 5000 Unit(s) SubCutaneous every 12 hours  lactated ringers. 1000 milliLiter(s) (75 mL/Hr) IV Continuous <Continuous>  latanoprost 0.005% Ophthalmic Solution 1 Drop(s) Both EYES at bedtime    MEDICATIONS  (PRN):  OLANZapine Injectable 2.5 milliGRAM(s) IntraMuscular every 6 hours PRN agitation    Vital Signs Last 24 Hrs  T(C): 36.1 (2022 13:38), Max: 36.7 (2022 14:23)  T(F): 97 (2022 13:38), Max: 98.1 (2022 14:23)  HR: 87 (2022 13:00) (85 - 110)  BP: 93/55 (2022 13:00) (92/54 - 149/97)  BP(mean): 69 (2022 13:00) (69 - 117)  RR: 15 (2022 13:00) (14 - 30)  SpO2: 95% (2022 13:00) (92% - 100%)     @ 07:  -   @ 07:00  --------------------------------------------------------  IN: 3314.8 mL / OUT: 387 mL / NET: 2927.8 mL     @ 07:  -   @ 14:21  --------------------------------------------------------  IN: 741.9 mL / OUT: 190 mL / NET: 551.9 mL      PHYSICAL EXAM:    General: frail appearing female, lying in bed; in no acute distress  HEENT: MMM, conjunctiva and sclera clear  Gastrointestinal: Soft; mildly distended; some tenderness in LLQ; No rebound or guarding  Skin: Warm and dry. No obvious rash    LABS:                        8.7    7.79  )-----------( 225      ( 2022 07:03 )             28.2         145  |  105  |  16  ----------------------------<  102<H>  3.9   |  26  |  0.59    Ca    8.1<L>      2022 07:03  Phos  2.6       Mg     2.3         TPro  4.5<L>  /  Alb  2.5<L>  /  TBili  0.2  /  DBili  x   /  AST  8<L>  /  ALT  <5<L>  /  AlkPhos  48      PT/INR - ( 2022 08:08 )   PT: 11.7 sec;   INR: 0.97          PTT - ( 2022 08:08 )  PTT:30.7 sec      Urinalysis Basic - ( 2022 23:48 )    Color: Yellow / Appearance: Clear / S.020 / pH: x  Gluc: x / Ketone: 15 mg/dL  / Bili: Moderate / Urobili: 1.0 E.U./dL   Blood: x / Protein: Trace mg/dL / Nitrite: NEGATIVE   Leuk Esterase: NEGATIVE / RBC: < 5 /HPF / WBC < 5 /HPF   Sq Epi: x / Non Sq Epi: 0-5 /HPF / Bacteria: Present /HPF                Culture - Blood (collected 2022 00:53)  Source: .Blood Blood-Peripheral  Preliminary Report (2022 01:02):    No growth to date.    Culture - Blood (collected 2022 00:51)  Source: .Blood Blood-Peripheral  Preliminary Report (2022 01:02):    No growth to date.    Urinalysis with Rflx Culture (collected 2022 23:48)      RADIOLOGY & ADDITIONAL STUDIES:  Reviewed

## 2022-01-22 NOTE — DIETITIAN INITIAL EVALUATION ADULT. - OTHER INFO
88yo female PMH of dementia, Afib (Xarelto, Last taken Tuesday) PPM (Indicated for tachy-yudith syndrome), cerebral LICA paraclinoid aneurysm (s/p stenting). Admitted for abdominal pain and 5 days of poor to none oral intake. CT scan done during this admission shows large bowel obstruction, narrowing of the rectum 9-12cm from the anal verge. Impression is large bowel obstruction, differential includes malignancy vs infectious/inflammatory. S/p sigmoidoscopy with stent placement. Started on zyprexa overnight for agitation/restlessness. Pt seen in room, asleep, left to rest. Pt's daughter at bedside. Daughter reported that since a back injury in August 2021, pt's nutritional status has declined as well as her mental status. Pt's appetite/intake fluctuates during the week, and some days she is able to consume 3 meals/day and some days a few bites of something (daughter cooks food at home to bring to pt). Generally eats healthy but also likes hot dogs, fish and chips, ice cream, etc. Confirmed NKFA. Weight has dropped from ~135# 1 year ago per daughter (per EMR chart review, pt weighed 120# in Dec 2019). Currently pt is NPO w/LR running at 75ml/hr. Per SICU, likely plan for PICC placement and TPN initiation on Monday. Unable to assess for subjective report of N/V or pain currently, but PTA was complaining of severe abdominal pain and constipation. Had 3 BMs overnight. Skin intact pressure-wise, no edema. Afebrile. NFPE limited but able to complete visual exam. All questions from daughter answered. Will continue to follow per RD protocol. 86yo female PMH of dementia, Afib (Xarelto, Last taken Tuesday) PPM (Indicated for tachy-yudith syndrome), cerebral LICA paraclinoid aneurysm (s/p stenting). Admitted for abdominal pain and 5 days of poor to none oral intake. CT scan done during this admission shows large bowel obstruction, narrowing of the rectum 9-12cm from the anal verge. Impression is large bowel obstruction, differential includes malignancy vs infectious/inflammatory. S/p sigmoidoscopy with stent placement. Started on zyprexa overnight for agitation/restlessness. Pt seen in room, asleep, left to rest. Pt's daughter at bedside. Daughter reported that since a back injury in August 2021, pt's nutritional status has declined as well as her mental status. Pt's appetite/intake fluctuates during the week, and some days she is able to consume 3 meals/day and some days a few bites of something (daughter cooks food at home to bring to pt). Generally eats healthy but also likes hot dogs, fish and chips, ice cream, etc. Confirmed NKFA. Weight has dropped from ~135# 1 year ago per daughter (per EMR chart review, pt weighed 120# in Dec 2019). Currently pt is NPO w/LR running at 75ml/hr. Had sump placed to LIWS but pt removed it this AM. Per SICU, likely plan for PICC placement and TPN initiation on Monday. Unable to assess for subjective report of N/V or pain currently, but PTA was complaining of severe abdominal pain and constipation. Had 3 BMs overnight. Skin intact pressure-wise, no edema. Afebrile. NFPE limited but able to complete visual exam. All questions from daughter answered. Will continue to follow per RD protocol.

## 2022-01-22 NOTE — PROGRESS NOTE ADULT - ATTENDING COMMENTS
88yo female with afib (on Xarelto) dementia presents with weakness and poor PO intake. Exam significant for distention, CT with evidence of large bowel obstruction, narrowing of the rectum 9-12cm from the anal verge, differential includes malignancy vs infectious/inflammatory.     # Rectal stricture  mass vs inflammation  -Per Cardiology, patient considered intermediate risk for low-intermediate risk procedure  - Flex Sig (1/21/22); Solid stool seen in the rectum. An obstructive mass lesion seen in the rectosigmoid at ~10 cm from the anal verge that could not be traversed with the scope. A guidewire was passed through a pinhole opening and a uncovered 24 x 120 mm SEMS was deployed across the mass under fluoroscopic guidance. No contrast leak was observed after stent deployment.  - H/H remains stable, with outlier hgb 5.4 on AM labs however repeat draw with no transfusion noted to be 8.7   - NGT to low intermittent suction   - NPO for now   - Patient noted to have BMs overnight, can consider initiating PO Miralax 17 g daily

## 2022-01-22 NOTE — PATIENT PROFILE ADULT - FALL HARM RISK - HARM RISK INTERVENTIONS
Assistance with ambulation/Assistance OOB with selected safe patient handling equipment/Communicate Risk of Fall with Harm to all staff/Monitor gait and stability/Reinforce activity limits and safety measures with patient and family/Sit up slowly, dangle for a short time, stand at bedside before walking/Tailored Fall Risk Interventions/Visual Cue: Yellow wristband and red socks/Bed in lowest position, wheels locked, appropriate side rails in place/Call bell, personal items and telephone in reach/Instruct patient to call for assistance before getting out of bed or chair/Non-slip footwear when patient is out of bed/[place] to call system/Physically safe environment - no spills, clutter or unnecessary equipment/Purposeful Proactive Rounding/Room/bathroom lighting operational, light cord in reach

## 2022-01-22 NOTE — DIETITIAN INITIAL EVALUATION ADULT. - OTHER CALCULATIONS
ActualBW used for calculations as pt weighs <100% of IBW, per critical care nutrition guidelines (81% IBW). Needs estimated for protein-calorie malnutrition and possible pre-op

## 2022-01-22 NOTE — PROGRESS NOTE ADULT - ASSESSMENT
s/p colonic stenting of an obstructing rectal mass.  Plan is to complete staging workup, and Dr. Pérez to discuss treatment plan thereafter with patient's family.  For now, in an effort to improve patient's nutritional status, will start TPN while awaiting ability to take full needs PO as stent takes effect.

## 2022-01-22 NOTE — PROGRESS NOTE ADULT - ASSESSMENT
86yo female PMH of dementia, Afib (Xarelto, Last taken Tuesday) PPM (Indicated for tachy-yudith syndrome), cerebral LICA paraclinoid aneurysm (s/p stenting). Admitted for abdominal pain and 5 days of poor to none oral intake. CT scan done during this admission shows large bowel obstruction, narrowing of the rectum 9-12cm from the anal verge. Impression is large bowel obstruction, differential includes malignancy vs infectious/inflammatory. Today went to Afib with RVR, home betablocker not restarted. Required multiple pushes of betablocker IV, calcium channel blocker and amiodarone.  Asymptomatic and hemodynamically stable, no need of pressors. Abdomen non-peritonitic. Hemoglobin decreased 2 units. Possible  GI bleed from mass vs chronic disease.     NEURO:  Zyprexa 2.5 q6 prn agitation.   HEENT: latanoprost   CV: goal MAP >65. Paaroxysmal Afib with Episode of RVR now resolved: Cont Amiodarone gtt. Holding Xarelto (C2V 3-4). Cardiology c/s appreciated   PULM: Goran>94%CAP: Cont Abx will get Chest CT to complete CA staging,   GI/FEN: Failure to thrive poss start TPN, Obstructing Rectal mass s/p Sigmoidoscopy with stent placement, Pending surgical planning; NGT, NPO, LR@75. plan for PICC placement (likely Monday and start TPN  : Reyes   ENDO: ISS  HEME: Holding Xarelto will need Chest CT to complete staging  ID: CAP: cont Ceftriaxone(1/21-) , Azithromycin (1/21- )  PPx: SCD/SQH   LINES: PIV   WOUNDS/DRAINS: None   PT/OT: ordered   Dispo: Palliative care following cont SICU, Full code

## 2022-01-22 NOTE — PROGRESS NOTE ADULT - SUBJECTIVE AND OBJECTIVE BOX
Cardiology Consult    O/N:  Interval History:  Telemetry:    OBJECTIVE  Vitals:  T(C): 36.5 (22 @ 05:10), Max: 36.7 (22 @ 14:23)  HR: 101 (22 @ 07:00) (85 - 150)  BP: 144/71 (22 @ 07:00) (92/54 - 149/97)  RR: 25 (22 @ 07:00) (16 - 30)  SpO2: 100% (22 @ 07:00) (94% - 100%)  Wt(kg): --    I/O:  I&O's Summary    2022 07:01  -  2022 07:00  --------------------------------------------------------  IN: 3314.8 mL / OUT: 387 mL / NET: 2927.8 mL        PHYSICAL EXAM:  Appearance: NAD. Speaking in full sentences.   HEENT: No pallor noted.  Conjunctiva clear b/l. Moist oral mucosa.  Cardiovascular: RRR with no murmurs.  Respiratory: Lungs CTAB.   Gastrointestinal:  Soft, nontender. Non-distended. Non-rigid.	  Extremities: No edema b/l. No erythema b/l. LE WWP b/l.  Vascular: DP intact  Neurologic:  Alert and awake. Moving all extremities. Following commands.   	  LABS:                        8.7    7.79  )-----------( 225      ( 2022 07:03 )             28.2         145  |  105  |  16  ----------------------------<  102<H>  3.9   |  26  |  0.59    Ca    8.1<L>      2022 07:03  Phos  2.6       Mg     2.3         TPro  4.5<L>  /  Alb  2.5<L>  /  TBili  0.2  /  DBili  x   /  AST  8<L>  /  ALT  <5<L>  /  AlkPhos  48      PT/INR - ( 2022 08:08 )   PT: 11.7 sec;   INR: 0.97          PTT - ( 2022 08:08 )  PTT:30.7 sec  Urinalysis Basic - ( 2022 23:48 )    Color: Yellow / Appearance: Clear / S.020 / pH: x  Gluc: x / Ketone: 15 mg/dL  / Bili: Moderate / Urobili: 1.0 E.U./dL   Blood: x / Protein: Trace mg/dL / Nitrite: NEGATIVE   Leuk Esterase: NEGATIVE / RBC: < 5 /HPF / WBC < 5 /HPF   Sq Epi: x / Non Sq Epi: 0-5 /HPF / Bacteria: Present /HPF        RADIOLOGY & ADDITIONAL TESTS:  Reviewed .    MEDICATIONS  (STANDING):  aMIOdarone Infusion 0.5 mG/Min (16.7 mL/Hr) IV Continuous <Continuous>  azithromycin  IVPB 500 milliGRAM(s) IV Intermittent daily  cefTRIAXone   IVPB 1000 milliGRAM(s) IV Intermittent every 24 hours  chlorhexidine 2% Cloths 1 Application(s) Topical <User Schedule>  lactated ringers. 1000 milliLiter(s) (75 mL/Hr) IV Continuous <Continuous>  latanoprost 0.005% Ophthalmic Solution 1 Drop(s) Both EYES at bedtime    MEDICATIONS  (PRN):  OLANZapine Injectable 2.5 milliGRAM(s) IntraMuscular every 6 hours PRN agitation     Cardiology Consult    O/N: NSR 90-100s bpm  Interval History:   Telemetry:    OBJECTIVE  Vitals:  T(C): 36.5 (22 @ 05:10), Max: 36.7 (22 @ 14:23)  HR: 101 (22 @ 07:00) (85 - 150)  BP: 144/71 (22 @ 07:00) (92/54 - 149/97)  RR: 25 (22 @ 07:00) (16 - 30)  SpO2: 100% (22 @ 07:00) (94% - 100%)  Wt(kg): --    I/O:  I&O's Summary    2022 07:01  -  2022 07:00  --------------------------------------------------------  IN: 3314.8 mL / OUT: 387 mL / NET: 2927.8 mL        PHYSICAL EXAM:  Constitutional: Frail in NAD  Head: NC/AT  Neck: supple; no JVD   Respiratory: CTA B/L; no W/R/R, n  Cardiac: +S1/S2; RRR; no M/R/G  Gastrointestinal: soft, NT/ND; no rebound or guarding; +BS  Extremities: WWP, no clubbing or cyanosis; no peripheral edema      	  LABS:                        8.7    7.79  )-----------( 225      ( 2022 07:03 )             28.2         145  |  105  |  16  ----------------------------<  102<H>  3.9   |  26  |  0.59    Ca    8.1<L>      2022 07:03  Phos  2.6       Mg     2.3         TPro  4.5<L>  /  Alb  2.5<L>  /  TBili  0.2  /  DBili  x   /  AST  8<L>  /  ALT  <5<L>  /  AlkPhos  48      PT/INR - ( 2022 08:08 )   PT: 11.7 sec;   INR: 0.97          PTT - ( 2022 08:08 )  PTT:30.7 sec  Urinalysis Basic - ( 2022 23:48 )    Color: Yellow / Appearance: Clear / S.020 / pH: x  Gluc: x / Ketone: 15 mg/dL  / Bili: Moderate / Urobili: 1.0 E.U./dL   Blood: x / Protein: Trace mg/dL / Nitrite: NEGATIVE   Leuk Esterase: NEGATIVE / RBC: < 5 /HPF / WBC < 5 /HPF   Sq Epi: x / Non Sq Epi: 0-5 /HPF / Bacteria: Present /HPF        RADIOLOGY & ADDITIONAL TESTS:  Reviewed .    MEDICATIONS  (STANDING):  aMIOdarone Infusion 0.5 mG/Min (16.7 mL/Hr) IV Continuous <Continuous>  azithromycin  IVPB 500 milliGRAM(s) IV Intermittent daily  cefTRIAXone   IVPB 1000 milliGRAM(s) IV Intermittent every 24 hours  chlorhexidine 2% Cloths 1 Application(s) Topical <User Schedule>  lactated ringers. 1000 milliLiter(s) (75 mL/Hr) IV Continuous <Continuous>  latanoprost 0.005% Ophthalmic Solution 1 Drop(s) Both EYES at bedtime    MEDICATIONS  (PRN):  OLANZapine Injectable 2.5 milliGRAM(s) IntraMuscular every 6 hours PRN agitation

## 2022-01-22 NOTE — PROGRESS NOTE ADULT - ASSESSMENT
86yo female with afib (on Xarelto) dementia presents with weakness and poor PO intake. Afebrile, tachycardic, normotensive. Exam significant for distention, with LLQ tenderness, no rebound or guarding. Lungs coarse breath sounds with cough. Labs demonstrating normal WBC and LA, elevated BNP, CT with evidence of large bowel obstruction, narrowing of the rectum 9-12cm from the anal verge. Impression is large bowel obstruction, differential includes malignancy vs infectious/inflammatory. 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 SEMS was deployed through guide wire w/o any leak noted afterwards.    - NPO/IVF  - PICC consult and TPN  - IVABx (Ceftriaxone, Azithromycin)  - SCD/ restart SQH  - IS/OOB  - F/U cards rec for Afib  - F/U GI recs  - No rectal tube  - Rest of care per SICU

## 2022-01-22 NOTE — DIETITIAN NUTRITION RISK NOTIFICATION - TREATMENT: THE FOLLOWING DIET HAS BEEN RECOMMENDED
1. TPN via PICC:  225g Dex, 63g AA, 20g SMOF Lipids to provide in total 1217 Kcal, 63g protein, GIR 3.7mg/kg/min, 1.5g/kg IBW protein.   Recommend checking TG before starting TPN and then check TG weekly. Check Mg, Phos, K daily and POC BG Q6hrs. Trend daily weights. Fluids and lytes per MD discretion. Start at 100g Dex on Day 1, 225g Dex on Day 2. *Monitor lytes very closely for s/s refeeding syndrome* 2. Utilize GI tract as medically feasible 3. Pain and bowel regimens per team 4. Keep nutrition aligned with GOC at all times

## 2022-01-22 NOTE — PROGRESS NOTE ADULT - SUBJECTIVE AND OBJECTIVE BOX
Attending Surgeon Progress Note (Covering for Dr. Pérez)    STATUS POST:  colonic stent for obstructing rectal mass       SUBJECTIVE:  Flatus: Y  Bowel movement: Y     PHYSICAL EXAM:  Arousable, partially oriented (to person)  Lungs:  decreased BS R base, poor inspiratory effort  Cor:  irreg irreg  Abdomen:  soft,moderately distended, nontender, +bowel sounds, no guarding  Ext:  no edema, well-perfused

## 2022-01-22 NOTE — PROGRESS NOTE ADULT - SUBJECTIVE AND OBJECTIVE BOX
INTERVAL/OVERNIGHT EVENTS: Confused- pulling at lines - given haldol 0.5 x1. Started zyprexa as per Palliative note( Pt Full code). pt Removed NGT-Replaced and adjusted x1 as was at ge junction. Restraints ordered. pt pulled ngt out @5am not replaced.  had 3 large Liquid bm overnight. Repeat CBC: 9.5 from 8.0 no changes. Rectal Tube?- wrote chief who will as Dr Pérez. UO dec @5 given 500 cc bolus x1. colonoscopy report: obstructive mass lesion seen in the rectosigmoid juc and Sugg miralax? Hgb AM 5.4. Sent CBC repeat      SUBJECTIVE: confused, oriented to name only; moans at bedside but denies any abdominal pain; no other concerns     Neurologic Medications  OLANZapine Injectable 2.5 milliGRAM(s) IntraMuscular every 6 hours PRN agitation    Respiratory Medications    Cardiovascular Medications  aMIOdarone Infusion 0.5 mG/Min IV Continuous <Continuous>    Gastrointestinal Medications  lactated ringers. 1000 milliLiter(s) IV Continuous <Continuous>    Genitourinary Medications    Hematologic/Oncologic Medications  heparin   Injectable 5000 Unit(s) SubCutaneous every 8 hours    Antimicrobial/Immunologic Medications  azithromycin  IVPB 500 milliGRAM(s) IV Intermittent daily  cefTRIAXone   IVPB 1000 milliGRAM(s) IV Intermittent every 24 hours    Endocrine/Metabolic Medications    Topical/Other Medications  chlorhexidine 2% Cloths 1 Application(s) Topical <User Schedule>  latanoprost 0.005% Ophthalmic Solution 1 Drop(s) Both EYES at bedtime      MEDICATIONS  (PRN):  OLANZapine Injectable 2.5 milliGRAM(s) IntraMuscular every 6 hours PRN agitation      I&O's Detail    2022 07:01  -  2022 07:00  --------------------------------------------------------  IN:    Amiodarone: 133.2 mL    Amiodarone: 133.6 mL    IV PiggyBack: 150 mL    IV PiggyBack: 548 mL    Lactated Ringers: 1350 mL    Lactated Ringers Bolus: 1000 mL  Total IN: 3314.8 mL    OUT:    Indwelling Catheter - Urethral (mL): 387 mL  Total OUT: 387 mL    Total NET: 2927.8 mL      2022 07:01  -  2022 10:45  --------------------------------------------------------  IN:  Total IN: 0 mL    OUT:    Indwelling Catheter - Urethral (mL): 20 mL  Total OUT: 20 mL    Total NET: -20 mL          Vital Signs Last 24 Hrs  T(C): 36.5 (2022 09:07), Max: 36.7 (2022 14:23)  T(F): 97.7 (2022 09:07), Max: 98.1 (2022 14:23)  HR: 94 (2022 10:00) (85 - 120)  BP: 145/64 (2022 10:00) (92/54 - 149/97)  BP(mean): 92 (2022 10:00) (70 - 117)  RR: 23 (2022 10:00) (14 - 30)  SpO2: 100% (2022 10:00) (94% - 100%)    GENERAL: NAD, moaning in bed without complaint of pain; opens eyes to voice;  malnourished appearance   HEENT: NCAT, MMM, Normal conjunctiva, PERRL  RESP: Nonlabored breathing, No respiratory distress  CARD: Normal rate, Normal peripheral perfusion. No murmurs  GI: soft, Nondistended, mildly tender to palpation , No guarding, No rebound tenderness  EXTREM: WWP, No edema, No gross deformity of extremities.   SKIN: dry skin  NEURO: AAOx1, No focal motor or sensory deficits    LABS:                        8.7    7.79  )-----------( 225      ( 2022 07:03 )             28.2         145  |  105  |  16  ----------------------------<  102<H>  3.9   |  26  |  0.59    Ca    8.1<L>      2022 07:03  Phos  2.6       Mg     2.3         TPro  4.5<L>  /  Alb  2.5<L>  /  TBili  0.2  /  DBili  x   /  AST  8<L>  /  ALT  <5<L>  /  AlkPhos  48  01-    PT/INR - ( 2022 08:08 )   PT: 11.7 sec;   INR: 0.97          PTT - ( 2022 08:08 )  PTT:30.7 sec  Urinalysis Basic - ( 2022 23:48 )    Color: Yellow / Appearance: Clear / S.020 / pH: x  Gluc: x / Ketone: 15 mg/dL  / Bili: Moderate / Urobili: 1.0 E.U./dL   Blood: x / Protein: Trace mg/dL / Nitrite: NEGATIVE   Leuk Esterase: NEGATIVE / RBC: < 5 /HPF / WBC < 5 /HPF   Sq Epi: x / Non Sq Epi: 0-5 /HPF / Bacteria: Present /HPF        RADIOLOGY & ADDITIONAL STUDIES:  CT Abdomen and Pelvis w/ IV Cont:   EXAM:  CT ABDOMEN AND PELVIS IC                           PROCEDURE DATE:  2022          INTERPRETATION:  CT ABDOMEN AND PELVIS WITH IV CONTRAST    CLINICAL INFORMATION: Abdominal pain.    COMPARISON: CT chest 2018.    PROCEDURE:  CTof the Abdomen and Pelvis was performed with intravenous contrast.  Intravenous contrast: 96 cc Omnipaque 350.  Oral contrast: None.  Sagittal and coronal reformats were performed.    FINDINGS:  LOWER CHEST: Multiple groundglass densities with peripheral predominance   in the right middle and lower lobes, more confluent dense consolidation   posterior right lower lobe. Right lower lobe bronchiectasis. Coronary   artery calcific atherosclerosis. Pacer wires in right atrium and right   ventricle. Calcified aortic valve.  LIVER: Normal.  BILE DUCTS: Nondilated.  GALLBLADDER: Prior cholecystectomy.  SPLEEN: Normal.  PANCREAS: Diffuse fatty replacement.  ADRENALS: Normal.  KIDNEYS/URETERS: No hydronephrosis. No renal calculi. Couple right renal   hypodensities too small to characterize.    BLADDER: Markedly dilated fluid-filled bladder.  REPRODUCTIVE ORGANS: Status post hysterectomy.    BOWEL: Irregularly marginated annular rectal soft tissue thickening with   luminal narrowing 9-12 cm from anal verge with upstream dilated large   bowel filled layering liquid stool with air-fluid level and also mildly   dilated small bowel loops with air-fluid levels. Small hiatal hernia.   Interposition of small bowel loops anterior to liver (Chilaiditi   configuration).  PERITONEUM: Small pelvic free fluid.  VESSELS:  Aortoiliac atherosclerosis without aneurysm.  RETROPERITONEUM/LYMPH NODES: No adenopathy.  ABDOMINAL WALL: Normal.  BONES: Severe L2 vertebral body compression deformity, new since 2018, with patchy sclerosis and retropulsed posterior fragment moderately   narrowing the central canal at this level. Additional mild to moderate   superior endplate compression deformities T12 and L1.    IMPRESSION:  1.  Suspect obstructing annular high rectal mass.  2.  Multiple groundglass airspace opacities in the visualized right lower   and middle lobes with small dense consolidation in the right lower lobe,   possibly infectious or inflammatory/aspiration.  3.  Prominent fluid filled bladder. Correlate clinically with retention.  4.  Severe L2 vertebral body compression deformity and retropulsed   posterior fragment moderately narrowing the central canal at this level.    --- End of Report ---          Thank you for the opportunity to participate in the care of this patient.      LINDA SALES MD; Attending Radiologist  This document has been electronically signed. 2022  4:08PM (22 @ 15:45)      Culture - Blood (collected 22 @ 00:53)  Source: .Blood Blood-Peripheral  Preliminary Report (22 @ 01:02):    No growth to date.    Culture - Blood (collected 22 @ 00:51)  Source: .Blood Blood-Peripheral  Preliminary Report (22 @ 01:02):    No growth to date.    Urinalysis with Rflx Culture (collected 22 @ 23:48)

## 2022-01-22 NOTE — PROGRESS NOTE ADULT - SUBJECTIVE AND OBJECTIVE BOX
SUBJECTIVE:  Flatus: [ ] YES [ ] NO             Bowel Movement: [ ] YES [ ] NO  Pain (0-10):            Pain Control Adequate: [ ] YES [ ] NO  Nausea: [ ] YES [ ] NO            Vomiting: [ ] YES [ ] NO  Diarrhea: [ ] YES [ ] NO         Constipation: [ ] YES [ ] NO     Chest Pain: [ ] YES [ ] NO    SOB:  [ ] YES [ ] NO    MEDICATIONS  (STANDING):  aMIOdarone Infusion 0.5 mG/Min (16.7 mL/Hr) IV Continuous <Continuous>  azithromycin  IVPB 500 milliGRAM(s) IV Intermittent daily  cefTRIAXone   IVPB 1000 milliGRAM(s) IV Intermittent every 24 hours  chlorhexidine 2% Cloths 1 Application(s) Topical <User Schedule>  lactated ringers. 1000 milliLiter(s) (75 mL/Hr) IV Continuous <Continuous>  latanoprost 0.005% Ophthalmic Solution 1 Drop(s) Both EYES at bedtime    MEDICATIONS  (PRN):  OLANZapine Injectable 2.5 milliGRAM(s) IntraMuscular every 6 hours PRN agitation      Vital Signs Last 24 Hrs  T(C): 36.5 (2022 05:10), Max: 36.7 (2022 14:23)  T(F): 97.7 (2022 05:10), Max: 98.1 (2022 14:23)  HR: 101 (2022 07:00) (85 - 150)  BP: 144/71 (2022 07:00) (92/54 - 149/97)  BP(mean): 100 (2022 07:00) (66 - 117)  RR: 25 (2022 07:00) (16 - 30)  SpO2: 100% (2022 07:00) (94% - 100%)    Physical Exam:  General: NAD, resting comfortably in bed  Pulmonary: Nonlabored breathing, no respiratory distress  Cardiovascular:   Abdominal: soft, much less distended, minimal tenderness w/o rebound or guarding  Extremities: WWP, normal strength  Neuro: A/O x 3, CNs II-XII grossly intact, no focal deficits, normal motor/sensation  Pulses: palpable distal pulses    I&O's Summary    2022 07:01  -  2022 07:00  --------------------------------------------------------  IN: 3314.8 mL / OUT: 387 mL / NET: 2927.8 mL        LABS:                        8.7    7.79  )-----------( 225      ( 2022 07:03 )             28.2         145  |  105  |  16  ----------------------------<  102<H>  3.9   |  26  |  0.59    Ca    8.1<L>      2022 07:03  Phos  2.6       Mg     2.3         TPro  4.5<L>  /  Alb  2.5<L>  /  TBili  0.2  /  DBili  x   /  AST  8<L>  /  ALT  <5<L>  /  AlkPhos  48      PT/INR - ( 2022 08:08 )   PT: 11.7 sec;   INR: 0.97          PTT - ( 2022 08:08 )  PTT:30.7 sec  Urinalysis Basic - ( 2022 23:48 )    Color: Yellow / Appearance: Clear / S.020 / pH: x  Gluc: x / Ketone: 15 mg/dL  / Bili: Moderate / Urobili: 1.0 E.U./dL   Blood: x / Protein: Trace mg/dL / Nitrite: NEGATIVE   Leuk Esterase: NEGATIVE / RBC: < 5 /HPF / WBC < 5 /HPF   Sq Epi: x / Non Sq Epi: 0-5 /HPF / Bacteria: Present /HPF      CAPILLARY BLOOD GLUCOSE        LIVER FUNCTIONS - ( 2022 15:32 )  Alb: 2.5 g/dL / Pro: 4.5 g/dL / ALK PHOS: 48 U/L / ALT: <5 U/L / AST: 8 U/L / GGT: x             RADIOLOGY & ADDITIONAL STUDIES:   SUBJECTIVE: Pt visited with chief resident bedside. Looks confused and disoriented. Was given Haldol ON. Had BM. Denies abdominal pain.    MEDICATIONS  (STANDING):  aMIOdarone Infusion 0.5 mG/Min (16.7 mL/Hr) IV Continuous <Continuous>  azithromycin  IVPB 500 milliGRAM(s) IV Intermittent daily  cefTRIAXone   IVPB 1000 milliGRAM(s) IV Intermittent every 24 hours  chlorhexidine 2% Cloths 1 Application(s) Topical <User Schedule>  lactated ringers. 1000 milliLiter(s) (75 mL/Hr) IV Continuous <Continuous>  latanoprost 0.005% Ophthalmic Solution 1 Drop(s) Both EYES at bedtime    MEDICATIONS  (PRN):  OLANZapine Injectable 2.5 milliGRAM(s) IntraMuscular every 6 hours PRN agitation      Vital Signs Last 24 Hrs  T(C): 36.5 (2022 05:10), Max: 36.7 (2022 14:23)  T(F): 97.7 (2022 05:10), Max: 98.1 (2022 14:23)  HR: 101 (2022 07:00) (85 - 150)  BP: 144/71 (2022 07:00) (92/54 - 149/97)  BP(mean): 100 (2022 07:00) (66 - 117)  RR: 25 (2022 07:00) (16 - 30)  SpO2: 100% (2022 07:00) (94% - 100%)    Physical Exam:    GA: NAD, moaning in bed without complaint of pain; opens eyes to voice;  malnourished appearance   Lungs: Nonlabored breathing, No respiratory distress  Heart: Irregular rate.  Abdomen: Soft, nondistended, mildly tender to palpation, w/o rebound or guarding  Extremities: WWP, No edema, No gross deformity of extremities.   NEURO: AAOx1, No focal motor or sensory deficits    I&O's Summary    2022 07:01  -  2022 07:00  --------------------------------------------------------  IN: 3314.8 mL / OUT: 387 mL / NET: 2927.8 mL        LABS:                        8.7    7.79  )-----------( 225      ( 2022 07:03 )             28.2         145  |  105  |  16  ----------------------------<  102<H>  3.9   |  26  |  0.59    Ca    8.1<L>      2022 07:03  Phos  2.6       Mg     2.3         TPro  4.5<L>  /  Alb  2.5<L>  /  TBili  0.2  /  DBili  x   /  AST  8<L>  /  ALT  <5<L>  /  AlkPhos  48      PT/INR - ( 2022 08:08 )   PT: 11.7 sec;   INR: 0.97          PTT - ( 2022 08:08 )  PTT:30.7 sec  Urinalysis Basic - ( 2022 23:48 )    Color: Yellow / Appearance: Clear / S.020 / pH: x  Gluc: x / Ketone: 15 mg/dL  / Bili: Moderate / Urobili: 1.0 E.U./dL   Blood: x / Protein: Trace mg/dL / Nitrite: NEGATIVE   Leuk Esterase: NEGATIVE / RBC: < 5 /HPF / WBC < 5 /HPF   Sq Epi: x / Non Sq Epi: 0-5 /HPF / Bacteria: Present /HPF      CAPILLARY BLOOD GLUCOSE        LIVER FUNCTIONS - ( 2022 15:32 )  Alb: 2.5 g/dL / Pro: 4.5 g/dL / ALK PHOS: 48 U/L / ALT: <5 U/L / AST: 8 U/L / GGT: x             RADIOLOGY & ADDITIONAL STUDIES:

## 2022-01-23 LAB
ALBUMIN SERPL ELPH-MCNC: 2.4 G/DL — LOW (ref 3.3–5)
ALP SERPL-CCNC: 60 U/L — SIGNIFICANT CHANGE UP (ref 40–120)
ALT FLD-CCNC: <5 U/L — LOW (ref 10–45)
ANION GAP SERPL CALC-SCNC: 12 MMOL/L — SIGNIFICANT CHANGE UP (ref 5–17)
AST SERPL-CCNC: 11 U/L — SIGNIFICANT CHANGE UP (ref 10–40)
BILIRUB SERPL-MCNC: 0.2 MG/DL — SIGNIFICANT CHANGE UP (ref 0.2–1.2)
BUN SERPL-MCNC: 9 MG/DL — SIGNIFICANT CHANGE UP (ref 7–23)
CALCIUM SERPL-MCNC: 8.3 MG/DL — LOW (ref 8.4–10.5)
CHLORIDE SERPL-SCNC: 104 MMOL/L — SIGNIFICANT CHANGE UP (ref 96–108)
CO2 SERPL-SCNC: 26 MMOL/L — SIGNIFICANT CHANGE UP (ref 22–31)
CREAT SERPL-MCNC: 0.59 MG/DL — SIGNIFICANT CHANGE UP (ref 0.5–1.3)
GLUCOSE SERPL-MCNC: 89 MG/DL — SIGNIFICANT CHANGE UP (ref 70–99)
HCT VFR BLD CALC: 27.9 % — LOW (ref 34.5–45)
HGB BLD-MCNC: 8.8 G/DL — LOW (ref 11.5–15.5)
MAGNESIUM SERPL-MCNC: 2 MG/DL — SIGNIFICANT CHANGE UP (ref 1.6–2.6)
MCHC RBC-ENTMCNC: 28.8 PG — SIGNIFICANT CHANGE UP (ref 27–34)
MCHC RBC-ENTMCNC: 31.5 GM/DL — LOW (ref 32–36)
MCV RBC AUTO: 91.2 FL — SIGNIFICANT CHANGE UP (ref 80–100)
NRBC # BLD: 0 /100 WBCS — SIGNIFICANT CHANGE UP (ref 0–0)
PHOSPHATE SERPL-MCNC: 3.2 MG/DL — SIGNIFICANT CHANGE UP (ref 2.5–4.5)
PLATELET # BLD AUTO: 250 K/UL — SIGNIFICANT CHANGE UP (ref 150–400)
POTASSIUM SERPL-MCNC: 3.1 MMOL/L — LOW (ref 3.5–5.3)
POTASSIUM SERPL-SCNC: 3.1 MMOL/L — LOW (ref 3.5–5.3)
PROT SERPL-MCNC: 4.7 G/DL — LOW (ref 6–8.3)
RBC # BLD: 3.06 M/UL — LOW (ref 3.8–5.2)
RBC # FLD: 14.5 % — SIGNIFICANT CHANGE UP (ref 10.3–14.5)
SODIUM SERPL-SCNC: 142 MMOL/L — SIGNIFICANT CHANGE UP (ref 135–145)
WBC # BLD: 9.05 K/UL — SIGNIFICANT CHANGE UP (ref 3.8–10.5)
WBC # FLD AUTO: 9.05 K/UL — SIGNIFICANT CHANGE UP (ref 3.8–10.5)

## 2022-01-23 PROCEDURE — 99232 SBSQ HOSP IP/OBS MODERATE 35: CPT

## 2022-01-23 PROCEDURE — 99291 CRITICAL CARE FIRST HOUR: CPT

## 2022-01-23 RX ORDER — SODIUM CHLORIDE 9 MG/ML
500 INJECTION, SOLUTION INTRAVENOUS ONCE
Refills: 0 | Status: COMPLETED | OUTPATIENT
Start: 2022-01-23 | End: 2022-01-23

## 2022-01-23 RX ORDER — POTASSIUM CHLORIDE 20 MEQ
20 PACKET (EA) ORAL
Refills: 0 | Status: COMPLETED | OUTPATIENT
Start: 2022-01-23 | End: 2022-01-23

## 2022-01-23 RX ORDER — POLYETHYLENE GLYCOL 3350 17 G/17G
17 POWDER, FOR SOLUTION ORAL DAILY
Refills: 0 | Status: DISCONTINUED | OUTPATIENT
Start: 2022-01-23 | End: 2022-01-25

## 2022-01-23 RX ADMIN — AZITHROMYCIN 255 MILLIGRAM(S): 500 TABLET, FILM COATED ORAL at 12:49

## 2022-01-23 RX ADMIN — SODIUM CHLORIDE 500 MILLILITER(S): 9 INJECTION, SOLUTION INTRAVENOUS at 19:20

## 2022-01-23 RX ADMIN — Medication 50 MILLIEQUIVALENT(S): at 10:53

## 2022-01-23 RX ADMIN — SODIUM CHLORIDE 1000 MILLILITER(S): 9 INJECTION, SOLUTION INTRAVENOUS at 14:53

## 2022-01-23 RX ADMIN — LATANOPROST 1 DROP(S): 0.05 SOLUTION/ DROPS OPHTHALMIC; TOPICAL at 21:19

## 2022-01-23 RX ADMIN — HEPARIN SODIUM 5000 UNIT(S): 5000 INJECTION INTRAVENOUS; SUBCUTANEOUS at 05:06

## 2022-01-23 RX ADMIN — Medication 50 MILLIEQUIVALENT(S): at 06:33

## 2022-01-23 RX ADMIN — Medication 50 MILLIEQUIVALENT(S): at 08:30

## 2022-01-23 RX ADMIN — SODIUM CHLORIDE 1000 MILLILITER(S): 9 INJECTION, SOLUTION INTRAVENOUS at 02:19

## 2022-01-23 RX ADMIN — SODIUM CHLORIDE 75 MILLILITER(S): 9 INJECTION, SOLUTION INTRAVENOUS at 05:06

## 2022-01-23 RX ADMIN — AMIODARONE HYDROCHLORIDE 16.7 MG/MIN: 400 TABLET ORAL at 19:36

## 2022-01-23 RX ADMIN — SODIUM CHLORIDE 75 MILLILITER(S): 9 INJECTION, SOLUTION INTRAVENOUS at 14:53

## 2022-01-23 RX ADMIN — POLYETHYLENE GLYCOL 3350 17 GRAM(S): 17 POWDER, FOR SOLUTION ORAL at 14:53

## 2022-01-23 RX ADMIN — CHLORHEXIDINE GLUCONATE 1 APPLICATION(S): 213 SOLUTION TOPICAL at 05:08

## 2022-01-23 RX ADMIN — HEPARIN SODIUM 5000 UNIT(S): 5000 INJECTION INTRAVENOUS; SUBCUTANEOUS at 18:15

## 2022-01-23 RX ADMIN — CEFTRIAXONE 100 MILLIGRAM(S): 500 INJECTION, POWDER, FOR SOLUTION INTRAMUSCULAR; INTRAVENOUS at 14:44

## 2022-01-23 NOTE — PHYSICAL THERAPY INITIAL EVALUATION ADULT - PERTINENT HX OF CURRENT PROBLEM, REHAB EVAL
87F  who presents as transfer from [facility] for further evaluation of weakness and failure to thrive. Majority of history elicited through daughter at bedside. Daughter arrived from 1 week trip away and found patient to be complaining of L sided abdominal pain and poor PO intake. Last seen well on 1/12/22, however has had poor PO intake over last week (daughter had made food that was not consumed)

## 2022-01-23 NOTE — PROGRESS NOTE ADULT - ASSESSMENT
"New Bariatric Nutrition Consultation Note    Reason For Visit: Nutrition Assessment    Bill Wisdom is a 51 year old presenting today for new bariatric nutrition consult.   Pt is interested in laparoscopic sleeve gastrectomy with Dr. Billy expected surgery in Jan 2020.  Patient is accompanied by self.  This is pt's 2nd of first of 3 required nutrition visits prior to surgery.     Pt referred by MEGA Guerra on October 2, 2019.  Patient with Co-morbidities of obesity including:   Dyslipidemia yes  Joint pain yes  Back pain yes  GERD yes     Support System Reviewed With Patient 10/2/2019   Who do you have in your support network that can be available to help you for the first 2 weeks after surgery? girlfriend   Who can you count on for support throughout your weight loss surgery journey? girlfriend   Reason for wanting weight loss surgery: \"Wants to be able to have energy, and exercise/move without pain\". He also wants to be around for his grandchildren.    ANTHROPOMETRICS:  Estimated body mass index is 45.22 kg/m  as calculated from the following:    Height as of 10/8/19: 1.702 m (5' 7\").    Weight as of 10/8/19: 131 kg (288 lb 11.2 oz).    Required weight loss goal pre-op: 10 lbs from initial consult weight (goal weight 282 lbs or less before surgery)       10/2/2019   I have tried the following methods to lose weight Exercise, Atkins type diet (low carb/high protein), Physician directed program       Weight Loss Questions Reviewed With Patient 10/2/2019   How long have you been overweight? Since late 20's to early 40's       SUPPLEMENT INFORMATION:  MVI daily, vitamin D3 5000 international unit(s) daily  Milk thistle is listed in his med list but he is no longer taking per his report    NUTRITION HISTORY:  Recall Diet Questions Reviewed With Patient 10/2/2019   Describe what you typically consume for breakfast (typical or most recent): Small cup of grits, hash browns,Turkey sausage,eggs, banana; cold cereal  " "  Describe what you typically consume for lunch (typical or most recent): 1/2 cheeseburger; split fries and 1/2 soda   Describe what you typically consume for supper (typical or most recent): Part of a turkey sandwich and a couple chips   Describe what you typically consume as snacks (typical or most recent): Chips, cookies - all day   How many ounces of water, or other low calorie drinks, do you drink daily (8 oz=1 glass)? 24 oz - water   How many ounces of caffeine (coffee, tea, pop) do you drink daily (8 oz=1 glass)? 8 oz - does not drink coffee   How many ounces of carbonated (pop, beer, sparkling water) drinks do you drinky daily (8 oz=1 glass)? 8 oz   How many ounces of juice, pop, sweet tea, sports drinks, protein drinks, other sweetened drinks, do you drink daily (8 oz=1 glass)? 8 oz    How many ounces of milk do you drink daily (8 oz=1 glass) 8 oz - 16oz   Please indicate the type of milk: 2% or skim   How often do you drink alcohol? Never   Pt notes he is trying to cut back on snacking and emotional eating, but is struggling at times.  Wakes up in the middle of the night and snacks.    Eating Habits 10/2/2019   Do you have any dietary restrictions? No   Do you currently binge eat (eat a large amount of food in a short time)? Yes   Are you an emotional eater? Yes   Do you get up to eat after falling asleep? Yes   What foods do you crave? none     PROGRESS WITH PREVIOUS GOALS:  Relating To Eating:  - Eat slowly (20-30 minutes per meal), chewing foods well (25 chews per bite/applesauce consistency)  - Continues/improving  - Eat 3 meals per day. Use 9\" Plate method (1/2 plate non-starchy vegetables/fruit, 1/4 plate lean protein, 1/4 plate whole grain starch - no more than 1/2 cup carb/meal)  - Continues  - Avoid snacking  - Continues/improving    Relating to beverages:  - Separate fluids from meals by 30 minutes before, during, and after eating  - Continues  - Avoid calorie-containing beverages  - Continues - " "still drinking some OJ  - Drink 48-64 ounces of fluid per day  - Continues    Relating to dietary supplements:  - Start a multivitamin containing iron daily  - Met    Relating to activity:  - Increase activity as able  - Not met - pt likes going to the gym but has not made a concrete goal to go on a weekly basis at this time.    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 Since then pt has gained 50 lbs. Pt finds it difficult to go to grocery store and avoid fried foods in deli. Pt recognizes that he has developed emotional eating habits. Eating to find pleasure, and not necessarily hungry.   - Can receive \"Mom's meals,\" home-delivered meals.   - Works for [employer] - snacks abundant at work.   - Was trying to drink green smoothie with PB in the morning -  But felt like it wasn't enough.   - Not currently using his CPAP (does not have it anymore - family member has it?).     Dining Out History Reviewed With Patient 10/2/2019   How often do you dine out? Nearly every day.   Where do you dine out? (select all that apply) fast food chains- [employer], [employer], chinese buffet   What types of food do you order when you dine out? chicken       Physical Activity Reviewed With Patient 10/2/2019   How often do you exercise? Less than 1 time per week   What is the duration of your exercise (in minutes)? 10 Minutes    What types of exercise do you do? walking, gym membership   What keeps you from being more active?  I am as active as I can possbily be, Pain, Shortness of breath, Lack of Time, Too tired     MALNUTRITION  % Intake: No decreased intake noted  % Weight Loss: None noted  Subcutaneous Fat Loss: None observed  Muscle Loss: None observed  Fluid Accumulation/Edema: None noted  Malnutrition Diagnosis: Patient does not meet two of the above criteria necessary " "for diagnosing malnutrition    NUTRITION DIAGNOSIS:  Obesity r/t long history of self-monitoring deficit and excessive energy intake aeb BMI >30 kg/m2. - Continues    INTERVENTION:  Reviewed post-op diet guidelines, vitamins/minerals essential post-operatively, GI anatomy of bariatric surgeries, ways to help prepare for post-op diet guidelines pre-operatively, portion/calorie-control, mindful eating and sources of protein. Discussed fluid needs and beverage intake after surgery. Recommended he practice slow, small sips of water rather than large gulps given that he cannot gulp water after surgery. Discussed that soda and juice should be avoided before and after surgery (unless following juice guidelines provided in clear/full liquid diet ed). Encouraged him to continue reducing portion sizes and increasing non-starchy vegetable intake. Discussed importance of including protein at each meal and provided sources of protein handout with in-depth discussion on protein needs after surgery. Provided him information on protein shake/bar meal replacements. Encouraged him to find alternatives to snacking during the day and at night. Provided encouragement and support. Also provided pt with letter to provide his PCP for letter of support (per his request - he was uncertain whether he obtained letter at initial visit). Provided pt with list of goals RD contact information.      Questions Reviewed With Patient 10/2/2019   How ready are you to make changes regarding your weight? Number 1 = Not ready at all to make changes up to 10 = very ready. 10   How confident are you that you can change? 1 = Not confident that you will be successful making changes up to 10 = very confident. 10       Patient Understanding: good  Expected Compliance: good    GOALS:  Relating To Eating:  - Eat slowly (20-30 minutes per meal), chewing foods well (25 chews per bite/applesauce consistency)  - Eat 3 meals per day. Use 9\" Plate method (1/2 plate " Day #2 s/p insertion of colonic stent for LBO caused by a proximal rectal mass.  Stent appears to have successfully opened up the obstruction.  Clear liquids, then advance as tolerated.  Complete staging workup (chest CT). non-starchy vegetables/fruit, 1/4 plate lean protein, 1/4 plate whole grain starch - no more than 1/2 cup carb/meal)  - Avoid snacking    *Protein Shake Criteria: no more than 210 Calories, at least 20 grams of protein, and less than 10 grams of sugar     Meal Replacement Shake Options:   [facility] smoothie (160 Calories, 20 g protein)   Premier Protein (160 Calories, 30 g protein)  Slim Fast Advanced Nutrition (180 Calories, 20 g protein)  Muscle Milk, lactose-free, 17 oz bottle (210 Calories, 30 g protein)  Integrated Supplements, no artificial sugars (110 Calories, 20 g protein)    Meal Replacement Bar Options:  [facility] Protein Shake (160 Calories, 15 g protein)  Quest Protein Bars (190 Calories, 20 g protein)  Built Bar (170 Calories, 15-20 g protein)  One Protein Bar (210 calories, 20 g protein)  [employer] Signature Protein Bar ([employer]) (190 Calories, 21 g protein)  Pure Protein Bars (180 Calories, 21 g protein)    Relating to beverages:  - Separate fluids from meals by 30 minutes before, during, and after eating  - Avoid calorie-containing beverages - Avoid juice and 2% milk  - Drink 48-64 ounces of fluid per day    Relating to dietary supplements:  - Start a multivitamin containing iron daily    Relating to activity:  - Increase activity as able    Relating to cravings:  - Practice alternatives to emotion eating      Time spent with patient: 30 minutes.  Gayathri Chicas, MS, RD, LD

## 2022-01-23 NOTE — PROGRESS NOTE ADULT - ASSESSMENT
88yo female with afib (on Xarelto) dementia presents with weakness and poor PO intake. Afebrile, tachycardic, normotensive. Exam significant for distention, with LLQ tenderness, no rebound or guarding. Lungs coarse breath sounds with cough. Labs demonstrating normal WBC and LA, elevated BNP, CT with evidence of large bowel obstruction, narrowing of the rectum 9-12cm from the anal verge. Impression is large bowel obstruction, differential includes malignancy vs infectious/inflammatory. 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 SEMS was deployed through guide wire w/o any leak noted afterwards.    - NPO w/ sips /IVF  - F/U CT chest  - F/u cardiology  - Rest of care per SICU

## 2022-01-23 NOTE — SWALLOW BEDSIDE ASSESSMENT ADULT - SLP GENERAL OBSERVATIONS
Pt received awake, lethargic, sitting in chair. Oriented to self and place, not to year. Voice was hypophonic.

## 2022-01-23 NOTE — OCCUPATIONAL THERAPY INITIAL EVALUATION ADULT - MD ORDER
86 yo female with Pmhx of Afib (on Xarelto) with SN dysfunction s/p PPM, dementia presents with failure to thrive with weakness and poor PO intake found to have a large bowel obstruction, s/p colonic stenting of an obstructing rectal mass.

## 2022-01-23 NOTE — SWALLOW BEDSIDE ASSESSMENT ADULT - SLP PERTINENT HISTORY OF CURRENT PROBLEM
88 yo female PMH of dementia, Afib (Xarelto, Last taken Tuesday) PPM (Indicated for tachy-yudith syndrome), cerebral LICA paraclinoid aneurysm (s/p stenting). Admitted for abdominal pain and 5 days of poor to none oral intake. CT scan done during this admission shows large bowel obstruction, narrowing of the rectum 9-12cm from the anal verge. Impression is large bowel obstruction, differential includes malignancy vs infectious/inflammatory. S/p Afib with RVR, home betablocker not restarted. Required multiple pushes of betablocker IV, calcium channel blocker and amiodarone.  Asymptomatic and hemodynamically stable, no need of pressors. Abdomen non-peritonitic s/p stent placement by GI.

## 2022-01-23 NOTE — OCCUPATIONAL THERAPY INITIAL EVALUATION ADULT - GENERAL OBSERVATIONS, REHAB EVAL
Pt received semisupine in bed NAD, +IV, +tele, + johnson. Noted to have L upper arm swelling (RN Grayson Notified)

## 2022-01-23 NOTE — SWALLOW BEDSIDE ASSESSMENT ADULT - MUCOSAL QUALITY
Large purple/black lesion coating mucosal surface of soft palate, ?extending to oropharynx. RN and PA notified.

## 2022-01-23 NOTE — PROGRESS NOTE ADULT - SUBJECTIVE AND OBJECTIVE BOX
GASTROENTEROLOGY PROGRESS NOTE  Patient seen and examined at bedside. With small BMs overnight. Afib with RVR overnight, BPs in 100s, given 500 cc LR, converted to NSR early AM    ROS: Patient with no complaints, denying any abdominal pain, no nausea/vomiting. No fevers, chills. Patient does not recall if she had a BM.     PERTINENT REVIEW OF SYSTEMS:  CONSTITUTIONAL: No weakness, fevers or chills  HEENT: No visual changes; No vertigo or throat pain   GASTROINTESTINAL: As above.  NEUROLOGICAL: No numbness or weakness  SKIN: No itching, burning, rashes, or lesions     Allergies    No Known Allergies    Intolerances      MEDICATIONS:  MEDICATIONS  (STANDING):  aMIOdarone Infusion 0.501 mG/Min (16.7 mL/Hr) IV Continuous <Continuous>  azithromycin  IVPB 500 milliGRAM(s) IV Intermittent daily  cefTRIAXone   IVPB 1000 milliGRAM(s) IV Intermittent every 24 hours  chlorhexidine 2% Cloths 1 Application(s) Topical <User Schedule>  heparin   Injectable 5000 Unit(s) SubCutaneous every 12 hours  lactated ringers. 1000 milliLiter(s) (75 mL/Hr) IV Continuous <Continuous>  latanoprost 0.005% Ophthalmic Solution 1 Drop(s) Both EYES at bedtime    MEDICATIONS  (PRN):  OLANZapine Injectable 2.5 milliGRAM(s) IntraMuscular every 6 hours PRN agitation    Vital Signs Last 24 Hrs  T(C): 36.8 (23 Jan 2022 09:15), Max: 37.7 (22 Jan 2022 21:00)  T(F): 98.2 (23 Jan 2022 09:15), Max: 99.8 (22 Jan 2022 21:00)  HR: 105 (23 Jan 2022 12:00) (83 - 131)  BP: 107/57 (23 Jan 2022 12:00) (84/51 - 148/72)  BP(mean): 75 (23 Jan 2022 12:00) (63 - 100)  RR: 24 (23 Jan 2022 12:00) (9 - 25)  SpO2: 94% (23 Jan 2022 12:00) (92% - 99%)    01-22 @ 07:01  -  01-23 @ 07:00  --------------------------------------------------------  IN: 3000.8 mL / OUT: 882 mL / NET: 2118.8 mL    01-23 @ 07:01 - 01-23 @ 12:55  --------------------------------------------------------  IN: 366.8 mL / OUT: 105 mL / NET: 261.8 mL      PHYSICAL EXAM:    General: frail appearing female, lying in bed; in no acute distress  HEENT: MMM, conjunctiva and sclera clear  Gastrointestinal: Soft; mildly distended; non-tender; No rebound or guarding  Skin: Warm and dry. No obvious rash    LABS:                        8.8    9.05  )-----------( 250      ( 23 Jan 2022 05:36 )             27.9     01-23    142  |  104  |  9   ----------------------------<  89  3.1<L>   |  26  |  0.59    Ca    8.3<L>      23 Jan 2022 05:36  Phos  3.2     01-23  Mg     2.0     01-23    TPro  4.7<L>  /  Alb  2.4<L>  /  TBili  0.2  /  DBili  x   /  AST  11  /  ALT  <5<L>  /  AlkPhos  60  01-23                      Culture - Blood (collected 21 Jan 2022 00:53)  Source: .Blood Blood-Peripheral  Preliminary Report (22 Jan 2022 01:02):    No growth to date.    Culture - Blood (collected 21 Jan 2022 00:51)  Source: .Blood Blood-Peripheral  Preliminary Report (22 Jan 2022 01:02):    No growth to date.    Urinalysis with Rflx Culture (collected 20 Jan 2022 23:48)      RADIOLOGY & ADDITIONAL STUDIES:  Reviewed

## 2022-01-23 NOTE — OCCUPATIONAL THERAPY INITIAL EVALUATION ADULT - LEVEL OF INDEPENDENCE: GROOMING, OT EVAL
while sitting in chair 2/2 decrease activity tolerance and strength/moderate assist (50% patients effort)

## 2022-01-23 NOTE — OCCUPATIONAL THERAPY INITIAL EVALUATION ADULT - LIVES WITH, PROFILE
As per chart, pt is poor historian however reports she lives alone in apt with no FOZIA and is ind for ADLs and ambulates with cane. As per chart, pt resides in [place] (unclear if assistance is provided).

## 2022-01-23 NOTE — PROGRESS NOTE ADULT - ASSESSMENT
88yo female with afib (on Xarelto) dementia presents with weakness and poor PO intake. Exam significant for distention, CT with evidence of large bowel obstruction, narrowing of the rectum 9-12cm from the anal verge, differential includes malignancy vs infectious/inflammatory.     # Rectal stricture  mass vs inflammation  -Per Cardiology, patient considered intermediate risk for low-intermediate risk procedure  - Flex Sig (1/21/22); Solid stool seen in the rectum. An obstructive mass lesion seen in the rectosigmoid at ~10 cm from the anal verge that could not be traversed with the scope. A guidewire was passed through a pinhole opening and a uncovered 24 x 120 mm SEMS was deployed across the mass under fluoroscopic guidance. No contrast leak was observed after stent deployment.  - H/H remains stable  - NGT to low intermittent suction   - NPO for now   - Patient noted to have small BMs overnight, recommend initiating PO Miralax 17 g daily     Case discussed with [facility] attending and primary team.     Natali Sanchez DO  Gastroenterology Fellow  Pager: [phone]

## 2022-01-23 NOTE — PROGRESS NOTE ADULT - SUBJECTIVE AND OBJECTIVE BOX
SUBJECTIVE: Pt seen and examined by chief resident. Pt is doing well, resting comfortably on bed. Pain controlled. Per nurse bedside, pt has 2 BMs overnight, small in size. no nausea or vomiting.     Vital Signs Last 24 Hrs  T(C): 36.8 (23 Jan 2022 09:15), Max: 37.7 (22 Jan 2022 21:00)  T(F): 98.2 (23 Jan 2022 09:15), Max: 99.8 (22 Jan 2022 21:00)  HR: 99 (23 Jan 2022 11:00) (83 - 131)  BP: 110/57 (23 Jan 2022 11:00) (84/51 - 148/72)  BP(mean): 77 (23 Jan 2022 11:00) (63 - 100)  RR: 25 (23 Jan 2022 11:00) (9 - 27)  SpO2: 97% (23 Jan 2022 11:00) (92% - 99%)    I&O's Summary    22 Jan 2022 07:01  -  23 Jan 2022 07:00  --------------------------------------------------------  IN: 3000.8 mL / OUT: 882 mL / NET: 2118.8 mL    23 Jan 2022 07:01  -  23 Jan 2022 11:09  --------------------------------------------------------  IN: 366.8 mL / OUT: 90 mL / NET: 276.8 mL        Physical Exam:  General Appearance: Appears well, NAD  Pulmonary: Nonlabored breathing, no respiratory distress  Cardiovascular: NSR  Abdomen: Soft, nondisteded, nontender  Extremities: WWP, SCD's in place     LABS:                        8.8    9.05  )-----------( 250      ( 23 Jan 2022 05:36 )             27.9     01-23    142  |  104  |  9   ----------------------------<  89  3.1<L>   |  26  |  0.59    Ca    8.3<L>      23 Jan 2022 05:36  Phos  3.2     01-23  Mg     2.0     01-23    TPro  4.7<L>  /  Alb  2.4<L>  /  TBili  0.2  /  DBili  x   /  AST  11  /  ALT  <5<L>  /  AlkPhos  60  01-23

## 2022-01-23 NOTE — PHYSICAL THERAPY INITIAL EVALUATION ADULT - ADDITIONAL COMMENTS
has rolling walker at home, history of falls, reports living in apartment, elevator, no steps, however patient poor historian

## 2022-01-23 NOTE — PHYSICAL THERAPY INITIAL EVALUATION ADULT - GENERAL OBSERVATIONS, REHAB EVAL
Received supine denies pain +EKG, IV, johnson. Left in chair +linesintalicia, RN Grayson aware, call johnson

## 2022-01-23 NOTE — OCCUPATIONAL THERAPY INITIAL EVALUATION ADULT - RANGE OF MOTION EXAMINATION, UPPER EXTREMITY
Noted to have increase L upper arm swelling (RN Grayson Notified)/bilateral UE Active ROM was WFL  (within functional limits)

## 2022-01-23 NOTE — PROGRESS NOTE ADULT - ATTENDING COMMENTS
88yo female with afib (on Xarelto) dementia presents with weakness and poor PO intake. Exam significant for distention, CT with evidence of large bowel obstruction, narrowing of the rectum 9-12cm from the anal verge, differential includes malignancy vs infectious/inflammatory.     # Rectal stricture  mass vs inflammation  -Per Cardiology, patient considered intermediate risk for low-intermediate risk procedure  - Flex Sig (1/21/22); Solid stool seen in the rectum. An obstructive mass lesion seen in the rectosigmoid at ~10 cm from the anal verge that could not be traversed with the scope. A guidewire was passed through a pinhole opening and a uncovered 24 x 120 mm SEMS was deployed across the mass under fluoroscopic guidance. No contrast leak was observed after stent deployment.  - H/H remains stable  - NGT to low intermittent suction   - NPO for now   - Patient noted to have small BMs overnight, recommend initiating PO Miralax 17 g daily

## 2022-01-23 NOTE — PROGRESS NOTE ADULT - ASSESSMENT
86 yo female PMH of dementia, Afib (Xarelto, Last taken Tuesday) PPM (Indicated for tachy-yudith syndrome), cerebral LICA paraclinoid aneurysm (s/p stenting). Admitted for abdominal pain and 5 days of poor to none oral intake. CT scan done during this admission shows large bowel obstruction, narrowing of the rectum 9-12cm from the anal verge. Impression is large bowel obstruction, differential includes malignancy vs infectious/inflammatory. S/p Afib with RVR, home betablocker not restarted. Required multiple pushes of betablocker IV, calcium channel blocker and amiodarone.  Asymptomatic and hemodynamically stable, no need of pressors. Abdomen non-peritonitic s/p stent placement by GI. Having bowel function, no n/v    NEURO:  Zyprexa 2.5 q6 prn agitation.   HEENT: latanoprost   CV: goal MAP >65. Paaroxysmal Afib with Episode of RVR now resolved: Cont Amiodarone gtt. Holding Xarelto (C2V 3-4). Cardiology c/s appreciated   PULM: Goran>94%CAP: Cont Abx will get Chest CT to complete CA staging,   GI/FEN: Failure to thrive poss start TPN, Obstructing Rectal mass s/p Sigmoidoscopy with stent placement NGT, NPO, LR@75.   : Reyes   ENDO: ISS  HEME: Holding Xarelto will need Chest CT to complete staging  ID: CAP: cont Ceftriaxone(1/21-) , Azithromycin (1/21- )  PPx: SCD/SQH   LINES: PIV   WOUNDS/DRAINS: None   PT/OT: Not ordered   Dispo: Palliative care following cont SICU, Full code

## 2022-01-23 NOTE — PROGRESS NOTE ADULT - SUBJECTIVE AND OBJECTIVE BOX
24 hr events:  ON: converted back to Afib with RVR, 's, 's systolic, Rectal 98.8; given 150 amio bolus, Continues to be in AFib with rate control (100's); low pressure given LR 500cc, Converted to sinus ryhtym at 6am, Repleted electrolytes   1/22: PT/OT ordered, started SQH, no rectal tube per Penzer, CT Chest _, Venous duplex, no DVT; Per cards continue Amio gtt @ 0.5mg/min     SUBJECTIVE: Resting comfortably in bed. No n/v, wants to eat. Having bowel movements after stent placement.       MEDICATIONS  (STANDING):  aMIOdarone Infusion 0.501 mG/Min (16.7 mL/Hr) IV Continuous <Continuous>  azithromycin  IVPB 500 milliGRAM(s) IV Intermittent daily  cefTRIAXone   IVPB 1000 milliGRAM(s) IV Intermittent every 24 hours  chlorhexidine 2% Cloths 1 Application(s) Topical <User Schedule>  heparin   Injectable 5000 Unit(s) SubCutaneous every 12 hours  lactated ringers. 1000 milliLiter(s) (75 mL/Hr) IV Continuous <Continuous>  latanoprost 0.005% Ophthalmic Solution 1 Drop(s) Both EYES at bedtime  potassium chloride  20 mEq/100 mL IVPB 20 milliEquivalent(s) IV Intermittent every 2 hours    MEDICATIONS  (PRN):  OLANZapine Injectable 2.5 milliGRAM(s) IntraMuscular every 6 hours PRN agitation    ICU Vital Signs Last 24 Hrs  T(C): 36.8 (23 Jan 2022 09:15), Max: 37.7 (22 Jan 2022 21:00)  T(F): 98.2 (23 Jan 2022 09:15), Max: 99.8 (22 Jan 2022 21:00)  HR: 87 (23 Jan 2022 10:00) (83 - 131)  BP: 113/59 (23 Jan 2022 10:00) (84/51 - 136/62)  BP(mean): 83 (23 Jan 2022 10:00) (63 - 91)  ABP: --  ABP(mean): --  RR: 22 (23 Jan 2022 10:00) (9 - 27)  SpO2: 97% (23 Jan 2022 10:00) (92% - 99%)      Physical Exam:  General: NAD  HEENT: NC/AT, EOMI, PERRLA, normal hearing, no oral lesions, neck supple w/o LAD  Pulmonary: Nonlabored breathing, no respiratory distress, CTA-B  Cardiovascular: NSR, no murmurs  Abdominal: soft, NT/ND, +BS, no organomegaly  Extremities: WWP, 5/5 strength x 4, no clubbing/cyanosis/edema  Neuro: A/O x3, CNs II-XII grossly intact, normal motor/sensation, no focal deficits  Pulses: palpable distal pulses    Lines/tubes/drains:    Vent settings:      I&O's Summary    22 Jan 2022 07:01  -  23 Jan 2022 07:00  --------------------------------------------------------  IN: 3000.8 mL / OUT: 882 mL / NET: 2118.8 mL    23 Jan 2022 07:01  -  23 Jan 2022 10:17  --------------------------------------------------------  IN: 275.1 mL / OUT: 65 mL / NET: 210.1 mL        LABS:                        8.8    9.05  )-----------( 250      ( 23 Jan 2022 05:36 )             27.9     01-23    142  |  104  |  9   ----------------------------<  89  3.1<L>   |  26  |  0.59    Ca    8.3<L>      23 Jan 2022 05:36  Phos  3.2     01-23  Mg     2.0     01-23    TPro  4.7<L>  /  Alb  2.4<L>  /  TBili  0.2  /  DBili  x   /  AST  11  /  ALT  <5<L>  /  AlkPhos  60  01-23        CAPILLARY BLOOD GLUCOSE        LIVER FUNCTIONS - ( 23 Jan 2022 05:36 )  Alb: 2.4 g/dL / Pro: 4.7 g/dL / ALK PHOS: 60 U/L / ALT: <5 U/L / AST: 11 U/L / GGT: x             Cultures:    RADIOLOGY & ADDITIONAL STUDIES:     24 hr events:  ON: converted back to Afib with RVR, 's, 's systolic, Rectal 98.8; given 150 amio bolus, Continues to be in AFib with rate control (100's); low pressure given LR 500cc, Converted to sinus ryhtym at 6am, Repleted electrolytes   1/22: PT/OT ordered, started SQH, no rectal tube per Penzer, CT Chest _, Venous duplex, no DVT; Per cards continue Amio gtt @ 0.5mg/min     SUBJECTIVE: Resting comfortably in bed. No n/v, wants to eat. Having bowel movements after stent placement.       MEDICATIONS  (STANDING):  aMIOdarone Infusion 0.501 mG/Min (16.7 mL/Hr) IV Continuous <Continuous>  azithromycin  IVPB 500 milliGRAM(s) IV Intermittent daily  cefTRIAXone   IVPB 1000 milliGRAM(s) IV Intermittent every 24 hours  chlorhexidine 2% Cloths 1 Application(s) Topical <User Schedule>  heparin   Injectable 5000 Unit(s) SubCutaneous every 12 hours  lactated ringers. 1000 milliLiter(s) (75 mL/Hr) IV Continuous <Continuous>  latanoprost 0.005% Ophthalmic Solution 1 Drop(s) Both EYES at bedtime  potassium chloride  20 mEq/100 mL IVPB 20 milliEquivalent(s) IV Intermittent every 2 hours    MEDICATIONS  (PRN):  OLANZapine Injectable 2.5 milliGRAM(s) IntraMuscular every 6 hours PRN agitation    ICU Vital Signs Last 24 Hrs  T(C): 36.8 (23 Jan 2022 09:15), Max: 37.7 (22 Jan 2022 21:00)  T(F): 98.2 (23 Jan 2022 09:15), Max: 99.8 (22 Jan 2022 21:00)  HR: 87 (23 Jan 2022 10:00) (83 - 131)  BP: 113/59 (23 Jan 2022 10:00) (84/51 - 136/62)  BP(mean): 83 (23 Jan 2022 10:00) (63 - 91)  ABP: --  ABP(mean): --  RR: 22 (23 Jan 2022 10:00) (9 - 27)  SpO2: 97% (23 Jan 2022 10:00) (92% - 99%)      Physical Exam:  General: NAD  HEENT: NC/AT, EOMI, PERRLA, normal hearing  Pulmonary: Nonlabored breathing, no respiratory distress  Cardiovascular: A fib  Abdominal: soft, NT/ND, no rebound or guarding   Extremities: WWP, 5/5 strength x 4, no clubbing/cyanosis/edema  Neuro: A/O x2, no focal deficits  Pulses: palpable distal pulses      Lines/tubes/drains:    Vent settings:      I&O's Summary    22 Jan 2022 07:01  -  23 Jan 2022 07:00  --------------------------------------------------------  IN: 3000.8 mL / OUT: 882 mL / NET: 2118.8 mL    23 Jan 2022 07:01  -  23 Jan 2022 10:17  --------------------------------------------------------  IN: 275.1 mL / OUT: 65 mL / NET: 210.1 mL        LABS:                        8.8    9.05  )-----------( 250      ( 23 Jan 2022 05:36 )             27.9     01-23    142  |  104  |  9   ----------------------------<  89  3.1<L>   |  26  |  0.59    Ca    8.3<L>      23 Jan 2022 05:36  Phos  3.2     01-23  Mg     2.0     01-23    TPro  4.7<L>  /  Alb  2.4<L>  /  TBili  0.2  /  DBili  x   /  AST  11  /  ALT  <5<L>  /  AlkPhos  60  01-23        CAPILLARY BLOOD GLUCOSE        LIVER FUNCTIONS - ( 23 Jan 2022 05:36 )  Alb: 2.4 g/dL / Pro: 4.7 g/dL / ALK PHOS: 60 U/L / ALT: <5 U/L / AST: 11 U/L / GGT: x             Cultures:    RADIOLOGY & ADDITIONAL STUDIES:

## 2022-01-23 NOTE — SWALLOW BEDSIDE ASSESSMENT ADULT - SWALLOW EVAL: DIAGNOSIS
Pt presented with a functional oropharyngeal swallow with thin liquid/clears only. Given general weakness/debility, lethargy, and hx dementia, aspiration precautions in place. No further SLP intervention is warranted at this time; please re-consult as needed.

## 2022-01-23 NOTE — OCCUPATIONAL THERAPY INITIAL EVALUATION ADULT - DIAGNOSIS, OT EVAL
Pt appears lethargic, however able to respond and participated throughout OT session, demonstrating decrease in balance, strength and activity tolerance affecting ADLs and functional mobility.

## 2022-01-23 NOTE — PROGRESS NOTE ADULT - SUBJECTIVE AND OBJECTIVE BOX
Attending Surgeon Progress Note (Covering for Dr. Pérez)    STATUS POST:  placement of colonic stent for obstructing proximal rectal mass  POST Procedure DAY #:  2    SUBJECTIVE: feels comfortable, hungry    Bowel movement: Yes according to nursing staff     PHYSICAL EXAM:  Drowsy, arousable, partially oriented  Lungs:  decreased breath sounds at the R base  Cor:  irreg irreg  Abdomen:  soft, nondistended, nontener, +bowel sounds, no guarding  Ext:  no edema, well-perfused

## 2022-01-24 LAB
ALBUMIN SERPL ELPH-MCNC: 2.3 G/DL — LOW (ref 3.3–5)
ALP SERPL-CCNC: 64 U/L — SIGNIFICANT CHANGE UP (ref 40–120)
ALT FLD-CCNC: 6 U/L — LOW (ref 10–45)
ANION GAP SERPL CALC-SCNC: 12 MMOL/L — SIGNIFICANT CHANGE UP (ref 5–17)
AST SERPL-CCNC: 13 U/L — SIGNIFICANT CHANGE UP (ref 10–40)
BILIRUB SERPL-MCNC: 0.2 MG/DL — SIGNIFICANT CHANGE UP (ref 0.2–1.2)
BLD GP AB SCN SERPL QL: NEGATIVE — SIGNIFICANT CHANGE UP
BUN SERPL-MCNC: 6 MG/DL — LOW (ref 7–23)
CALCIUM SERPL-MCNC: 8.5 MG/DL — SIGNIFICANT CHANGE UP (ref 8.4–10.5)
CHLORIDE SERPL-SCNC: 103 MMOL/L — SIGNIFICANT CHANGE UP (ref 96–108)
CO2 SERPL-SCNC: 23 MMOL/L — SIGNIFICANT CHANGE UP (ref 22–31)
CREAT SERPL-MCNC: 0.58 MG/DL — SIGNIFICANT CHANGE UP (ref 0.5–1.3)
GLUCOSE SERPL-MCNC: 90 MG/DL — SIGNIFICANT CHANGE UP (ref 70–99)
HCT VFR BLD CALC: 26.9 % — LOW (ref 34.5–45)
HGB BLD-MCNC: 8.5 G/DL — LOW (ref 11.5–15.5)
MAGNESIUM SERPL-MCNC: 1.7 MG/DL — SIGNIFICANT CHANGE UP (ref 1.6–2.6)
MCHC RBC-ENTMCNC: 29.4 PG — SIGNIFICANT CHANGE UP (ref 27–34)
MCHC RBC-ENTMCNC: 31.6 GM/DL — LOW (ref 32–36)
MCV RBC AUTO: 93.1 FL — SIGNIFICANT CHANGE UP (ref 80–100)
NRBC # BLD: 0 /100 WBCS — SIGNIFICANT CHANGE UP (ref 0–0)
PHOSPHATE SERPL-MCNC: 3 MG/DL — SIGNIFICANT CHANGE UP (ref 2.5–4.5)
PLATELET # BLD AUTO: 252 K/UL — SIGNIFICANT CHANGE UP (ref 150–400)
POTASSIUM SERPL-MCNC: 2.6 MMOL/L — CRITICAL LOW (ref 3.5–5.3)
POTASSIUM SERPL-SCNC: 2.6 MMOL/L — CRITICAL LOW (ref 3.5–5.3)
PROT SERPL-MCNC: 4.7 G/DL — LOW (ref 6–8.3)
RBC # BLD: 2.89 M/UL — LOW (ref 3.8–5.2)
RBC # FLD: 14.3 % — SIGNIFICANT CHANGE UP (ref 10.3–14.5)
RH IG SCN BLD-IMP: POSITIVE — SIGNIFICANT CHANGE UP
SODIUM SERPL-SCNC: 138 MMOL/L — SIGNIFICANT CHANGE UP (ref 135–145)
TRIGL SERPL-MCNC: 93 MG/DL — SIGNIFICANT CHANGE UP
WBC # BLD: 8.31 K/UL — SIGNIFICANT CHANGE UP (ref 3.8–10.5)
WBC # FLD AUTO: 8.31 K/UL — SIGNIFICANT CHANGE UP (ref 3.8–10.5)

## 2022-01-24 PROCEDURE — 36573 INSJ PICC RS&I 5 YR+: CPT

## 2022-01-24 PROCEDURE — 99232 SBSQ HOSP IP/OBS MODERATE 35: CPT | Mod: GC

## 2022-01-24 PROCEDURE — 99233 SBSQ HOSP IP/OBS HIGH 50: CPT

## 2022-01-24 PROCEDURE — 99358 PROLONG SERVICE W/O CONTACT: CPT

## 2022-01-24 PROCEDURE — 76937 US GUIDE VASCULAR ACCESS: CPT | Mod: 26,59

## 2022-01-24 RX ORDER — ELECTROLYTE SOLUTION,INJ
1 VIAL (ML) INTRAVENOUS
Refills: 0 | Status: DISCONTINUED | OUTPATIENT
Start: 2022-01-24 | End: 2022-01-24

## 2022-01-24 RX ORDER — SODIUM CHLORIDE 9 MG/ML
10 INJECTION INTRAMUSCULAR; INTRAVENOUS; SUBCUTANEOUS
Refills: 0 | Status: DISCONTINUED | OUTPATIENT
Start: 2022-01-24 | End: 2022-02-04

## 2022-01-24 RX ORDER — POTASSIUM CHLORIDE 20 MEQ
40 PACKET (EA) ORAL ONCE
Refills: 0 | Status: COMPLETED | OUTPATIENT
Start: 2022-01-24 | End: 2022-01-24

## 2022-01-24 RX ORDER — SODIUM CHLORIDE 9 MG/ML
1000 INJECTION, SOLUTION INTRAVENOUS
Refills: 0 | Status: DISCONTINUED | OUTPATIENT
Start: 2022-01-24 | End: 2022-01-24

## 2022-01-24 RX ORDER — I.V. FAT EMULSION 20 G/100ML
1.19 EMULSION INTRAVENOUS
Qty: 50 | Refills: 0 | Status: DISCONTINUED | OUTPATIENT
Start: 2022-01-24 | End: 2022-01-24

## 2022-01-24 RX ORDER — SODIUM CHLORIDE 9 MG/ML
1000 INJECTION, SOLUTION INTRAVENOUS ONCE
Refills: 0 | Status: COMPLETED | OUTPATIENT
Start: 2022-01-24 | End: 2022-01-24

## 2022-01-24 RX ORDER — POTASSIUM CHLORIDE 20 MEQ
10 PACKET (EA) ORAL
Refills: 0 | Status: COMPLETED | OUTPATIENT
Start: 2022-01-24 | End: 2022-01-24

## 2022-01-24 RX ORDER — MIDAZOLAM HYDROCHLORIDE 1 MG/ML
2 INJECTION, SOLUTION INTRAMUSCULAR; INTRAVENOUS ONCE
Refills: 0 | Status: DISCONTINUED | OUTPATIENT
Start: 2022-01-24 | End: 2022-01-24

## 2022-01-24 RX ORDER — MAGNESIUM SULFATE 500 MG/ML
2 VIAL (ML) INJECTION ONCE
Refills: 0 | Status: COMPLETED | OUTPATIENT
Start: 2022-01-24 | End: 2022-01-24

## 2022-01-24 RX ADMIN — Medication 100 MILLIEQUIVALENT(S): at 07:30

## 2022-01-24 RX ADMIN — Medication 1 EACH: at 18:26

## 2022-01-24 RX ADMIN — HEPARIN SODIUM 5000 UNIT(S): 5000 INJECTION INTRAVENOUS; SUBCUTANEOUS at 06:11

## 2022-01-24 RX ADMIN — Medication 100 MILLIEQUIVALENT(S): at 08:49

## 2022-01-24 RX ADMIN — HEPARIN SODIUM 5000 UNIT(S): 5000 INJECTION INTRAVENOUS; SUBCUTANEOUS at 18:27

## 2022-01-24 RX ADMIN — Medication 25 GRAM(S): at 06:35

## 2022-01-24 RX ADMIN — Medication 40 MILLIEQUIVALENT(S): at 09:24

## 2022-01-24 RX ADMIN — LATANOPROST 1 DROP(S): 0.05 SOLUTION/ DROPS OPHTHALMIC; TOPICAL at 22:05

## 2022-01-24 RX ADMIN — CHLORHEXIDINE GLUCONATE 1 APPLICATION(S): 213 SOLUTION TOPICAL at 06:11

## 2022-01-24 RX ADMIN — SODIUM CHLORIDE 4000 MILLILITER(S): 9 INJECTION, SOLUTION INTRAVENOUS at 10:03

## 2022-01-24 RX ADMIN — CEFTRIAXONE 100 MILLIGRAM(S): 500 INJECTION, POWDER, FOR SOLUTION INTRAMUSCULAR; INTRAVENOUS at 13:07

## 2022-01-24 RX ADMIN — Medication 100 MILLIEQUIVALENT(S): at 06:35

## 2022-01-24 RX ADMIN — I.V. FAT EMULSION 21 GM/KG/DAY: 20 EMULSION INTRAVENOUS at 22:07

## 2022-01-24 RX ADMIN — POLYETHYLENE GLYCOL 3350 17 GRAM(S): 17 POWDER, FOR SOLUTION ORAL at 12:02

## 2022-01-24 NOTE — PROGRESS NOTE ADULT - SUBJECTIVE AND OBJECTIVE BOX
24 hr events:  : LR 1L bolus. Repleted K and Mg. TPN. Azithro d/c .TPN ordered. PICC placed; SDU   ON: had continued low UOP tried another 500cc then stopped bolusing, looks edematous on exam    SUBJECTIVE: Resting comfortably in bed. No n/v, wants to eat. Having bowel movements after stent placement.       PAST MEDICAL & SURGICAL HISTORY:  Glaucoma    Osteoarthritis    Syncope and collapse    PVC (premature ventricular contraction)    Cerebral aneurysm    History of loop recorder    History of cholecystectomy      Allergies    No Known Allergies    Intolerances      MEDICATIONS  (STANDING):  aMIOdarone Infusion 0.501 mG/Min (16.7 mL/Hr) IV Continuous <Continuous>  azithromycin  IVPB 500 milliGRAM(s) IV Intermittent daily  cefTRIAXone   IVPB 1000 milliGRAM(s) IV Intermittent every 24 hours  chlorhexidine 2% Cloths 1 Application(s) Topical <User Schedule>  fat emulsion (Fish Oil and Plant Based) 20% Infusion 1.19 Gm/kG/Day (21 mL/Hr) IV Continuous <Continuous>  heparin   Injectable 5000 Unit(s) SubCutaneous every 12 hours  lactated ringers. 1000 milliLiter(s) (75 mL/Hr) IV Continuous <Continuous>  latanoprost 0.005% Ophthalmic Solution 1 Drop(s) Both EYES at bedtime  Parenteral Nutrition - Adult 1 Each (50 mL/Hr) TPN Continuous <Continuous>  polyethylene glycol 3350 17 Gram(s) Oral daily    MEDICATIONS  (PRN):  OLANZapine Injectable 2.5 milliGRAM(s) IntraMuscular every 6 hours PRN agitation  sodium chloride 0.9% lock flush 10 milliLiter(s) IV Push every 1 hour PRN Pre/post blood products, medications, blood draw, and to maintain line patency        Physical Exam:   General: Well apperaing, resting comfortably in bed in no acute distress   Neuro: Grossly intact bilaterally   HEENT: Normocephalic, atraumatic, trachea midline, no JVD   Chest:   Heart: Regular S1/S2, no murmurs rubs or gallops   Lungs: Unlabored breathing on ***; Clear to auscultation bilaterally, no adventitious sounds   Abdomen: Soft, non-distended, normoactive bowel sounds throughout, no tenderness to palpation in all 4 quadrants   Upper Extremities: No edema, freely mobile bilaterally   Lower Extremities: No edema, SCDs in place, feet warm bilaterally   Skin: Warm, non-diaphoretic throughout       Labs:                         8.5    8.31  )-----------( 252      ( 2022 05:44 )             26.9         138  |  103  |  6<L>  ----------------------------<  90  2.6<LL>   |  23  |  0.58    Ca    8.5      2022 05:44  Phos  3.0       Mg     1.7         TPro  4.7<L>  /  Alb  2.3<L>  /  TBili  0.2  /  DBili  x   /  AST  13  /  ALT  6<L>  /  AlkPhos  64      CAPILLARY BLOOD GLUCOSE              SARS-CoV-2: NotDetec (2022 15:38)  COVID-19 PCR: NotDetec (2022 14:10)          Vital Signs:   Vital Signs Last 24 Hrs  T(C): 36.8 (2022 10:13), Max: 36.8 (2022 10:13)  T(F): 98.2 (2022 10:13), Max: 98.2 (2022 10:13)  HR: 88 (2022 11:00) (75 - 111)  BP: 132/60 (2022 11:00) (100/57 - 164/70)  BP(mean): 87 (2022 11:00) (73 - 100)  RR: 19 (2022 11:00) (14 - 29)  SpO2: 96% (2022 11:00) (94% - 100%)      Input/Output:   I&O's Detail    2022 07:01  -  2022 07:00  --------------------------------------------------------  IN:    Amiodarone: 400.8 mL    IV PiggyBack: 50 mL    IV PiggyBack: 200 mL    Lactated Ringers: 1725 mL    Lactated Ringers Bolus: 1000 mL  Total IN: 3375.8 mL    OUT:    Indwelling Catheter - Urethral (mL): 310 mL  Total OUT: 310 mL    Total NET: 3065.8 mL      2022 07:01  -  2022 11:55  --------------------------------------------------------  IN:    Amiodarone: 66.8 mL    Lactated Ringers: 300 mL    Lactated Ringers Bolus: 1000 mL  Total IN: 1366.8 mL    OUT:    Indwelling Catheter - Urethral (mL): 63 mL  Total OUT: 63 mL    Total NET: 1303.8 mL        Daily     Daily Weight in k.2 (2022 05:30)     24 hr events:  : LR 1L bolus. Repleted K and Mg. TPN. Azithro d/c .TPN ordered. PICC placed; SDU   ON: had continued low UOP tried another 500cc then stopped bolusing, looks edematous on exam    SUBJECTIVE: Resting comfortably in bed. No n/v, wants to eat. Having bowel movements after stent placement.       PAST MEDICAL & SURGICAL HISTORY:  Glaucoma    Osteoarthritis    Syncope and collapse    PVC (premature ventricular contraction)    Cerebral aneurysm    History of loop recorder    History of cholecystectomy      Allergies    No Known Allergies    Intolerances      MEDICATIONS  (STANDING):  aMIOdarone Infusion 0.501 mG/Min (16.7 mL/Hr) IV Continuous <Continuous>  azithromycin  IVPB 500 milliGRAM(s) IV Intermittent daily  cefTRIAXone   IVPB 1000 milliGRAM(s) IV Intermittent every 24 hours  chlorhexidine 2% Cloths 1 Application(s) Topical <User Schedule>  fat emulsion (Fish Oil and Plant Based) 20% Infusion 1.19 Gm/kG/Day (21 mL/Hr) IV Continuous <Continuous>  heparin   Injectable 5000 Unit(s) SubCutaneous every 12 hours  lactated ringers. 1000 milliLiter(s) (75 mL/Hr) IV Continuous <Continuous>  latanoprost 0.005% Ophthalmic Solution 1 Drop(s) Both EYES at bedtime  Parenteral Nutrition - Adult 1 Each (50 mL/Hr) TPN Continuous <Continuous>  polyethylene glycol 3350 17 Gram(s) Oral daily    MEDICATIONS  (PRN):  OLANZapine Injectable 2.5 milliGRAM(s) IntraMuscular every 6 hours PRN agitation  sodium chloride 0.9% lock flush 10 milliLiter(s) IV Push every 1 hour PRN Pre/post blood products, medications, blood draw, and to maintain line patency        Physical Exam:   General: Well apperaing, resting comfortably in bed in no acute distress   Neuro: Grossly intact bilaterally   HEENT: Normocephalic, atraumatic, trachea midline, no JVD   Heart: Regular S1/S2, no murmurs rubs or gallops   Lungs: Unlabored breathing on ***; Clear to auscultation bilaterally, no adventitious sounds   Abdomen: Soft, non-distended, normoactive bowel sounds throughout, no tenderness to palpation in all 4 quadrants   Upper Extremities: No edema, freely mobile bilaterally   Lower Extremities: No edema, SCDs in place, feet warm bilaterally   Skin: Warm, non-diaphoretic throughout       Labs:                         8.5    8.31  )-----------( 252      ( 2022 05:44 )             26.9         138  |  103  |  6<L>  ----------------------------<  90  2.6<LL>   |  23  |  0.58    Ca    8.5      2022 05:44  Phos  3.0       Mg     1.7         TPro  4.7<L>  /  Alb  2.3<L>  /  TBili  0.2  /  DBili  x   /  AST  13  /  ALT  6<L>  /  AlkPhos  64      CAPILLARY BLOOD GLUCOSE              SARS-CoV-2: NotDetec (2022 15:38)  COVID-19 PCR: NotDetec (2022 14:10)          Vital Signs:   Vital Signs Last 24 Hrs  T(C): 36.8 (2022 10:13), Max: 36.8 (2022 10:13)  T(F): 98.2 (2022 10:13), Max: 98.2 (2022 10:13)  HR: 88 (2022 11:00) (75 - 111)  BP: 132/60 (2022 11:00) (100/57 - 164/70)  BP(mean): 87 (2022 11:00) (73 - 100)  RR: 19 (2022 11:00) (14 - 29)  SpO2: 96% (2022 11:00) (94% - 100%)      Input/Output:   I&O's Detail    2022 07:01  -  2022 07:00  --------------------------------------------------------  IN:    Amiodarone: 400.8 mL    IV PiggyBack: 50 mL    IV PiggyBack: 200 mL    Lactated Ringers: 1725 mL    Lactated Ringers Bolus: 1000 mL  Total IN: 3375.8 mL    OUT:    Indwelling Catheter - Urethral (mL): 310 mL  Total OUT: 310 mL    Total NET: 3065.8 mL      2022 07:01  -  2022 11:55  --------------------------------------------------------  IN:    Amiodarone: 66.8 mL    Lactated Ringers: 300 mL    Lactated Ringers Bolus: 1000 mL  Total IN: 1366.8 mL    OUT:    Indwelling Catheter - Urethral (mL): 63 mL  Total OUT: 63 mL    Total NET: 1303.8 mL        Daily     Daily Weight in k.2 (2022 05:30)

## 2022-01-24 NOTE — PROGRESS NOTE ADULT - ATTENDING COMMENTS
Pt d/w fellow and seen this evening.  S/p colonic stent on Friday.  Abdomen is soft, nontender.  Will continue to monitor.

## 2022-01-24 NOTE — PROGRESS NOTE ADULT - ASSESSMENT
88yo female with afib (on Xarelto) dementia presents with weakness and poor PO intake. Exam significant for distention, CT with evidence of large bowel obstruction, narrowing of the rectum 9-12cm from the anal verge, differential includes malignancy vs infectious/inflammatory.     # Rectal stricture  s/p Flex Sig (1/21/22); An obstructive mass lesion seen in the rectosigmoid at ~10 cm s/p placement of uncovered 24 x 120 mm SEMS  - abdomen decompressed after stent placement with good BMs  - c/w miralax  - Depending on surgical plan may plan for a complete colonoscopic evaluation prior to surgery    Case discussed with adv attending and primary team.     Recommendations discussed with primary team    Grant Kenney MD  PGY-5 GI fellow  Pager: [phone]

## 2022-01-24 NOTE — CONSULT NOTE ADULT - CRITICAL CARE SERVICES PROVIDED
Critical care services provided Burow's Graft Text: The defect edges were debeveled with a #15 scalpel blade.  Given the location of the defect, shape of the defect, the proximity to free margins and the presence of a standing cone deformity a Burow's skin graft was deemed most appropriate. The standing cone was removed and this tissue was then trimmed to the shape of the primary defect. The adipose tissue was also removed until only dermis and epidermis were left.  The skin margins of the secondary defect were undermined to an appropriate distance in all directions utilizing iris scissors.  The secondary defect was closed with interrupted buried subcutaneous sutures.  The skin edges were then re-apposed with running  sutures.  The skin graft was then placed in the primary defect and oriented appropriately.

## 2022-01-24 NOTE — PROGRESS NOTE ADULT - SUBJECTIVE AND OBJECTIVE BOX
Feels well, passing BMs.  No pain or nausea.  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  Afib with RVR controlled. RLL pneumonia. Malnutrition.  1. On Ceftriaxone/azithromycin  2. Chest CT read pending.  3. Continue clear liquid diet.  Ensure tid.  Miralax daily  4. OK for stepdown  5. She does not seem to have metastatic disease.  She is a candidate for curative operation, but she is at very high risk.  May be best to delay operation until she is more medically stable now that her acute obstruction has resolved..  Need to have a goals of care discussion with her daughter.

## 2022-01-24 NOTE — PROGRESS NOTE ADULT - ATTENDING COMMENTS
AF after rectal stent placement with possible pna.  Now stable on amiodarone  DC azithromycin, continue ceftriaxone  Advance diet as tolerated with cardiology following  Cna transfer out of ICU.

## 2022-01-24 NOTE — PROGRESS NOTE ADULT - ASSESSMENT
86 yo female PMH of dementia, Afib (Xarelto, Last taken Tuesday) PPM (Indicated for tachy-yudith syndrome), cerebral LICA paraclinoid aneurysm (s/p stenting). Admitted for abdominal pain and 5 days of poor to none oral intake. CT scan done during this admission shows large bowel obstruction, narrowing of the rectum 9-12cm from the anal verge. Impression is large bowel obstruction, differential includes malignancy vs infectious/inflammatory. Today went to Afib with RVR, home betablocker not restarted. Required multiple pushes of betablocker IV, calcium channel blocker and amiodarone.  Asymptomatic and hemodynamically stable, no need of pressors. Abdomen non-peritonitic. Hemoglobin decreased 2 units. Possible  GI bleed from mass vs chronic disease. Stepped down today.    NEURO:  Zyprexa 2.5 q6 prn agitation.   HEENT: latanoprost   CV: goal MAP >65. Paaroxysmal Afib with Episode of RVR now resolved: Cont Amiodarone gtt. Holding Xarelto (C2V 3-4). Cardiology c/s appretiated.   PULM: Goran>94%CAP: Cont Abx will get Chest CT to complete CA staging,   GI/FEN: FTT, PICC/TPN Monday, Obstructing Rectal mass s/p Sigmoidoscopy with stent placement, NPO w/ sips/chips/meds, LR@75, miralax   : Reyes   ENDO: ISS  HEME: Holding Xarelto will need Chest CT to complete staging  ID: CAP: cont Ceftriaxone(1/21-) , Azithromycin (1/21-1/24 )  PPx: SCD/SQH   LINES: PIVs, R basilic PICC (1/24--)  WOUNDS/DRAINS: None   PT/OT: Not ordered   Dispo: Palliative care following cont SICU, Full code

## 2022-01-24 NOTE — PROGRESS NOTE ADULT - ASSESSMENT
86 yo female with Pmhx of Afib (on Xarelto) with SN dysfunction s/p PPM, dementia presents with failure to thrive with weakness and poor PO intake found to have a large bowel obstruction, narrowing of the rectum 9-12cm from the anal verge, s/p Flex/SIg + Stenting with course notable for Afib with RVR now in NSR    1) Atrial Fibrillation with RVR in the setting of large bowel obstruction  -Patient with known Hx of Afib on Xarelto, however with active Eliquis prescription as well, here with large bowel obstruction  -Pt admitted with Afib RVR with -160 in NSR after amiodorone GTT infusion  -Echo showing preserved BIV function without valvular pathology  -Cont amio drip @ 0.5mg/min for now, can transition to amio 400 mg po bid when able to tolerate po to complete 10G load  -Would resumed AC when cleared by primary team given CHADSVASC2  = 3. Currently no need for bridging with IV heparin while holding AC  -Palliative consulted for further GOC discussion  -Cardiology will continue to follow post op. Please call with any questions

## 2022-01-24 NOTE — PROGRESS NOTE ADULT - ATTENDING COMMENTS
Initial attending contact date 1/21/22 . See fellow note written above for details. I reviewed the fellow documentation. I have personally seen and examined this patient. I reviewed vitals, labs, medications, cardiac studies, and additional imaging. I agree with the above fellow's findings and plans as written above with the following additions/statements.    86 yo female with Pmhx of Afib (on Xarelto) with SN dysfunction s/p PPM, dementia presents with failure to thrive with weakness and poor PO intake found to have large bowel obstruction found to be in Afib with RVR. Cardiology consulted for pre-op cardiac risk assessment  -now s/p endoscopic stent  -On tele: maintaining NSR  -Cont amio drip @ . 5mg/min for now, can transition to amio 400 mg po bid when able to tolerate po   -Prelim ECHO with hyperdynamic LVEF, LVH.  -AC when cleared by primary team. CHADSVASC2  = 3, no need for bridging with IV heparin while holding AC  -Will fu post op

## 2022-01-24 NOTE — PROGRESS NOTE ADULT - SUBJECTIVE AND OBJECTIVE BOX
INTERVAL/OVERNIGHT EVENTS:  had continued low UOP tried another 500cc then stopped bolusing, looks edematous on exam    SUBJECTIVE: doing well; no abd pain,n/v; passing flatus and BM; no fevers chills, SOB or chest pain     Neurologic Medications  OLANZapine Injectable 2.5 milliGRAM(s) IntraMuscular every 6 hours PRN agitation    Respiratory Medications    Cardiovascular Medications  aMIOdarone Infusion 0.501 mG/Min IV Continuous <Continuous>    Gastrointestinal Medications  fat emulsion (Fish Oil and Plant Based) 20% Infusion 1.19 Gm/kG/Day IV Continuous <Continuous>  lactated ringers. 1000 milliLiter(s) IV Continuous <Continuous>  Parenteral Nutrition - Adult 1 Each TPN Continuous <Continuous>  polyethylene glycol 3350 17 Gram(s) Oral daily  potassium chloride   Solution 40 milliEquivalent(s) Oral once  potassium chloride  10 mEq/100 mL IVPB 10 milliEquivalent(s) IV Intermittent every 1 hour    Genitourinary Medications    Hematologic/Oncologic Medications  heparin   Injectable 5000 Unit(s) SubCutaneous every 12 hours    Antimicrobial/Immunologic Medications  azithromycin  IVPB 500 milliGRAM(s) IV Intermittent daily  cefTRIAXone   IVPB 1000 milliGRAM(s) IV Intermittent every 24 hours    Endocrine/Metabolic Medications    Topical/Other Medications  chlorhexidine 2% Cloths 1 Application(s) Topical <User Schedule>  latanoprost 0.005% Ophthalmic Solution 1 Drop(s) Both EYES at bedtime      MEDICATIONS  (PRN):  OLANZapine Injectable 2.5 milliGRAM(s) IntraMuscular every 6 hours PRN agitation      I&O's Detail    23 Jan 2022 07:01  -  24 Jan 2022 07:00  --------------------------------------------------------  IN:    Amiodarone: 400.8 mL    IV PiggyBack: 50 mL    IV PiggyBack: 200 mL    Lactated Ringers: 1725 mL    Lactated Ringers Bolus: 1000 mL  Total IN: 3375.8 mL    OUT:    Indwelling Catheter - Urethral (mL): 310 mL  Total OUT: 310 mL    Total NET: 3065.8 mL      24 Jan 2022 07:01  -  24 Jan 2022 08:24  --------------------------------------------------------  IN:    Amiodarone: 16.7 mL  Total IN: 16.7 mL    OUT:    Indwelling Catheter - Urethral (mL): 15 mL  Total OUT: 15 mL    Total NET: 1.7 mL          Vital Signs Last 24 Hrs  T(C): 36.6 (24 Jan 2022 05:30), Max: 36.8 (23 Jan 2022 09:15)  T(F): 97.8 (24 Jan 2022 05:30), Max: 98.2 (23 Jan 2022 09:15)  HR: 91 (24 Jan 2022 08:00) (75 - 111)  BP: 126/61 (24 Jan 2022 08:00) (100/57 - 164/70)  BP(mean): 88 (24 Jan 2022 08:00) (73 - 100)  RR: 24 (24 Jan 2022 08:00) (14 - 26)  SpO2: 96% (24 Jan 2022 08:00) (94% - 100%)    GENERAL: NAD, resting comfortably in bed, malnourished appearance   HEENT: NC/AT, EOMI, PERRLA, normal hearing  Pulmonary: Nonlabored breathing, no respiratory distress  Cardiovascular:  sinus rytham  Abdominal: soft, NT/ND, no rebound or guarding   Extremities: WWP, 5/5 strength x 4, no clubbing/cyanosis/edema  Neuro: A/O x2, no focal deficits    LABS:                        8.5    8.31  )-----------( 252      ( 24 Jan 2022 05:44 )             26.9     01-24    138  |  103  |  6<L>  ----------------------------<  90  2.6<LL>   |  23  |  0.58    Ca    8.5      24 Jan 2022 05:44  Phos  3.0     01-24  Mg     1.7     01-24    TPro  4.7<L>  /  Alb  2.3<L>  /  TBili  0.2  /  DBili  x   /  AST  13  /  ALT  6<L>  /  AlkPhos  64  01-24          RADIOLOGY & ADDITIONAL STUDIES:      Culture - Blood (collected 01-21-22 @ 00:53)  Source: .Blood Blood-Peripheral  Preliminary Report (01-22-22 @ 01:02):    No growth to date.    Culture - Blood (collected 01-21-22 @ 00:51)  Source: .Blood Blood-Peripheral  Preliminary Report (01-22-22 @ 01:02):    No growth to date.    Urinalysis with Rflx Culture (collected 01-20-22 @ 23:48)

## 2022-01-24 NOTE — CHART NOTE - NSCHARTNOTEFT_GEN_A_CORE
Continue to follow for emotional support and exploration of GOC. Discussed recent developments with patient's daughter/HCP over the phone and in person. Patient is recovering well from the procedure. Await CT Chest to assist with prognostication and determining appropriate plan of care.    Keith Dunbar, Palliative Care Attending  Questions/Concerns: Call [phone] (including Nights/Weekend) or message on [employer] Teams (Keith Dunbar) Continue to follow for emotional support and exploration of GOC. Discussed recent developments with patient's daughter/HCP over the phone and in person. Patient is recovering well from the procedure. Await CT Chest to assist with prognostication and determining appropriate plan of care.    Keith Dunbar, Palliative Care Attending  Questions/Concerns: Call [phone] (including Nights/Weekend) or message on [employer] Teams (Keith Dunbar)          PALLIATIVE MEDICINE COORDINATION OF CARE DOCUMENTATION: [x] Inpatient Consult  Non-Face-to-Face prolonged service provided that relates to (face-to-face) care that has or will occur and ongoing patient management, including one or more of the following: -Documentation review from other physicians and health care professional services -Review of medical records and diagnostic/radiology study results -Coordination with patient's support system  ************************************************************************  MEDICATION REVIEW:  MEDICATIONS  (STANDING):  aMIOdarone    Tablet 200 milliGRAM(s) Oral daily  cefTRIAXone   IVPB 1000 milliGRAM(s) IV Intermittent every 24 hours  chlorhexidine 2% Cloths 1 Application(s) Topical <User Schedule>  fat emulsion (Fish Oil and Plant Based) 20% Infusion 1.182 Gm/kG/Day (20.83 mL/Hr) IV Continuous <Continuous>  fat emulsion (Fish Oil and Plant Based) 20% Infusion 1.19 Gm/kG/Day (21 mL/Hr) IV Continuous <Continuous>  heparin   Injectable 5000 Unit(s) SubCutaneous every 12 hours  latanoprost 0.005% Ophthalmic Solution 1 Drop(s) Both EYES at bedtime  magnesium sulfate  IVPB 2 Gram(s) IV Intermittent once  Parenteral Nutrition - Adult 1 Each (50 mL/Hr) TPN Continuous <Continuous>  Parenteral Nutrition - Adult 1 Each (50 mL/Hr) TPN Continuous <Continuous>  polyethylene glycol 3350 17 Gram(s) Oral daily  potassium chloride   Powder 40 milliEquivalent(s) Oral once  potassium phosphate IVPB 30 milliMole(s) IV Intermittent once    MEDICATIONS  (PRN):  OLANZapine Injectable 2.5 milliGRAM(s) IntraMuscular every 6 hours PRN agitation  sodium chloride 0.9% lock flush 10 milliLiter(s) IV Push every 1 hour PRN Pre/post blood products, medications, blood draw, and to maintain line patency    ISTOP REFERENCE: [id number]  - see ISTOP Chart Note  - PRN usage: NO PRN'S  ------------------------------------------------------------------------  COORDINATION OF CARE:  - Palliative Care consulted for: [facility]  - Patient (to be) assessed: 1/21/22  - Patient previously seen by Palliative Care service: NO    ADVANCE CARE PLANNING  - Code status: FULL  - MOLST reviewed in chart: NONE; None found on [facility]  - HCP/Surrogate: Blanca Ulloa  - [facility] documents: NONE found on [place]  - HCP/Living will/Other Advanced Directives in Alpha: NONE found on Alpha  ------------------------------------------------------------------------  CARE PROVIDER DOCUMENTATION:  - SW/CM notes: Remains medically active  - Surgery notes: started on TPN  - GI notes: s/p stent, depending on surgical plan may plan for a complete colonoscopic evaluation prior to surgery    PLAN OF CARE  - Known admissions in past year: 0  - Current admit date: 1/20/22  - LOS: 4  - LACE score: 5  - Current dispo plan: TO BE DETERMINED  ------------------------------------------------------------------------  - Time Spent/Chart reviewed: 32 Minutes [including time used to gather, review and transfer data]  - Start: 5:00PM  - End: 5:32PM    Prolonged services rendered, as part of this patient's care provided by Palliative Medicine, include: i. chart review for provider and ancillary service documentation, ii. pertinent diagnostics including laboratory and imaging studies, iii. medication review including PRN use, iv. admission history including previous palliative care encounters and GOC notes, v. advance care planning documents including HCP and MOLST forms in Alpha. Part of Palliative Medicine extended evaluation and management also involves coordination of care with our IDT, the primary and consulting teams, and unit CM/SW and Hospice if eligible. Recommendations based on the information gathered and discussed are outlined in the A/P of Palliative notes.

## 2022-01-24 NOTE — PROGRESS NOTE ADULT - SUBJECTIVE AND OBJECTIVE BOX
GASTROENTEROLOGY PROGRESS NOTE  Patient seen and examined at bedside. Denied any abdominal complaints, abdomen is soft and decompressed    PERTINENT REVIEW OF SYSTEMS:  As noted above    Allergies    No Known Allergies    Intolerances      MEDICATIONS:  MEDICATIONS  (STANDING):  aMIOdarone Infusion 0.501 mG/Min (16.7 mL/Hr) IV Continuous <Continuous>  azithromycin  IVPB 500 milliGRAM(s) IV Intermittent daily  cefTRIAXone   IVPB 1000 milliGRAM(s) IV Intermittent every 24 hours  chlorhexidine 2% Cloths 1 Application(s) Topical <User Schedule>  fat emulsion (Fish Oil and Plant Based) 20% Infusion 1.19 Gm/kG/Day (21 mL/Hr) IV Continuous <Continuous>  heparin   Injectable 5000 Unit(s) SubCutaneous every 12 hours  lactated ringers. 1000 milliLiter(s) (75 mL/Hr) IV Continuous <Continuous>  latanoprost 0.005% Ophthalmic Solution 1 Drop(s) Both EYES at bedtime  Parenteral Nutrition - Adult 1 Each (50 mL/Hr) TPN Continuous <Continuous>  polyethylene glycol 3350 17 Gram(s) Oral daily    MEDICATIONS  (PRN):  OLANZapine Injectable 2.5 milliGRAM(s) IntraMuscular every 6 hours PRN agitation    Vital Signs Last 24 Hrs  T(C): 36.8 (24 Jan 2022 10:13), Max: 36.8 (24 Jan 2022 10:13)  T(F): 98.2 (24 Jan 2022 10:13), Max: 98.2 (24 Jan 2022 10:13)  HR: 94 (24 Jan 2022 10:00) (75 - 111)  BP: 130/61 (24 Jan 2022 10:00) (100/57 - 164/70)  BP(mean): 88 (24 Jan 2022 10:00) (73 - 100)  RR: 29 (24 Jan 2022 10:00) (14 - 29)  SpO2: 95% (24 Jan 2022 10:00) (94% - 100%)    01-23 @ 07:01  -  01-24 @ 07:00  --------------------------------------------------------  IN: 3375.8 mL / OUT: 310 mL / NET: 3065.8 mL    01-24 @ 07:01 - 01-24 @ 10:54  --------------------------------------------------------  IN: 1275.1 mL / OUT: 47 mL / NET: 1228.1 mL      PHYSICAL EXAM:    General: Well developed; in no acute distress, elderly  HEENT: MMM, conjunctiva and sclera clear  Gastrointestinal: Soft non-tender non-distended; No rebound or guarding  Skin: Warm and dry. No obvious rash    LABS:                        8.5    8.31  )-----------( 252      ( 24 Jan 2022 05:44 )             26.9     01-24    138  |  103  |  6<L>  ----------------------------<  90  2.6<LL>   |  23  |  0.58    Ca    8.5      24 Jan 2022 05:44  Phos  3.0     01-24  Mg     1.7     01-24    TPro  4.7<L>  /  Alb  2.3<L>  /  TBili  0.2  /  DBili  x   /  AST  13  /  ALT  6<L>  /  AlkPhos  64  01-24                      RADIOLOGY & ADDITIONAL STUDIES:  Reviewed

## 2022-01-24 NOTE — PROGRESS NOTE ADULT - ASSESSMENT
86yo female with afib (on Xarelto) dementia presents with weakness and poor PO intake. Afebrile, tachycardic, normotensive. Exam significant for distention, with LLQ tenderness, no rebound or guarding. Lungs coarse breath sounds with cough. Labs demonstrating normal WBC and LA, elevated BNP, CT with evidence of large bowel obstruction, narrowing of the rectum 9-12cm from the anal verge. Impression is large bowel obstruction, differential includes malignancy vs infectious/inflammatory. s/p C-scope with obstructive mass lesion in rectosigmoid at ~10 cm from the anal verge, and Stent placement     plan:   CLD, IVF  Plan for PICC and TPN   Continue antibiotics for pneumonia   F/u chest Ct read  Pending further evaluation and for possible Surgery  Step down   Rest of care per SICU    team 4 will continue to follow

## 2022-01-24 NOTE — PROGRESS NOTE ADULT - SUBJECTIVE AND OBJECTIVE BOX
PAST MEDICAL & SURGICAL HISTORY:  Glaucoma  Osteoarthritis  Syncope and collapse  PVC (premature ventricular contraction)  Cerebral aneurysm  History of loop recorder  History of cholecystectomy    Home Medications:  XARELTO 20MG TABLETS: TAKE 1 TABLET BY MOUTH DAILY (20 Jan 2022 22:01)      MEDICATIONS  (STANDING):  aMIOdarone Infusion 0.501 mG/Min (16.7 mL/Hr) IV Continuous <Continuous>  azithromycin  IVPB 500 milliGRAM(s) IV Intermittent daily  cefTRIAXone   IVPB 1000 milliGRAM(s) IV Intermittent every 24 hours  chlorhexidine 2% Cloths 1 Application(s) Topical <User Schedule>  fat emulsion (Fish Oil and Plant Based) 20% Infusion 1.19 Gm/kG/Day (21 mL/Hr) IV Continuous <Continuous>  heparin   Injectable 5000 Unit(s) SubCutaneous every 12 hours  lactated ringers Bolus 1000 milliLiter(s) IV Bolus once  lactated ringers. 1000 milliLiter(s) (75 mL/Hr) IV Continuous <Continuous>  latanoprost 0.005% Ophthalmic Solution 1 Drop(s) Both EYES at bedtime  Parenteral Nutrition - Adult 1 Each (50 mL/Hr) TPN Continuous <Continuous>  polyethylene glycol 3350 17 Gram(s) Oral daily    MEDICATIONS  (PRN):  OLANZapine Injectable 2.5 milliGRAM(s) IntraMuscular every 6 hours PRN agitation      .  VITAL SIGNS:  T(C): 36.6 (01-24-22 @ 05:30), Max: 36.7 (01-23-22 @ 18:27)  T(F): 97.8 (01-24-22 @ 05:30), Max: 98.1 (01-23-22 @ 20:00)  HR: 90 (01-24-22 @ 09:00) (75 - 111)  BP: 121/59 (01-24-22 @ 09:00) (100/57 - 164/70)  BP(mean): 85 (01-24-22 @ 09:00) (73 - 100)  RR: 25 (01-24-22 @ 09:00) (14 - 26)  SpO2: 96% (01-24-22 @ 09:00) (94% - 100%)  Wt(kg): --    PHYSICAL EXAM:    Constitutional: WDWN resting comfortably in bed; NAD  Head: NC/AT  Eyes: PERRL, EOMI, anicteric sclera  ENT: no nasal discharge; uvula midline, no oropharyngeal erythema or exudates; MMM  Neck: supple; no JVD or thyromegaly  Respiratory: CTA B/L; no W/R/R, no retractions  Cardiac: +S1/S2; RRR; no M/R/G; PMI non-displaced  Gastrointestinal: soft, NT/ND; no rebound or guarding; +BSx4  Genitourinary: normal external genitalia  Back: spine midline, no bony tenderness or step-offs; no CVAT B/L  Extremities: WWP, no clubbing or cyanosis; no peripheral edema  Musculoskeletal: NROM x4; no joint swelling, tenderness or erythema  Vascular: 2+ radial, femoral, DP/PT pulses B/L  Dermatologic: skin warm, dry and intact; no rashes, wounds, or scars  Lymphatic: no submandibular or cervical LAD  Neurologic: AAOx3; CNII-XII grossly intact; no focal deficits  Psychiatric: affect and characteristics of appearance, verbalizations, behaviors are appropriate      .  LABS:                         8.5    8.31  )-----------( 252      ( 24 Jan 2022 05:44 )             26.9     01-24    138  |  103  |  6<L>  ----------------------------<  90  2.6<LL>   |  23  |  0.58    Ca    8.5      24 Jan 2022 05:44  Phos  3.0     01-24  Mg     1.7     01-24    TPro  4.7<L>  /  Alb  2.3<L>  /  TBili  0.2  /  DBili  x   /  AST  13  /  ALT  6<L>  /  AlkPhos  64  01-24                  RADIOLOGY, EKG & ADDITIONAL TESTS: Reviewed.       < from: TTE Echo Complete w/o Contrast w/ Doppler (01.21.22 @ 12:01) >   1. Patient was tachycardicduring the study.   2. The left ventricle is normal in size and low normal systolic function   with a calculated ejection fraction of 55%.   3. Normal right ventricular size and systolic function.   4. Aortic sclerosis without significant stenosis.  5. No evidence of pulmonary hypertension, pulmonary artery systolic   pressure is 16 mmHg.   6. Trivial pericardial effusion.    < end of copied text >   Interval Hpi: Patient feels better, denies chest pain or palpitations. Tele reviewed. Pt remains in NSR    PAST MEDICAL & SURGICAL HISTORY:  Glaucoma  Osteoarthritis  Syncope and collapse  PVC (premature ventricular contraction)  Cerebral aneurysm  History of loop recorder  History of cholecystectomy    Home Medications:  XARELTO 20MG TABLETS: TAKE 1 TABLET BY MOUTH DAILY (20 Jan 2022 22:01)      MEDICATIONS  (STANDING):  aMIOdarone Infusion 0.501 mG/Min (16.7 mL/Hr) IV Continuous <Continuous>  azithromycin  IVPB 500 milliGRAM(s) IV Intermittent daily  cefTRIAXone   IVPB 1000 milliGRAM(s) IV Intermittent every 24 hours  chlorhexidine 2% Cloths 1 Application(s) Topical <User Schedule>  fat emulsion (Fish Oil and Plant Based) 20% Infusion 1.19 Gm/kG/Day (21 mL/Hr) IV Continuous <Continuous>  heparin   Injectable 5000 Unit(s) SubCutaneous every 12 hours  lactated ringers Bolus 1000 milliLiter(s) IV Bolus once  lactated ringers. 1000 milliLiter(s) (75 mL/Hr) IV Continuous <Continuous>  latanoprost 0.005% Ophthalmic Solution 1 Drop(s) Both EYES at bedtime  Parenteral Nutrition - Adult 1 Each (50 mL/Hr) TPN Continuous <Continuous>  polyethylene glycol 3350 17 Gram(s) Oral daily    MEDICATIONS  (PRN):  OLANZapine Injectable 2.5 milliGRAM(s) IntraMuscular every 6 hours PRN agitation      .  VITAL SIGNS:  T(C): 36.6 (01-24-22 @ 05:30), Max: 36.7 (01-23-22 @ 18:27)  T(F): 97.8 (01-24-22 @ 05:30), Max: 98.1 (01-23-22 @ 20:00)  HR: 90 (01-24-22 @ 09:00) (75 - 111)  BP: 121/59 (01-24-22 @ 09:00) (100/57 - 164/70)  BP(mean): 85 (01-24-22 @ 09:00) (73 - 100)  RR: 25 (01-24-22 @ 09:00) (14 - 26)  SpO2: 96% (01-24-22 @ 09:00) (94% - 100%)  Wt(kg): --    PHYSICAL EXAM:    Constitutional: WDWN resting comfortably in bed; NAD  Head: NC/AT  ENT:  MMM  Neck: supple; no JVD  Respiratory: CTA B/L; no W/R/R,   Cardiac: +S1/S2; RRR; no M/R/G; systolic murmur heard troughout the pericardium (soft)  Gastrointestinal: soft, NT/ND; no rebound or guarding; +BS  Extremities: WWP, no clubbing or cyanosis; no peripheral edema  Vascular: 2+ radial,  DP/PT pulses B/L          .  LABS:                         8.5    8.31  )-----------( 252      ( 24 Jan 2022 05:44 )             26.9     01-24    138  |  103  |  6<L>  ----------------------------<  90  2.6<LL>   |  23  |  0.58    Ca    8.5      24 Jan 2022 05:44  Phos  3.0     01-24  Mg     1.7     01-24    TPro  4.7<L>  /  Alb  2.3<L>  /  TBili  0.2  /  DBili  x   /  AST  13  /  ALT  6<L>  /  AlkPhos  64  01-24                  RADIOLOGY, EKG & ADDITIONAL TESTS: Reviewed.       < from: TTE Echo Complete w/o Contrast w/ Doppler (01.21.22 @ 12:01) >   1. Patient was tachycardicduring the study.   2. The left ventricle is normal in size and low normal systolic function   with a calculated ejection fraction of 55%.   3. Normal right ventricular size and systolic function.   4. Aortic sclerosis without significant stenosis.  5. No evidence of pulmonary hypertension, pulmonary artery systolic   pressure is 16 mmHg.   6. Trivial pericardial effusion.    < end of copied text >

## 2022-01-24 NOTE — CONSULT NOTE ADULT - SUBJECTIVE AND OBJECTIVE BOX
Vascular Access Service Consult Note    87yFemaleHEAL ISSUES - PROBLEM Dx:  Rectal mass      Diagnosis: rectal mass    Indications for Vascular Access (Check all that apply)  [  ]  Antibiotic Therapy       Antibiotic Prescribed:                                                                             Expected Duration of Therapy:               [  ]  IV Hydration  [ x ]  Total Parenteral Nutrition  [  ]  Chemotherapy  [  ]  Difficult Venous Access  [  ]  CVP monitoring  [  ]  Medications with high potential for tissue necrosis on extravasation  [  ]  Other    Screening (Check all that apply)  Previous Radiation to chest  [  ] Yes      [x  ]  No  Breast Cancer                          [  ] Left     [  ]  Right    [ x ]  No  Lymph Node Dissection         [  ] Left     [  ]  Right    [x  ]  No  Pacemaker or ICD                   [x  ] Left     [  ]  Right    [  ]  No  Upper Extremity DVT             [  ] Left     [  ]  Right    [ x ]  No  Chronic Kidney Disease         [  ]  Yes     [x  ]  No  Hemodialysis                           [  ]  Yes     [ x ]  No  AV Fistula/ Graft                     [  ]  Left    [  ]  Right    [x  ]  No  Temp>101F in past 24 H       [  ]  Yes     [ x]  No  H/O PICC/Midline                   [  ]  Yes     [ x ]  No    Lab data:                        8.5    8.31  )-----------( 252      ( 24 Jan 2022 05:44 )             26.9     01-24    138  |  103  |  6<L>  ----------------------------<  90  2.6<LL>   |  23  |  0.58    Ca    8.5      24 Jan 2022 05:44  Phos  3.0     01-24  Mg     1.7     01-24    TPro  4.7<L>  /  Alb  2.3<L>  /  TBili  0.2  /  DBili  x   /  AST  13  /  ALT  6<L>  /  AlkPhos  64  01-24              I have reviewed the chart, interviewed and examined the patient and determined that this patient:  [ x ] Is a candidate for a PICC line  [  ] Is a candidate for a Midline  [  ] Is not a candidate for vascular access device (reason)    Lumens:    [  ] Single  [ x ] Double

## 2022-01-25 LAB
A1C WITH ESTIMATED AVERAGE GLUCOSE RESULT: 4.7 % — SIGNIFICANT CHANGE UP (ref 4–5.6)
ANION GAP SERPL CALC-SCNC: 10 MMOL/L — SIGNIFICANT CHANGE UP (ref 5–17)
ANION GAP SERPL CALC-SCNC: 11 MMOL/L — SIGNIFICANT CHANGE UP (ref 5–17)
ANION GAP SERPL CALC-SCNC: 9 MMOL/L — SIGNIFICANT CHANGE UP (ref 5–17)
BUN SERPL-MCNC: 11 MG/DL — SIGNIFICANT CHANGE UP (ref 7–23)
BUN SERPL-MCNC: 12 MG/DL — SIGNIFICANT CHANGE UP (ref 7–23)
BUN SERPL-MCNC: 12 MG/DL — SIGNIFICANT CHANGE UP (ref 7–23)
BUN SERPL-MCNC: 8 MG/DL — SIGNIFICANT CHANGE UP (ref 7–23)
BUN SERPL-MCNC: 9 MG/DL — SIGNIFICANT CHANGE UP (ref 7–23)
CALCIUM SERPL-MCNC: 7.7 MG/DL — LOW (ref 8.4–10.5)
CALCIUM SERPL-MCNC: 7.8 MG/DL — LOW (ref 8.4–10.5)
CALCIUM SERPL-MCNC: 8 MG/DL — LOW (ref 8.4–10.5)
CALCIUM SERPL-MCNC: 8.2 MG/DL — LOW (ref 8.4–10.5)
CALCIUM SERPL-MCNC: 8.4 MG/DL — SIGNIFICANT CHANGE UP (ref 8.4–10.5)
CHLORIDE SERPL-SCNC: 101 MMOL/L — SIGNIFICANT CHANGE UP (ref 96–108)
CHLORIDE SERPL-SCNC: 102 MMOL/L — SIGNIFICANT CHANGE UP (ref 96–108)
CHLORIDE SERPL-SCNC: 98 MMOL/L — SIGNIFICANT CHANGE UP (ref 96–108)
CHLORIDE SERPL-SCNC: 98 MMOL/L — SIGNIFICANT CHANGE UP (ref 96–108)
CHLORIDE SERPL-SCNC: 99 MMOL/L — SIGNIFICANT CHANGE UP (ref 96–108)
CO2 SERPL-SCNC: 27 MMOL/L — SIGNIFICANT CHANGE UP (ref 22–31)
CO2 SERPL-SCNC: 29 MMOL/L — SIGNIFICANT CHANGE UP (ref 22–31)
CO2 SERPL-SCNC: 29 MMOL/L — SIGNIFICANT CHANGE UP (ref 22–31)
CO2 SERPL-SCNC: 30 MMOL/L — SIGNIFICANT CHANGE UP (ref 22–31)
CO2 SERPL-SCNC: 31 MMOL/L — SIGNIFICANT CHANGE UP (ref 22–31)
CREAT SERPL-MCNC: 0.51 MG/DL — SIGNIFICANT CHANGE UP (ref 0.5–1.3)
CREAT SERPL-MCNC: 0.51 MG/DL — SIGNIFICANT CHANGE UP (ref 0.5–1.3)
CREAT SERPL-MCNC: 0.53 MG/DL — SIGNIFICANT CHANGE UP (ref 0.5–1.3)
CREAT SERPL-MCNC: 0.54 MG/DL — SIGNIFICANT CHANGE UP (ref 0.5–1.3)
CREAT SERPL-MCNC: 0.56 MG/DL — SIGNIFICANT CHANGE UP (ref 0.5–1.3)
ESTIMATED AVERAGE GLUCOSE: 88 MG/DL — SIGNIFICANT CHANGE UP (ref 68–114)
GLUCOSE SERPL-MCNC: 148 MG/DL — HIGH (ref 70–99)
GLUCOSE SERPL-MCNC: 188 MG/DL — HIGH (ref 70–99)
GLUCOSE SERPL-MCNC: 194 MG/DL — HIGH (ref 70–99)
GLUCOSE SERPL-MCNC: 201 MG/DL — HIGH (ref 70–99)
GLUCOSE SERPL-MCNC: 204 MG/DL — HIGH (ref 70–99)
HCT VFR BLD CALC: 28.5 % — LOW (ref 34.5–45)
HGB BLD-MCNC: 9.2 G/DL — LOW (ref 11.5–15.5)
MAGNESIUM SERPL-MCNC: 1.9 MG/DL — SIGNIFICANT CHANGE UP (ref 1.6–2.6)
MAGNESIUM SERPL-MCNC: 2 MG/DL — SIGNIFICANT CHANGE UP (ref 1.6–2.6)
MCHC RBC-ENTMCNC: 28.9 PG — SIGNIFICANT CHANGE UP (ref 27–34)
MCHC RBC-ENTMCNC: 32.3 GM/DL — SIGNIFICANT CHANGE UP (ref 32–36)
MCV RBC AUTO: 89.6 FL — SIGNIFICANT CHANGE UP (ref 80–100)
NRBC # BLD: 0 /100 WBCS — SIGNIFICANT CHANGE UP (ref 0–0)
PHOSPHATE SERPL-MCNC: 1.5 MG/DL — LOW (ref 2.5–4.5)
PHOSPHATE SERPL-MCNC: 2.4 MG/DL — LOW (ref 2.5–4.5)
PHOSPHATE SERPL-MCNC: 3.4 MG/DL — SIGNIFICANT CHANGE UP (ref 2.5–4.5)
PHOSPHATE SERPL-MCNC: 4 MG/DL — SIGNIFICANT CHANGE UP (ref 2.5–4.5)
PLATELET # BLD AUTO: 220 K/UL — SIGNIFICANT CHANGE UP (ref 150–400)
POTASSIUM SERPL-MCNC: 2 MMOL/L — CRITICAL LOW (ref 3.5–5.3)
POTASSIUM SERPL-MCNC: 2.2 MMOL/L — CRITICAL LOW (ref 3.5–5.3)
POTASSIUM SERPL-MCNC: 2.3 MMOL/L — CRITICAL LOW (ref 3.5–5.3)
POTASSIUM SERPL-MCNC: 2.5 MMOL/L — CRITICAL LOW (ref 3.5–5.3)
POTASSIUM SERPL-MCNC: 3.2 MMOL/L — LOW (ref 3.5–5.3)
POTASSIUM SERPL-SCNC: 2 MMOL/L — CRITICAL LOW (ref 3.5–5.3)
POTASSIUM SERPL-SCNC: 2.2 MMOL/L — CRITICAL LOW (ref 3.5–5.3)
POTASSIUM SERPL-SCNC: 2.3 MMOL/L — CRITICAL LOW (ref 3.5–5.3)
POTASSIUM SERPL-SCNC: 2.5 MMOL/L — CRITICAL LOW (ref 3.5–5.3)
POTASSIUM SERPL-SCNC: 3.2 MMOL/L — LOW (ref 3.5–5.3)
RBC # BLD: 3.18 M/UL — LOW (ref 3.8–5.2)
RBC # FLD: 13.9 % — SIGNIFICANT CHANGE UP (ref 10.3–14.5)
SODIUM SERPL-SCNC: 137 MMOL/L — SIGNIFICANT CHANGE UP (ref 135–145)
SODIUM SERPL-SCNC: 138 MMOL/L — SIGNIFICANT CHANGE UP (ref 135–145)
SODIUM SERPL-SCNC: 139 MMOL/L — SIGNIFICANT CHANGE UP (ref 135–145)
SODIUM SERPL-SCNC: 139 MMOL/L — SIGNIFICANT CHANGE UP (ref 135–145)
SODIUM SERPL-SCNC: 141 MMOL/L — SIGNIFICANT CHANGE UP (ref 135–145)
WBC # BLD: 8.32 K/UL — SIGNIFICANT CHANGE UP (ref 3.8–10.5)
WBC # FLD AUTO: 8.32 K/UL — SIGNIFICANT CHANGE UP (ref 3.8–10.5)

## 2022-01-25 PROCEDURE — 93010 ELECTROCARDIOGRAM REPORT: CPT

## 2022-01-25 PROCEDURE — 99232 SBSQ HOSP IP/OBS MODERATE 35: CPT

## 2022-01-25 PROCEDURE — 99223 1ST HOSP IP/OBS HIGH 75: CPT

## 2022-01-25 PROCEDURE — 99232 SBSQ HOSP IP/OBS MODERATE 35: CPT | Mod: GC

## 2022-01-25 PROCEDURE — 99233 SBSQ HOSP IP/OBS HIGH 50: CPT

## 2022-01-25 RX ORDER — AMIODARONE HYDROCHLORIDE 400 MG/1
200 TABLET ORAL DAILY
Refills: 0 | Status: DISCONTINUED | OUTPATIENT
Start: 2022-01-25 | End: 2022-02-04

## 2022-01-25 RX ORDER — I.V. FAT EMULSION 20 G/100ML
1.18 EMULSION INTRAVENOUS
Qty: 50 | Refills: 0 | Status: DISCONTINUED | OUTPATIENT
Start: 2022-01-25 | End: 2022-01-25

## 2022-01-25 RX ORDER — SOD SULF/SODIUM/NAHCO3/KCL/PEG
2000 SOLUTION, RECONSTITUTED, ORAL ORAL ONCE
Refills: 0 | Status: COMPLETED | OUTPATIENT
Start: 2022-01-25 | End: 2022-01-25

## 2022-01-25 RX ORDER — POTASSIUM CHLORIDE 20 MEQ
40 PACKET (EA) ORAL EVERY 4 HOURS
Refills: 0 | Status: COMPLETED | OUTPATIENT
Start: 2022-01-25 | End: 2022-01-25

## 2022-01-25 RX ORDER — POTASSIUM CHLORIDE 20 MEQ
20 PACKET (EA) ORAL
Refills: 0 | Status: COMPLETED | OUTPATIENT
Start: 2022-01-25 | End: 2022-01-25

## 2022-01-25 RX ORDER — I.V. FAT EMULSION 20 G/100ML
1 EMULSION INTRAVENOUS
Qty: 50 | Refills: 0 | Status: DISCONTINUED | OUTPATIENT
Start: 2022-01-25 | End: 2022-01-25

## 2022-01-25 RX ORDER — POTASSIUM CHLORIDE 20 MEQ
40 PACKET (EA) ORAL ONCE
Refills: 0 | Status: DISCONTINUED | OUTPATIENT
Start: 2022-01-25 | End: 2022-01-25

## 2022-01-25 RX ORDER — POTASSIUM PHOSPHATE, MONOBASIC POTASSIUM PHOSPHATE, DIBASIC 236; 224 MG/ML; MG/ML
30 INJECTION, SOLUTION INTRAVENOUS ONCE
Refills: 0 | Status: DISCONTINUED | OUTPATIENT
Start: 2022-01-25 | End: 2022-01-25

## 2022-01-25 RX ORDER — KETOROLAC TROMETHAMINE 30 MG/ML
15 SYRINGE (ML) INJECTION ONCE
Refills: 0 | Status: DISCONTINUED | OUTPATIENT
Start: 2022-01-25 | End: 2022-01-25

## 2022-01-25 RX ORDER — POTASSIUM CHLORIDE 20 MEQ
40 PACKET (EA) ORAL ONCE
Refills: 0 | Status: COMPLETED | OUTPATIENT
Start: 2022-01-25 | End: 2022-01-25

## 2022-01-25 RX ORDER — POTASSIUM CHLORIDE 20 MEQ
10 PACKET (EA) ORAL
Refills: 0 | Status: COMPLETED | OUTPATIENT
Start: 2022-01-25 | End: 2022-01-25

## 2022-01-25 RX ORDER — ELECTROLYTE SOLUTION,INJ
1 VIAL (ML) INTRAVENOUS
Refills: 0 | Status: DISCONTINUED | OUTPATIENT
Start: 2022-01-25 | End: 2022-01-25

## 2022-01-25 RX ORDER — MAGNESIUM SULFATE 500 MG/ML
2 VIAL (ML) INJECTION ONCE
Refills: 0 | Status: COMPLETED | OUTPATIENT
Start: 2022-01-25 | End: 2022-01-25

## 2022-01-25 RX ORDER — POTASSIUM PHOSPHATE, MONOBASIC POTASSIUM PHOSPHATE, DIBASIC 236; 224 MG/ML; MG/ML
30 INJECTION, SOLUTION INTRAVENOUS ONCE
Refills: 0 | Status: COMPLETED | OUTPATIENT
Start: 2022-01-25 | End: 2022-01-25

## 2022-01-25 RX ADMIN — Medication 50 MILLIEQUIVALENT(S): at 21:36

## 2022-01-25 RX ADMIN — Medication 50 MILLIEQUIVALENT(S): at 17:45

## 2022-01-25 RX ADMIN — Medication 15 MILLIGRAM(S): at 11:34

## 2022-01-25 RX ADMIN — AMIODARONE HYDROCHLORIDE 16.7 MG/MIN: 400 TABLET ORAL at 02:00

## 2022-01-25 RX ADMIN — Medication 100 MILLIEQUIVALENT(S): at 12:31

## 2022-01-25 RX ADMIN — Medication 50 MILLIEQUIVALENT(S): at 19:00

## 2022-01-25 RX ADMIN — CEFTRIAXONE 100 MILLIGRAM(S): 500 INJECTION, POWDER, FOR SOLUTION INTRAMUSCULAR; INTRAVENOUS at 15:04

## 2022-01-25 RX ADMIN — I.V. FAT EMULSION 20.83 GM/KG/DAY: 20 EMULSION INTRAVENOUS at 20:27

## 2022-01-25 RX ADMIN — LATANOPROST 1 DROP(S): 0.05 SOLUTION/ DROPS OPHTHALMIC; TOPICAL at 21:36

## 2022-01-25 RX ADMIN — AMIODARONE HYDROCHLORIDE 200 MILLIGRAM(S): 400 TABLET ORAL at 10:26

## 2022-01-25 RX ADMIN — HEPARIN SODIUM 5000 UNIT(S): 5000 INJECTION INTRAVENOUS; SUBCUTANEOUS at 17:45

## 2022-01-25 RX ADMIN — Medication 40 MILLIEQUIVALENT(S): at 09:40

## 2022-01-25 RX ADMIN — HEPARIN SODIUM 5000 UNIT(S): 5000 INJECTION INTRAVENOUS; SUBCUTANEOUS at 05:14

## 2022-01-25 RX ADMIN — Medication 100 MILLIEQUIVALENT(S): at 09:40

## 2022-01-25 RX ADMIN — OLANZAPINE 2.5 MILLIGRAM(S): 15 TABLET, FILM COATED ORAL at 23:31

## 2022-01-25 RX ADMIN — Medication 25 GRAM(S): at 16:31

## 2022-01-25 RX ADMIN — Medication 50 EACH: at 20:29

## 2022-01-25 RX ADMIN — Medication 100 MILLIEQUIVALENT(S): at 10:40

## 2022-01-25 RX ADMIN — Medication 40 MILLIEQUIVALENT(S): at 15:03

## 2022-01-25 RX ADMIN — POTASSIUM PHOSPHATE, MONOBASIC POTASSIUM PHOSPHATE, DIBASIC 83.33 MILLIMOLE(S): 236; 224 INJECTION, SOLUTION INTRAVENOUS at 10:28

## 2022-01-25 RX ADMIN — Medication 40 MILLIEQUIVALENT(S): at 12:31

## 2022-01-25 RX ADMIN — CHLORHEXIDINE GLUCONATE 1 APPLICATION(S): 213 SOLUTION TOPICAL at 05:14

## 2022-01-25 NOTE — CHART NOTE - NSCHARTNOTEFT_GEN_A_CORE
Notified by RN patient agitated, wishing to leave, pulling at lines and drains. Evaluated patient, who expressed anger with examiner for "holding her here" and "being a part of them." Does not wish to drink prep. Patient became more agitated and continued to try and leave bed. Afebrile, tachycardic to 120's sinus but normotensive. TPN running. Administered Zyprexa 2.5 with some improvement in agitation. Continues to refuse colonoscopy prep.     Plan  - continue reorientation  - continue 1:1 observation for patient safety  - continue to encourage prep and reorientation  - Remainder of K repletion running Notified by RN patient agitated, wishing to leave, pulling at lines. Evaluated patient, who expressed anger with examiner for "holding her here" and "being a part of them." Does not wish to drink prep. Patient became more agitated and continued to try and leave bed. Afebrile, tachycardic to 120's sinus but normotensive. TPN running. Administered Zyprexa 2.5 with some improvement in agitation. Continues to refuse colonoscopy prep.     Plan  - continue reorientation  - continue 1:1 observation for patient safety  - continue to encourage prep and reorientation  - Remainder of K repletion running

## 2022-01-25 NOTE — PROGRESS NOTE ADULT - ASSESSMENT
88yo female with afib (on Xarelto) dementia presents with weakness and poor PO intake. Exam significant for distention, CT with evidence of large bowel obstruction, narrowing of the rectum 9-12cm from the anal verge, differential includes malignancy vs infectious/inflammatory.     # Rectal stricture  s/p Flex Sig (1/21/22); An obstructive mass lesion seen in the rectosigmoid at ~10 cm s/p placement of uncovered 24 x 120 mm SEMS  - abdomen decompressed after stent placement with good BMs  - Plan for colonoscopy on Thursday  - 2L [place] today, 4L starting tomorrow morning  - clear liquid diet tomorrow and NPO past Wednesday midnight   - send covid PCR swab tomorrow    Case discussed with adv attending and primary team.     Recommendations discussed with primary team    Grant Kenney MD  PGY-5 GI fellow  Pager: [phone]

## 2022-01-25 NOTE — PROGRESS NOTE ADULT - PROBLEM SELECTOR PLAN 1
.  -recommend Tylenol Suspension 650mg PO q8h ATC x1-2 days  -recommend Zofran 4mg IV q6h PRN for Nausea/Vomiting    Since she does not advocate well for herself, scheduled medications may be helpful

## 2022-01-25 NOTE — PROGRESS NOTE ADULT - ASSESSMENT
86 yo female with Pmhx of Afib (on Xarelto) with SN dysfunction s/p PPM, dementia presents with failure to thrive with weakness and poor PO intake found to have a large bowel obstruction, narrowing of the rectum 9-12cm from the anal verge, s/p Flex/SIg + Stenting with course notable for Afib with RVR now in NSR    1) Atrial Fibrillation with RVR in the setting of large bowel obstruction, now in NSR  -Patient with known Hx of Afib on Xarelto, however with active Eliquis prescription as well, here with large bowel obstruction  -Pt admitted with Afib RVR with -160 in NSR after amiodarone GTT infusion  -Echo showing preserved BIV function without valvular pathology  -Would decrease amio to the 200 mg PO daily in the setting of marked Hypokalemia  -Please ensure Potassium of 4 with IV and oral supplementation or more as patient at risk for repeat tachyarrythmia. Would keep Magnesium above 2 as well  -Would resumed AC when cleared by primary team given CHADSVASC2  = 3. Currently no need for bridging with IV heparin while holding AC  -Palliative consulted for further GOC discussion  -Cardiology will continue to follow post op. Please call with any questions

## 2022-01-25 NOTE — PROGRESS NOTE ADULT - SUBJECTIVE AND OBJECTIVE BOX
[facility] Geriatrics and Palliative Care  Keith Dunbar, Palliative Care Attending  Contact Info: Call [phone] (HEAL Line) or message on Microsoft Teams (Keith Dunbar)    SUBJECTIVE AND OBJECTIVE:  INTERVAL HPI/OVERNIGHT EVENTS: Interval events noted. Patient required PRNs of  in past 24hrs. No unexpected adverse effects of opiates noted: no sedation/dizziness/nausea.    ALLERGIES:  No Known Allergies    MEDICATIONS  (STANDING):  aMIOdarone    Tablet 200 milliGRAM(s) Oral daily  chlorhexidine 2% Cloths 1 Application(s) Topical <User Schedule>  fat emulsion (Fish Oil and Plant Based) 20% Infusion 1.182 Gm/kG/Day (20.83 mL/Hr) IV Continuous <Continuous>  heparin   Injectable 5000 Unit(s) SubCutaneous every 12 hours  latanoprost 0.005% Ophthalmic Solution 1 Drop(s) Both EYES at bedtime  Parenteral Nutrition - Adult 1 Each (50 mL/Hr) TPN Continuous <Continuous>  Parenteral Nutrition - Adult 1 Each (50 mL/Hr) TPN Continuous <Continuous>  potassium chloride  20 mEq/100 mL IVPB 20 milliEquivalent(s) IV Intermittent every 2 hours    MEDICATIONS  (PRN):  OLANZapine Injectable 2.5 milliGRAM(s) IntraMuscular every 6 hours PRN agitation  sodium chloride 0.9% lock flush 10 milliLiter(s) IV Push every 1 hour PRN Pre/post blood products, medications, blood draw, and to maintain line patency      ITEMS UNCHECKED ARE NOT PRESENT  PRESENT SYMPTOMS/REVIEW OF SYSTEMS: []Unable to obtain due to poor mentation   Source if other than patient:  []Family   []Team         Vital Signs Last 24 Hrs  T(C): 36 (25 Jan 2022 16:25), Max: 36.6 (25 Jan 2022 01:04)  T(F): 96.8 (25 Jan 2022 16:25), Max: 97.9 (25 Jan 2022 05:10)  HR: 85 (25 Jan 2022 16:25) (84 - 115)  BP: 114/56 (25 Jan 2022 16:25) (114/56 - 140/77)  BP(mean): --  RR: 20 (25 Jan 2022 16:25) (17 - 20)  SpO2: 96% (25 Jan 2022 16:25) (94% - 100%)    LABS:                         9.2    8.32  )-----------( 220      ( 25 Jan 2022 06:59 )             28.5   01-25    139  |  101  |  12  ----------------------------<  188<H>  2.5<LL>   |  29  |  0.51    Ca    7.7<L>      25 Jan 2022 15:54  Phos  2.4     01-25  Mg     1.9     01-25    TPro  4.7<L>  /  Alb  2.3<L>  /  TBili  0.2  /  DBili  x   /  AST  13  /  ALT  6<L>  /  AlkPhos  64  01-24    RADIOLOGY & ADDITIONAL STUDIES: None new    PROTEIN CALORIE MALNUTRITION PRESENT: [ ]mild [ ]moderate [ ]severe [ ]underweight [ ]morbid obesity  [] PPSV2 < or = 30% [] significant weight loss [] poor nutritional intake [] anasarca [] catabolic state    Artificial Nutrition []     DISCUSSION OF CASE: Family - to provide updates and emotional support    Goals of Care Document:   Care Coordination Document:   PROGRESS NOTE  Date & Time of Note   2022-01-25 12:52  Notes: SW continues to follow. Patient discussed during IDR. As per medical  team, patient is anticipated to be medically cleared for discharge on Thursday 1/27 or Friday 1/28. JESSE called daughter (Balnca/ ph: [phone]/ email:  [email or web address]) and educated daughter on PT discharge recommendation for  CALE. Daughter agreeable to placement. SW provided patient with list of  facilities in [place]; SW emailed list. Daughter requested time to review CALE  choices. SW informed daughter of need for at least 3 choices and daughter  verbalized understanding. Daughter requested medical team to call her and  inquired about tests for patient to receive official diagnosis of dementia. SW  paged medical team and made medical team aware. SW to continue to follow. Of  note, patient received 2 Pfizer COVID vaccines on 1/22/2021 and 2/12/2021  followed by Moderna booster on 12/18/2021.  Electronically signed by:  Wade Pinon  Electronically signed on:  2022-01-25  12:57 [facility] Geriatrics and Palliative Care  Keith Dunbar, Palliative Care Attending  Contact Info: Call [phone] ([facility] Line) or message on Microsoft Teams (Keith Dunbar)    SUBJECTIVE AND OBJECTIVE:  INTERVAL HPI/OVERNIGHT EVENTS: No significant events noted. Saw patient in the morning and evening. She is endorsing mild headache associated with abdominal cramps and nausea. In the evening, PCA was assisting her feeding and she was tolerating liquids ok. Denies other complaints and overtly comfortable. Says she had 4BMs today. Calm and cooperative with questions. Daughter not at bedside. Patient required no additional PRNs in past 24hrs.     ALLERGIES:  No Known Allergies    MEDICATIONS  (STANDING):  aMIOdarone    Tablet 200 milliGRAM(s) Oral daily  chlorhexidine 2% Cloths 1 Application(s) Topical <User Schedule>  fat emulsion (Fish Oil and Plant Based) 20% Infusion 1.182 Gm/kG/Day (20.83 mL/Hr) IV Continuous <Continuous>  heparin   Injectable 5000 Unit(s) SubCutaneous every 12 hours  latanoprost 0.005% Ophthalmic Solution 1 Drop(s) Both EYES at bedtime  Parenteral Nutrition - Adult 1 Each (50 mL/Hr) TPN Continuous <Continuous>  Parenteral Nutrition - Adult 1 Each (50 mL/Hr) TPN Continuous <Continuous>  potassium chloride  20 mEq/100 mL IVPB 20 milliEquivalent(s) IV Intermittent every 2 hours    MEDICATIONS  (PRN):  OLANZapine Injectable 2.5 milliGRAM(s) IntraMuscular every 6 hours PRN agitation  sodium chloride 0.9% lock flush 10 milliLiter(s) IV Push every 1 hour PRN Pre/post blood products, medications, blood draw, and to maintain line patency      ITEMS UNCHECKED ARE NOT PRESENT  PRESENT SYMPTOMS/REVIEW OF SYSTEMS: []Unable to obtain due to poor mentation   Source if other than patient:  []Family   []Team     Pain: [x]yes [ ]no  QOL impact - tolerable  Location - [place]                Aggravating factors -  Quality - ache  Radiation - none  Timing - intermittent  Severity (0-10 scale): 2  Minimal acceptable level (0-10 scale):     Dyspnea:                           [ ]Mild [ ]Moderate [ ]Severe  Anxiety:                             [ ]Mild [ ]Moderate [ ]Severe  Fatigue:                             [ ]Mild [ ]Moderate [ ]Severe  Nausea:                             [x]Mild [ ]Moderate [ ]Severe  Loss of appetite:              [ ]Mild [ ]Moderate [ ]Severe  Constipation:                    [ ]Mild [ ]Moderate [ ]Severe    Other Symptoms:  [x]All other review of systems negative     Palliative Performance Status Version 2:         40%       GENERAL:  [x]Alert  [x]Oriented x2   [ ]Lethargic  [ ]Cachexia  [ ]Unarousable  [x]Verbal  [ ]Non-Verbal  Behavioral:   [ ] Anxiety  [ ] Delirium [ ] Agitation [x] Cooperative  HEENT:  [x]Normal   [ ]Dry mouth   [ ]ET Tube/Trach  [ ]Oral lesions  PULMONARY:   [ xClear [ ]Tachypnea  [ ]Audible excessive secretions   [ ]Rhonchi        [ ]Right [ ]Left [ ]Bilateral  [ ]Crackles        [ ]Right [ ]Left [ ]Bilateral  [ ]Wheezing     [ ]Right [ ]Left [ ]Bilateral  [ ]Diminished breath sounds [ ]right [ ]left [ ]bilateral  CARDIOVASCULAR:    [x]Regular [ ]Irregular [ ]Tachy  [ ]Kai [ ]Murmur [ ]Other  GASTROINTESTINAL:  [x]Soft  [x]Distended   [ ]+BS  [x]Non tender [ ]Tender  [ ]PEG [ ]OGT/ NGT  Last BM:   GENITOURINARY:  [x]Normal [ ] Incontinent   [ ]Oliguria/Anuria   [ ]Reyes  MUSCULOSKELETAL:   [ ]Normal   [x]Weakness  [ ]Bed/Wheelchair bound [ ]Edema  NEUROLOGIC:   [ ]No focal deficits  [x]Cognitive impairment  [ ]Dysphagia [ ]Dysarthria [ ]Paresis [ ]Other   SKIN:   [x]Normal    [ ]Rash  [ ]Pressure ulcer(s)       Present on admission [ ]y [     Vital Signs Last 24 Hrs  T(C): 36 (25 Jan 2022 16:25), Max: 36.6 (25 Jan 2022 01:04)  T(F): 96.8 (25 Jan 2022 16:25), Max: 97.9 (25 Jan 2022 05:10)  HR: 85 (25 Jan 2022 16:25) (84 - 115)  BP: 114/56 (25 Jan 2022 16:25) (114/56 - 140/77)  BP(mean): --  RR: 20 (25 Jan 2022 16:25) (17 - 20)  SpO2: 96% (25 Jan 2022 16:25) (94% - 100%)    LABS:                         9.2    8.32  )-----------( 220      ( 25 Jan 2022 06:59 )             28.5   01-25    139  |  101  |  12  ----------------------------<  188<H>  2.5<LL>   |  29  |  0.51    Ca    7.7<L>      25 Jan 2022 15:54  Phos  2.4     01-25  Mg     1.9     01-25    TPro  4.7<L>  /  Alb  2.3<L>  /  TBili  0.2  /  DBili  x   /  AST  13  /  ALT  6<L>  /  AlkPhos  64  01-24    RADIOLOGY & ADDITIONAL STUDIES: None new    PROTEIN CALORIE MALNUTRITION PRESENT: [ ]mild [ ]moderate [x]severe [ ]underweight [ ]morbid obesity  [] PPSV2 < or = 30% [x] significant weight loss [x] poor nutritional intake [] anasarca [] catabolic state    Artificial Nutrition [x]     Goals of Care Document:   Care Coordination Document:   PROGRESS NOTE  Date & Time of Note   2022-01-25 12:52  Notes: SW continues to follow. Patient discussed during IDR. As per medical  team, patient is anticipated to be medically cleared for discharge on Thursday 1/27 or Friday 1/28. JESSE called daughter (Blanca/ ph: [phone]/ email:  [email or web address]) and educated daughter on PT discharge recommendation for  C[facility]. Daughter agreeable to placement. SW provided patient with list of  facilities in [place]; SW emailed list. Daughter requested time to review CALE  choices. SW informed daughter of need for at least 3 choices and daughter  verbalized understanding. Daughter requested medical team to call her and  inquired about tests for patient to receive official diagnosis of dementia. SW  paged medical team and made medical team aware. SW to continue to follow. Of  note, patient received 2 Pfizer COVID vaccines on 1/22/2021 and 2/12/2021  followed by Moderna booster on 12/18/2021.  Electronically signed by:  Wade Pinon  Electronically signed on:  2022-01-25  12:57

## 2022-01-25 NOTE — CONSULT NOTE ADULT - ASSESSMENT
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 Confused.   Change in mental status from baseline likely 2/2 stress of hospitalization, rectosigmoid cancer, and afib superimposed on poor substrate (history of dementia)   - frequent reorientation     # BMI <19  BMI: BMI (kg/m2): 16.5 (01-22-22 @ 05:00)  Affects all aspects of care.  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 Confused.   Change in mental status from baseline likely 2/2 stress of hospitalization, rectosigmoid cancer, and afib superimposed on poor substrate (history of dementia)   - frequent reorientation     # BMI <19  BMI: BMI (kg/m2): 16.5 (01-22-22 @ 05:00)  Affects all aspects of care.  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 Confused.   Change in mental status from baseline likely 2/2 stress of hospitalization, rectosigmoid cancer, and afib superimposed on poor substrate (history of dementia)   - frequent reorientation     # BMI <19  BMI: BMI (kg/m2): 16.5 (01-22-22 @ 05:00)  Affects all aspects of care.  Dose medications by weight   On TPN     # Hypokalemia   Potassium, Serum: 3.2 mmol/L (01-25-22 @ 20:17)  Potassium, Serum: 2.5 mmol/L (01-25-22 @ 15:54)   ; Likely 2/2 poor PO intake; Replete  Keep Mg >2    # Hypophosphatemia - Phosphorus Level, Serum:   Phosphorus Level, Serum: 2.4 mg/dL (01-25-22 @ 12:43)   ; Likely 2/2 poor PO intake; Replete    # vertebral compression fracture   CT abd from this admission showing ".  Severe L2 vertebral body compression deformity and retropulsed   posterior fragment moderately narrowing the central canal at this level."  [ ] Recommend eval by neurosurgery ; may benefit from back brace when patient goes to rehab. defer recs re: further imaging and management to NSGY consult.

## 2022-01-25 NOTE — PROGRESS NOTE ADULT - PROBLEM SELECTOR PLAN 2
.  -c/w Zyprexa 2.5mg IM q6h PRN for Agitation  -consider Zyprexa 2.5mg PO qhs    Can escalate to 5mg doses if needed. Zyprexa is better tolerated than Haldol especially in elderly population with dementia.

## 2022-01-25 NOTE — PROGRESS NOTE ADULT - ATTENDING COMMENTS
Initial attending contact date 1/21/22 . See fellow note written above for details. I reviewed the fellow documentation. I have personally seen and examined this patient. I reviewed vitals, labs, medications, cardiac studies, and additional imaging. I agree with the above fellow's findings and plans as written above with the following additions/statements.    88 yo female with Pmhx of Afib (on Xarelto) with SN dysfunction s/p PPM, dementia presents with failure to thrive with weakness and poor PO intake found to have large bowel obstruction found to be in Afib with RVR. Cardiology consulted for pre-op cardiac risk assessment  -now s/p endoscopic stent  -On tele: maintaining NSR  -Cont amio drip @ . 5mg/min for now, can transition to amio 200 mg qd when able to tolerate po   -Prelim ECHO with hyperdynamic LVEF, LVH.  -AC when cleared by primary team. CHADSVASC2  = 3, no need for bridging with IV heparin while holding AC  - K > 4, Mag > 2

## 2022-01-25 NOTE — PROGRESS NOTE ADULT - ATTENDING COMMENTS
No complaints.  Having BMs. On sips  AFVSS  Abd soft, ND, NT    hypokalemia  CT chest without metastases, but RLL pneumonia.    A/P: Obstructing rectosigmoid cancer s/p stent 1/21.  Pneumonia. A-fib controlled on amiodarone drip. Malnutrition.  1. Continue abx  2. Clear liquids and Ensure.  3. TPN  4. PT for OOB  5. Replete potassium.  Can transition to po amiodarone.  6. Will ask GI if completion colonoscopy planned on this admission; if not, then restart anticoagulation and advance diet quickly to low residue.  7. Discussed with daughter Blanca yesterday - need for urgent operation has subsided with successful stenting.  In the absence of widespread metastatic disease, she is unlikely to succumb to her cancer in then next 2 years, and stenting may not be a viable long-term option.  She is not medically or nutritionally optimized for resection yet, and would benefit from rehab or being home to recover.  8. d/c johnson.

## 2022-01-25 NOTE — PROGRESS NOTE ADULT - PROBLEM SELECTOR PLAN 5
.  Patient is Full Code  -daughter (Blanca) is [facility], [phone]  -daughter will be present tomorrow so will assist with communication and planning

## 2022-01-25 NOTE — PROGRESS NOTE ADULT - ASSESSMENT
88 yo female PMH of dementia, Afib (Xarelto, Last taken Tuesday) PPM (Indicated for tachy-yudith syndrome), cerebral LICA paraclinoid aneurysm (s/p stenting). Admitted for abdominal pain and 5 days of poor to none oral intake. CT scan done during this admission shows large bowel obstruction, narrowing of the rectum 9-12cm from the anal verge now s/p rectal stent. Impression is large bowel obstruction, differential includes malignancy vs infectious/inflammatory. Today went to Afib with RVR, home betablocker not restarted. Required multiple pushes of betablocker IV, calcium channel blocker and amiodarone.  Now stable on amio drip. Asymptomatic and hemodynamically stable, no need of pressors. Abdomen non-peritonitic. Hemoglobin decreased 2 units, likely 2/2 heomconcentration and now stable. Pt has begun to have bowel movements and tolerating sips of water.    Home meds as appropriate  Pain/nausea PRN  Amio gtt for AFib w/ RVR  Cards c/s  PICC/TPN, NPO  ISS  Reyes  Ceftriaxone(1/21-1/26), s/p Azithromycin (1/21-1/24)  Palliative c/s  AM labs

## 2022-01-25 NOTE — PROGRESS NOTE ADULT - SUBJECTIVE AND OBJECTIVE BOX
Interval Hpi: Patient stepped down to the RMF. Her potassium remains critically low    PAST MEDICAL & SURGICAL HISTORY:  Glaucoma  Osteoarthritis  Syncope and collapse  PVC (premature ventricular contraction)  Cerebral aneurysm  History of loop recorder  History of cholecystectomy    Home Medications:  XARELTO 20MG TABLETS: TAKE 1 TABLET BY MOUTH DAILY (20 Jan 2022 22:01)      MEDICATIONS  (STANDING):  aMIOdarone    Tablet 400 milliGRAM(s) Oral two times a day  cefTRIAXone   IVPB 1000 milliGRAM(s) IV Intermittent every 24 hours  chlorhexidine 2% Cloths 1 Application(s) Topical <User Schedule>  fat emulsion (Fish Oil and Plant Based) 20% Infusion 1.19 Gm/kG/Day (21 mL/Hr) IV Continuous <Continuous>  fat emulsion (Fish Oil and Plant Based) 20% Infusion 1 Gm/kG/Day (17.6 mL/Hr) IV Continuous <Continuous>  heparin   Injectable 5000 Unit(s) SubCutaneous every 12 hours  latanoprost 0.005% Ophthalmic Solution 1 Drop(s) Both EYES at bedtime  Parenteral Nutrition - Adult 1 Each (50 mL/Hr) TPN Continuous <Continuous>  Parenteral Nutrition - Adult 1 Each (50 mL/Hr) TPN Continuous <Continuous>  polyethylene glycol 3350 17 Gram(s) Oral daily  potassium chloride    Tablet ER 40 milliEquivalent(s) Oral every 4 hours  potassium chloride  10 mEq/100 mL IVPB 10 milliEquivalent(s) IV Intermittent every 1 hour  potassium phosphate IVPB 30 milliMole(s) IV Intermittent once    MEDICATIONS  (PRN):  OLANZapine Injectable 2.5 milliGRAM(s) IntraMuscular every 6 hours PRN agitation  sodium chloride 0.9% lock flush 10 milliLiter(s) IV Push every 1 hour PRN Pre/post blood products, medications, blood draw, and to maintain line patency      .  VITAL SIGNS:  T(C): 36.6 (01-25-22 @ 05:10), Max: 36.9 (01-24-22 @ 15:35)  T(F): 97.9 (01-25-22 @ 05:10), Max: 98.4 (01-24-22 @ 15:35)  HR: 90 (01-25-22 @ 05:10) (80 - 94)  BP: 131/61 (01-25-22 @ 05:10) (130/61 - 161/70)  BP(mean): 96 (01-24-22 @ 14:20) (87 - 96)  RR: 18 (01-25-22 @ 05:10) (17 - 29)  SpO2: 98% (01-25-22 @ 05:10) (94% - 100%)  Wt(kg): --    PHYSICAL EXAM:     Constitutional: WDWN resting comfortably in bed; NAD  Head: NC/AT  ENT:  MMM  Neck: supple; no JVD   Respiratory: CTA B/L; no W/R/R,   Cardiac: +S1/S2; RRR; no M/R/G;   Extremities: WWP, no clubbing or cyanosis; no peripheral edema  Vascular: 2+ radial, DP/PT pulses B/L  Neurologic: AAOx2; CNII-XII grossly intact; no focal deficits      < from: TTE Echo Complete w/o Contrast w/ Doppler (01.21.22 @ 12:01) >   1. Patient was tachycardicduring the study.   2. The left ventricle is normal in size and low normal systolic function   with a calculated ejection fraction of 55%.   3. Normal right ventricular size and systolic function.   4. Aortic sclerosis without significant stenosis.  5. No evidence of pulmonary hypertension, pulmonary artery systolic   pressure is 16 mmHg.   6. Trivial pericardial effusion.    < end of copied text >

## 2022-01-25 NOTE — PROGRESS NOTE ADULT - SUBJECTIVE AND OBJECTIVE BOX
GASTROENTEROLOGY PROGRESS NOTE  Patient seen and examined at bedside. Doing well with good BMs and no abd distension    PERTINENT REVIEW OF SYSTEMS:  As noted above    Allergies    No Known Allergies    Intolerances      MEDICATIONS:  MEDICATIONS  (STANDING):  aMIOdarone    Tablet 200 milliGRAM(s) Oral daily  chlorhexidine 2% Cloths 1 Application(s) Topical <User Schedule>  fat emulsion (Fish Oil and Plant Based) 20% Infusion 1.182 Gm/kG/Day (20.83 mL/Hr) IV Continuous <Continuous>  heparin   Injectable 5000 Unit(s) SubCutaneous every 12 hours  latanoprost 0.005% Ophthalmic Solution 1 Drop(s) Both EYES at bedtime  Parenteral Nutrition - Adult 1 Each (50 mL/Hr) TPN Continuous <Continuous>  Parenteral Nutrition - Adult 1 Each (50 mL/Hr) TPN Continuous <Continuous>  potassium chloride  20 mEq/100 mL IVPB 20 milliEquivalent(s) IV Intermittent every 2 hours    MEDICATIONS  (PRN):  OLANZapine Injectable 2.5 milliGRAM(s) IntraMuscular every 6 hours PRN agitation  sodium chloride 0.9% lock flush 10 milliLiter(s) IV Push every 1 hour PRN Pre/post blood products, medications, blood draw, and to maintain line patency    Vital Signs Last 24 Hrs  T(C): 36 (25 Jan 2022 16:25), Max: 36.6 (25 Jan 2022 01:04)  T(F): 96.8 (25 Jan 2022 16:25), Max: 97.9 (25 Jan 2022 05:10)  HR: 85 (25 Jan 2022 16:25) (84 - 115)  BP: 114/56 (25 Jan 2022 16:25) (114/56 - 140/77)  BP(mean): --  RR: 20 (25 Jan 2022 16:25) (17 - 20)  SpO2: 96% (25 Jan 2022 16:25) (94% - 100%)    01-24 @ 07:01  -  01-25 @ 07:00  --------------------------------------------------------  IN: 2910.8 mL / OUT: 1598 mL / NET: 1312.8 mL      PHYSICAL EXAM:    General: Well developed; in no acute distress  HEENT: MMM, conjunctiva and sclera clear  Gastrointestinal: Soft non-tender non-distended; No rebound or guarding  Skin: Warm and dry. No obvious rash    LABS:                        9.2    8.32  )-----------( 220      ( 25 Jan 2022 06:59 )             28.5     01-25    139  |  101  |  12  ----------------------------<  188<H>  2.5<LL>   |  29  |  0.51    Ca    7.7<L>      25 Jan 2022 15:54  Phos  3.4     01-25  Mg     2.0     01-25    TPro  4.7<L>  /  Alb  2.3<L>  /  TBili  0.2  /  DBili  x   /  AST  13  /  ALT  6<L>  /  AlkPhos  64  01-24                      RADIOLOGY & ADDITIONAL STUDIES:  Reviewed
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
## 2022-01-25 NOTE — PROGRESS NOTE ADULT - SUBJECTIVE AND OBJECTIVE BOX
SUBJECTIVE: Doing well this AM. NAEON. Denies n/v. Endorses f/bm. Denies cp/sob. Denies abd pain. Tolerating sips.      MEDICATIONS  (STANDING):  aMIOdarone Infusion 0.501 mG/Min (16.7 mL/Hr) IV Continuous <Continuous>  cefTRIAXone   IVPB 1000 milliGRAM(s) IV Intermittent every 24 hours  chlorhexidine 2% Cloths 1 Application(s) Topical <User Schedule>  fat emulsion (Fish Oil and Plant Based) 20% Infusion 1.19 Gm/kG/Day (21 mL/Hr) IV Continuous <Continuous>  heparin   Injectable 5000 Unit(s) SubCutaneous every 12 hours  latanoprost 0.005% Ophthalmic Solution 1 Drop(s) Both EYES at bedtime  Parenteral Nutrition - Adult 1 Each (50 mL/Hr) TPN Continuous <Continuous>  polyethylene glycol 3350 17 Gram(s) Oral daily    MEDICATIONS  (PRN):  OLANZapine Injectable 2.5 milliGRAM(s) IntraMuscular every 6 hours PRN agitation  sodium chloride 0.9% lock flush 10 milliLiter(s) IV Push every 1 hour PRN Pre/post blood products, medications, blood draw, and to maintain line patency      Vital Signs Last 24 Hrs  T(C): 36.6 (25 Jan 2022 05:10), Max: 36.9 (24 Jan 2022 15:35)  T(F): 97.9 (25 Jan 2022 05:10), Max: 98.4 (24 Jan 2022 15:35)  HR: 90 (25 Jan 2022 05:10) (80 - 94)  BP: 131/61 (25 Jan 2022 05:10) (121/59 - 161/70)  BP(mean): 96 (24 Jan 2022 14:20) (85 - 96)  RR: 18 (25 Jan 2022 05:10) (17 - 29)  SpO2: 98% (25 Jan 2022 05:10) (94% - 100%)    Physical Exam:  General: NAD, resting comfortably in bed  Pulmonary: Nonlabored breathing, no respiratory distress  Cardiovascular: NSR  Abdominal: soft, NT/ND  Extremities: WWP, normal strength  Neuro: A/O x 3, CNs II-XII grossly intact, no focal deficits    I&O's Summary    24 Jan 2022 07:01  -  25 Jan 2022 07:00  --------------------------------------------------------  IN: 2910.8 mL / OUT: 1598 mL / NET: 1312.8 mL        LABS:                        9.2    8.32  )-----------( 220      ( 25 Jan 2022 06:59 )             28.5     01-25    137  |  99  |  8   ----------------------------<  x   x    |  27  |  0.56    Ca    8.5      24 Jan 2022 05:44  Phos  3.0     01-24  Mg     1.9     01-25    TPro  4.7<L>  /  Alb  2.3<L>  /  TBili  0.2  /  DBili  x   /  AST  13  /  ALT  6<L>  /  AlkPhos  64  01-24        CAPILLARY BLOOD GLUCOSE        LIVER FUNCTIONS - ( 24 Jan 2022 05:44 )  Alb: 2.3 g/dL / Pro: 4.7 g/dL / ALK PHOS: 64 U/L / ALT: 6 U/L / AST: 13 U/L / GGT: x             RADIOLOGY & ADDITIONAL STUDIES:

## 2022-01-25 NOTE — PROGRESS NOTE ADULT - PROBLEM SELECTOR PLAN 4
.  s/p colonic stent with good response  -no evidence of metastatic disease on imaging  -surgical resection could be curative if patient is optimized for surgery  -tentative plan for complete colonoscopy with GI

## 2022-01-25 NOTE — PROGRESS NOTE ADULT - ASSESSMENT
88 yo F PMH Dementia, Afib who presented with weakness, abdominal pain, and decreased appetite. Palliative Medicine consulted for complex decision making and to determin overall goals of care in setting of rectal mass.    ·	patient endorsing headache, abdominal cramps, and nausea today: recommend Tylenol Suspension 650mg PO q8h ATC x1-2 days (since she does not advocate well for herself) and Zofran 4mg IV q6h PRN for Nausea/Vomiting  ·	daughters are planned to visit tomorrow, will assist with GOC and advanced planning 88 yo F PMH Dementia, Afib who presented with weakness, abdominal pain, and decreased appetite. Palliative Medicine consulted for complex decision making and to determin overall goals of care in setting of rectal mass.    ·	patient endorsing headache, abdominal cramps, and nausea today  ·	recommend Tylenol Suspension 650mg PO q8h ATC x1-2 days (since she does not advocate well for herself) and Zofran 4mg IV q6h PRN for Nausea/Vomiting  ·	daughters are planned to visit tomorrow, will assist with GOC and advanced planning 88 yo F PMH Dementia, Afib who presented with weakness, abdominal pain, and decreased appetite. Palliative Medicine consulted for complex decision making and to determin overall goals of care in setting of rectal mass.    ·	patient endorsing headache, abdominal cramps, and nausea today  ·	recommend Tylenol Suspension 650mg PO q8h ATC x1-2 days (since she does not advocate well for herself) and Zofran 4mg IV q6h PRN for Nausea/Vomiting  ·	consider Zyprexa 2.5mg PO qhs to prevent recurrent delirium/sundowning (will also help with nausea)  ·	daughters are planned to visit tomorrow, will assist with GOC and advanced planning

## 2022-01-25 NOTE — PROGRESS NOTE ADULT - PROBLEM SELECTOR PLAN 6
.  Complex medical decision making / symptom management in the setting of advanced illness.    Will continue to follow for ongoing GOC discussion / titration of palliative regimen.   Emotional support provided, questions answered.  Active Psychosocial Referrals:  [x]Social Work/Case management [x]PT/OT []Chaplaincy []Hospice  [x]Patient/Family Support []Holistic RN []Massage Therapy []Ethics  Coping: [x] well [] with difficulty [] poor coping [] unable to assess  Support system: [] strong [x] adequate [] inadequate    For new or uncontrolled symptoms, please call Palliative Care at [phone]. The service is available 24/7 (including nights & weekends) to provide symptom management recommendations over the phone as appropriate

## 2022-01-26 LAB
ANION GAP SERPL CALC-SCNC: 8 MMOL/L — SIGNIFICANT CHANGE UP (ref 5–17)
ANION GAP SERPL CALC-SCNC: 9 MMOL/L — SIGNIFICANT CHANGE UP (ref 5–17)
BUN SERPL-MCNC: 14 MG/DL — SIGNIFICANT CHANGE UP (ref 7–23)
BUN SERPL-MCNC: 14 MG/DL — SIGNIFICANT CHANGE UP (ref 7–23)
CALCIUM SERPL-MCNC: 7.9 MG/DL — LOW (ref 8.4–10.5)
CALCIUM SERPL-MCNC: 8 MG/DL — LOW (ref 8.4–10.5)
CHLORIDE SERPL-SCNC: 102 MMOL/L — SIGNIFICANT CHANGE UP (ref 96–108)
CHLORIDE SERPL-SCNC: 106 MMOL/L — SIGNIFICANT CHANGE UP (ref 96–108)
CO2 SERPL-SCNC: 28 MMOL/L — SIGNIFICANT CHANGE UP (ref 22–31)
CO2 SERPL-SCNC: 30 MMOL/L — SIGNIFICANT CHANGE UP (ref 22–31)
CREAT SERPL-MCNC: 0.52 MG/DL — SIGNIFICANT CHANGE UP (ref 0.5–1.3)
CREAT SERPL-MCNC: 0.52 MG/DL — SIGNIFICANT CHANGE UP (ref 0.5–1.3)
CULTURE RESULTS: SIGNIFICANT CHANGE UP
CULTURE RESULTS: SIGNIFICANT CHANGE UP
GLUCOSE SERPL-MCNC: 135 MG/DL — HIGH (ref 70–99)
GLUCOSE SERPL-MCNC: 139 MG/DL — HIGH (ref 70–99)
HCT VFR BLD CALC: 30.2 % — LOW (ref 34.5–45)
HGB BLD-MCNC: 9.1 G/DL — LOW (ref 11.5–15.5)
MAGNESIUM SERPL-MCNC: 2 MG/DL — SIGNIFICANT CHANGE UP (ref 1.6–2.6)
MAGNESIUM SERPL-MCNC: 2 MG/DL — SIGNIFICANT CHANGE UP (ref 1.6–2.6)
MCHC RBC-ENTMCNC: 27.2 PG — SIGNIFICANT CHANGE UP (ref 27–34)
MCHC RBC-ENTMCNC: 30.1 GM/DL — LOW (ref 32–36)
MCV RBC AUTO: 90.1 FL — SIGNIFICANT CHANGE UP (ref 80–100)
NRBC # BLD: 0 /100 WBCS — SIGNIFICANT CHANGE UP (ref 0–0)
PHOSPHATE SERPL-MCNC: 2.6 MG/DL — SIGNIFICANT CHANGE UP (ref 2.5–4.5)
PHOSPHATE SERPL-MCNC: 3.8 MG/DL — SIGNIFICANT CHANGE UP (ref 2.5–4.5)
PLATELET # BLD AUTO: 242 K/UL — SIGNIFICANT CHANGE UP (ref 150–400)
POTASSIUM SERPL-MCNC: 3 MMOL/L — LOW (ref 3.5–5.3)
POTASSIUM SERPL-MCNC: 4 MMOL/L — SIGNIFICANT CHANGE UP (ref 3.5–5.3)
POTASSIUM SERPL-SCNC: 3 MMOL/L — LOW (ref 3.5–5.3)
POTASSIUM SERPL-SCNC: 4 MMOL/L — SIGNIFICANT CHANGE UP (ref 3.5–5.3)
RBC # BLD: 3.35 M/UL — LOW (ref 3.8–5.2)
RBC # FLD: 14.2 % — SIGNIFICANT CHANGE UP (ref 10.3–14.5)
SARS-COV-2 RNA SPEC QL NAA+PROBE: SIGNIFICANT CHANGE UP
SODIUM SERPL-SCNC: 140 MMOL/L — SIGNIFICANT CHANGE UP (ref 135–145)
SODIUM SERPL-SCNC: 143 MMOL/L — SIGNIFICANT CHANGE UP (ref 135–145)
SPECIMEN SOURCE: SIGNIFICANT CHANGE UP
SPECIMEN SOURCE: SIGNIFICANT CHANGE UP
WBC # BLD: 8.21 K/UL — SIGNIFICANT CHANGE UP (ref 3.8–10.5)
WBC # FLD AUTO: 8.21 K/UL — SIGNIFICANT CHANGE UP (ref 3.8–10.5)

## 2022-01-26 PROCEDURE — 99233 SBSQ HOSP IP/OBS HIGH 50: CPT

## 2022-01-26 PROCEDURE — 99232 SBSQ HOSP IP/OBS MODERATE 35: CPT | Mod: GC

## 2022-01-26 PROCEDURE — 93010 ELECTROCARDIOGRAM REPORT: CPT

## 2022-01-26 PROCEDURE — 99232 SBSQ HOSP IP/OBS MODERATE 35: CPT

## 2022-01-26 RX ORDER — ELECTROLYTE SOLUTION,INJ
1 VIAL (ML) INTRAVENOUS
Refills: 0 | Status: DISCONTINUED | OUTPATIENT
Start: 2022-01-26 | End: 2022-01-26

## 2022-01-26 RX ORDER — POTASSIUM CHLORIDE 20 MEQ
10 PACKET (EA) ORAL
Refills: 0 | Status: COMPLETED | OUTPATIENT
Start: 2022-01-26 | End: 2022-01-26

## 2022-01-26 RX ORDER — ENOXAPARIN SODIUM 100 MG/ML
30 INJECTION SUBCUTANEOUS EVERY 24 HOURS
Refills: 0 | Status: DISCONTINUED | OUTPATIENT
Start: 2022-01-26 | End: 2022-01-26

## 2022-01-26 RX ORDER — I.V. FAT EMULSION 20 G/100ML
1.18 EMULSION INTRAVENOUS
Qty: 50 | Refills: 0 | Status: DISCONTINUED | OUTPATIENT
Start: 2022-01-26 | End: 2022-01-26

## 2022-01-26 RX ORDER — SODIUM CHLORIDE 9 MG/ML
500 INJECTION, SOLUTION INTRAVENOUS ONCE
Refills: 0 | Status: COMPLETED | OUTPATIENT
Start: 2022-01-26 | End: 2022-01-26

## 2022-01-26 RX ORDER — POTASSIUM CHLORIDE 20 MEQ
40 PACKET (EA) ORAL ONCE
Refills: 0 | Status: COMPLETED | OUTPATIENT
Start: 2022-01-26 | End: 2022-01-26

## 2022-01-26 RX ORDER — METOPROLOL TARTRATE 50 MG
25 TABLET ORAL DAILY
Refills: 0 | Status: DISCONTINUED | OUTPATIENT
Start: 2022-01-26 | End: 2022-01-28

## 2022-01-26 RX ORDER — POTASSIUM PHOSPHATE, MONOBASIC POTASSIUM PHOSPHATE, DIBASIC 236; 224 MG/ML; MG/ML
30 INJECTION, SOLUTION INTRAVENOUS ONCE
Refills: 0 | Status: COMPLETED | OUTPATIENT
Start: 2022-01-26 | End: 2022-01-26

## 2022-01-26 RX ORDER — OLANZAPINE 15 MG/1
5 TABLET, FILM COATED ORAL AT BEDTIME
Refills: 0 | Status: DISCONTINUED | OUTPATIENT
Start: 2022-01-26 | End: 2022-02-04

## 2022-01-26 RX ORDER — MAGNESIUM SULFATE 500 MG/ML
1 VIAL (ML) INJECTION ONCE
Refills: 0 | Status: DISCONTINUED | OUTPATIENT
Start: 2022-01-26 | End: 2022-01-26

## 2022-01-26 RX ORDER — METOPROLOL TARTRATE 50 MG
5 TABLET ORAL ONCE
Refills: 0 | Status: DISCONTINUED | OUTPATIENT
Start: 2022-01-26 | End: 2022-01-26

## 2022-01-26 RX ADMIN — POTASSIUM PHOSPHATE, MONOBASIC POTASSIUM PHOSPHATE, DIBASIC 83.33 MILLIMOLE(S): 236; 224 INJECTION, SOLUTION INTRAVENOUS at 13:00

## 2022-01-26 RX ADMIN — HEPARIN SODIUM 5000 UNIT(S): 5000 INJECTION INTRAVENOUS; SUBCUTANEOUS at 06:10

## 2022-01-26 RX ADMIN — Medication 40 MILLIEQUIVALENT(S): at 09:06

## 2022-01-26 RX ADMIN — CHLORHEXIDINE GLUCONATE 1 APPLICATION(S): 213 SOLUTION TOPICAL at 06:15

## 2022-01-26 RX ADMIN — Medication 25 MILLIGRAM(S): at 18:06

## 2022-01-26 RX ADMIN — AMIODARONE HYDROCHLORIDE 200 MILLIGRAM(S): 400 TABLET ORAL at 06:10

## 2022-01-26 RX ADMIN — Medication 100 MILLIEQUIVALENT(S): at 07:10

## 2022-01-26 RX ADMIN — Medication 100 MILLIEQUIVALENT(S): at 09:02

## 2022-01-26 RX ADMIN — SODIUM CHLORIDE 100 MILLILITER(S): 9 INJECTION, SOLUTION INTRAVENOUS at 18:07

## 2022-01-26 RX ADMIN — Medication 1 EACH: at 18:06

## 2022-01-26 RX ADMIN — Medication 100 MILLIEQUIVALENT(S): at 11:25

## 2022-01-26 RX ADMIN — ENOXAPARIN SODIUM 30 MILLIGRAM(S): 100 INJECTION SUBCUTANEOUS at 07:10

## 2022-01-26 RX ADMIN — I.V. FAT EMULSION 20.8 GM/KG/DAY: 20 EMULSION INTRAVENOUS at 22:56

## 2022-01-26 NOTE — PROGRESS NOTE ADULT - ASSESSMENT
86 yo F PMH Dementia, Afib who presented with weakness, abdominal pain, and decreased appetite. Palliative Medicine consulted for complex decision making and to determin overall goals of care in setting of rectal mass.    ·	Given patient's recurrent issue with agitation overnight, recommend standing Zyprexa 5mg PO qhs while she remains inpatient (can discontinue prior to discharge)

## 2022-01-26 NOTE — PROGRESS NOTE ADULT - ATTENDING COMMENTS
No complaints. Having loose BMs with Golytely.  Received multiple runs of IV and po potassium yesterday.  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  Hypokalemia.  Malnutrition. A-fib with RVR., controlled with amiodarone. RLL pneumonia.  1. Continue bowel prep  2. Replete potassium.  3. Clear liquids, Ensure.  TPN until colonoscopy, then advance diet.  4. OOB with PT  5. Abx for pneumonia to stop today  6. On Lovenox prophylactic dose.  After colonoscopy can resume Xarelto.  7. discharge planning No complaints. Having loose BMs with Golytely.  Received multiple runs of IV and po potassium yesterday.  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  Hypokalemia.  Malnutrition. A-fib with RVR., controlled with amiodarone. RLL pneumonia.  1. Continue bowel prep  2. Replete potassium.  3. Clear liquids, Ensure.  TPN until colonoscopy, then advance diet.  4. OOB with PT  5. Abx for pneumonia to stop today  6. On Lovenox prophylactic dose.  After colonoscopy can resume Xarelto.  7. discharge planning    Addendum: Spoke with Blanca (daughter) to update her.  She is planning for rehab.  She confirms that Ms. Ulloa has problems with fecal incontinence.  I discussed colostomy without anastomosis as an option that is safer and with better QOL.

## 2022-01-26 NOTE — PROGRESS NOTE ADULT - SUBJECTIVE AND OBJECTIVE BOX
SUBJECTIVE: Feeling well, having multiple bowel movements. Patient seen and examined bedside by chief resident.    aMIOdarone    Tablet 200 milliGRAM(s) Oral daily  enoxaparin Injectable 30 milliGRAM(s) SubCutaneous every 24 hours    MEDICATIONS  (PRN):  OLANZapine Injectable 2.5 milliGRAM(s) IntraMuscular every 6 hours PRN agitation  sodium chloride 0.9% lock flush 10 milliLiter(s) IV Push every 1 hour PRN Pre/post blood products, medications, blood draw, and to maintain line patency      I&O's Detail    25 Jan 2022 07:01  -  26 Jan 2022 07:00  --------------------------------------------------------  IN:    Fat Emulsion (Fish Oil &amp; Plant Based) 20% Infusion: 84 mL    Fat Emulsion (Fish Oil &amp; Plant Based) 20% Infusion: 249.6 mL    IV PiggyBack: 400 mL    IV PiggyBack: 50 mL    TPN (Total Parenteral Nutrition): 1050 mL  Total IN: 1833.6 mL    OUT:  Total OUT: 0 mL    Total NET: 1833.6 mL          Vital Signs Last 24 Hrs  T(C): 36.3 (26 Jan 2022 05:50), Max: 36.6 (25 Jan 2022 09:31)  T(F): 97.3 (26 Jan 2022 05:50), Max: 97.9 (25 Jan 2022 21:30)  HR: 104 (26 Jan 2022 05:50) (84 - 115)  BP: 117/59 (26 Jan 2022 05:50) (114/56 - 147/75)  BP(mean): --  RR: 16 (26 Jan 2022 05:50) (16 - 20)  SpO2: 98% (26 Jan 2022 05:50) (96% - 100%)    General: NAD, resting comfortably in bed  C/V: NSR  Pulm: Nonlabored breathing, no respiratory distress  Abd: soft, NT/ND  Extrem: SCDs in place    LABS:                        9.1    8.21  )-----------( 242      ( 26 Jan 2022 05:33 )             30.2     01-26    140  |  102  |  14  ----------------------------<  139<H>  3.0<L>   |  30  |  0.52    Ca    8.0<L>      26 Jan 2022 05:33  Phos  2.6     01-26  Mg     2.0     01-26

## 2022-01-26 NOTE — PROCEDURE NOTE - NSINDICATIONS_GEN_A_CORE
strict intake/output during critical illness/urinry obstruction or retention
antibiotic therapy/Total Parenteral Nutrition/TPN

## 2022-01-26 NOTE — PROGRESS NOTE ADULT - PROBLEM SELECTOR PLAN 4
.  s/p colonic stent with good response  -no evidence of metastatic disease on imaging  -surgical resection could be curative if patient is optimized for surgery  -tentative plan for complete colonoscopy with GI .  s/p colonic stent with good response  -no evidence of metastatic disease on imaging  -surgical resection could be curative if patient is optimized for surgery  -tentative plan for complete colonoscopy with GI tomorrow

## 2022-01-26 NOTE — PROGRESS NOTE ADULT - SUBJECTIVE AND OBJECTIVE BOX
GASTROENTEROLOGY PROGRESS NOTE  Patient seen and examined at bedside.    PERTINENT REVIEW OF SYSTEMS:  As noted above    Allergies    No Known Allergies    Intolerances      MEDICATIONS:  MEDICATIONS  (STANDING):  aMIOdarone    Tablet 200 milliGRAM(s) Oral daily  chlorhexidine 2% Cloths 1 Application(s) Topical <User Schedule>  fat emulsion (Fish Oil and Plant Based) 20% Infusion 1.182 Gm/kG/Day (20.8 mL/Hr) IV Continuous <Continuous>  latanoprost 0.005% Ophthalmic Solution 1 Drop(s) Both EYES at bedtime  Parenteral Nutrition - Adult 1 Each (50 mL/Hr) TPN Continuous <Continuous>  Parenteral Nutrition - Adult 1 Each (50 mL/Hr) TPN Continuous <Continuous>  potassium phosphate IVPB 30 milliMole(s) IV Intermittent once    MEDICATIONS  (PRN):  OLANZapine Injectable 2.5 milliGRAM(s) IntraMuscular every 6 hours PRN agitation  sodium chloride 0.9% lock flush 10 milliLiter(s) IV Push every 1 hour PRN Pre/post blood products, medications, blood draw, and to maintain line patency    Vital Signs Last 24 Hrs  T(C): 36.8 (26 Jan 2022 09:09), Max: 36.8 (26 Jan 2022 09:09)  T(F): 98.3 (26 Jan 2022 09:09), Max: 98.3 (26 Jan 2022 09:09)  HR: 120 (26 Jan 2022 12:22) (85 - 129)  BP: 125/60 (26 Jan 2022 12:22) (114/56 - 156/79)  BP(mean): --  RR: 20 (26 Jan 2022 12:22) (16 - 22)  SpO2: 97% (26 Jan 2022 12:22) (96% - 98%)    01-25 @ 07:01  -  01-26 @ 07:00  --------------------------------------------------------  IN: 1833.6 mL / OUT: 0 mL / NET: 1833.6 mL      PHYSICAL EXAM:    General: Well developed, elderly  HEENT: MMM, conjunctiva and sclera clear  Gastrointestinal: Soft non-tender non-distended; No rebound or guarding  Skin: Warm and dry. No obvious rash    LABS:                        9.1    8.21  )-----------( 242      ( 26 Jan 2022 05:33 )             30.2     01-26    140  |  102  |  14  ----------------------------<  139<H>  3.0<L>   |  30  |  0.52    Ca    8.0<L>      26 Jan 2022 05:33  Phos  2.6     01-26  Mg     2.0     01-26                        RADIOLOGY & ADDITIONAL STUDIES:  Reviewed

## 2022-01-26 NOTE — CONSULT NOTE ADULT - SUBJECTIVE AND OBJECTIVE BOX
NEUROSURGERY CONSULT NOTE    HISTORY OF PRESENT ILLNESS:   86 yo female PMH of dementia, Afib (Xarelto, Last taken Tuesday) PPM (Indicated for tachy-yudith syndrome), cerebral LICA paraclinoid aneurysm (s/p stenting). Admitted for abdominal pain and 5 days of poor to none oral intake. CT scan done during this admission shows large bowel obstruction, narrowing of the rectum 9-12cm from the anal verge now s/p rectal stent. Neurosurgery consulted for a lumbar compression deformity found on CT abdomen    PAST MEDICAL & SURGICAL HISTORY:  Glaucoma    Osteoarthritis    Syncope and collapse    PVC (premature ventricular contraction)    Cerebral aneurysm    History of loop recorder    History of cholecystectomy      FAMILY HISTORY:      SOCIAL HISTORY:  Tobacco Use:  EtOH use:   Substance:    Allergies    No Known Allergies    Intolerances        REVIEW OF SYSTEMS  General:	  Skin/Breast:  Ophthalmologic:  ENMT:	  Respiratory and Thorax:  Cardiovascular:	  Gastrointestinal:	  Genitourinary:	  Musculoskeletal:	 +LOWER BACK PAIN  Neurological:	  Psychiatric:	  Hematology/Lymphatics:	  Endocrine:	  Allergic/Immunologic:	      MEDICATIONS:  Antibiotics:    Neuro:  OLANZapine Injectable 2.5 milliGRAM(s) IntraMuscular every 6 hours PRN    Anticoagulation:  enoxaparin Injectable 30 milliGRAM(s) SubCutaneous every 24 hours    OTHER:  aMIOdarone    Tablet 200 milliGRAM(s) Oral daily  chlorhexidine 2% Cloths 1 Application(s) Topical <User Schedule>  latanoprost 0.005% Ophthalmic Solution 1 Drop(s) Both EYES at bedtime    IVF:  fat emulsion (Fish Oil and Plant Based) 20% Infusion 1.182 Gm/kG/Day IV Continuous <Continuous>  Parenteral Nutrition - Adult 1 Each TPN Continuous <Continuous>  Parenteral Nutrition - Adult 1 Each TPN Continuous <Continuous>  potassium phosphate IVPB 30 milliMole(s) IV Intermittent once  sodium chloride 0.9% lock flush 10 milliLiter(s) IV Push every 1 hour PRN      Vital Signs Last 24 Hrs  T(C): 36.8 (26 Jan 2022 09:09), Max: 36.8 (26 Jan 2022 09:09)  T(F): 98.3 (26 Jan 2022 09:09), Max: 98.3 (26 Jan 2022 09:09)  HR: 106 (26 Jan 2022 09:09) (84 - 109)  BP: 125/56 (26 Jan 2022 09:09) (114/56 - 147/75)  BP(mean): --  RR: 22 (26 Jan 2022 09:09) (16 - 22)  SpO2: 96% (26 Jan 2022 09:09) (96% - 100%)    PHYSICAL EXAM:  Constitutional: laying in bed, cachectic appearing  Eyes: 2mm briskly reactive to light   Neurological: uncooperative during focused neurological exam. moving her lower extremities spontaneously     LABS:                        9.1    8.21  )-----------( 242      ( 26 Jan 2022 05:33 )             30.2     01-26    140  |  102  |  14  ----------------------------<  139<H>  3.0<L>   |  30  |  0.52    Ca    8.0<L>      26 Jan 2022 05:33  Phos  2.6     01-26  Mg     2.0     01-26          CULTURES:  Culture Results:   No Growth Final (01-21 @ 00:53)  Culture Results:   No Growth Final (01-21 @ 00:51)      RADIOLOGY & ADDITIONAL STUDIES:  CT Abdomen and Pelvis w/ IV Cont (01.20.22 @ 15:45)    IMPRESSION:  1.  Suspect obstructing annular high rectal mass.  2.  Multiple groundglass airspace opacities in the visualized right lower   and middle lobes with small dense consolidation in the right lower lobe,   possibly infectious or inflammatory/aspiration.  3.  Prominent fluid filled bladder. Correlate clinically with retention.  4.  Severe L2 vertebral body compression deformity and retropulsed   posterior fragment moderately narrowing the central canal at this level.      PLAN:  - MRI of the lumbar spine  - TLSO brace (this can be ordered through [facility] prosthetic and orthotic labs @ [phone])    d/w Dr. Lopez      NEUROSURGERY CONSULT NOTE    HISTORY OF PRESENT ILLNESS:   86 yo female PMH of dementia, Afib (Xarelto, Last taken Tuesday) PPM (Indicated for tachy-yudith syndrome), cerebral LICA paraclinoid aneurysm (s/p stenting). Admitted for abdominal pain and 5 days of poor to none oral intake. CT scan done during this admission shows large bowel obstruction, narrowing of the rectum 9-12cm from the anal verge now s/p rectal stent. Neurosurgery consulted for a lumbar compression deformity found on CT abdomen    PAST MEDICAL & SURGICAL HISTORY:  Glaucoma    Osteoarthritis    Syncope and collapse    PVC (premature ventricular contraction)    Cerebral aneurysm    History of loop recorder    History of cholecystectomy      FAMILY HISTORY:      SOCIAL HISTORY:  Tobacco Use:  EtOH use:   Substance:    Allergies    No Known Allergies    Intolerances        REVIEW OF SYSTEMS  General:	  Skin/Breast:  Ophthalmologic:  ENMT:	  Respiratory and Thorax:  Cardiovascular:	  Gastrointestinal:	  Genitourinary:	  Musculoskeletal:	 +LOWER BACK PAIN  Neurological:	  Psychiatric:	  Hematology/Lymphatics:	  Endocrine:	  Allergic/Immunologic:	      MEDICATIONS:  Antibiotics:    Neuro:  OLANZapine Injectable 2.5 milliGRAM(s) IntraMuscular every 6 hours PRN    Anticoagulation:  enoxaparin Injectable 30 milliGRAM(s) SubCutaneous every 24 hours    OTHER:  aMIOdarone    Tablet 200 milliGRAM(s) Oral daily  chlorhexidine 2% Cloths 1 Application(s) Topical <User Schedule>  latanoprost 0.005% Ophthalmic Solution 1 Drop(s) Both EYES at bedtime    IVF:  fat emulsion (Fish Oil and Plant Based) 20% Infusion 1.182 Gm/kG/Day IV Continuous <Continuous>  Parenteral Nutrition - Adult 1 Each TPN Continuous <Continuous>  Parenteral Nutrition - Adult 1 Each TPN Continuous <Continuous>  potassium phosphate IVPB 30 milliMole(s) IV Intermittent once  sodium chloride 0.9% lock flush 10 milliLiter(s) IV Push every 1 hour PRN      Vital Signs Last 24 Hrs  T(C): 36.8 (26 Jan 2022 09:09), Max: 36.8 (26 Jan 2022 09:09)  T(F): 98.3 (26 Jan 2022 09:09), Max: 98.3 (26 Jan 2022 09:09)  HR: 106 (26 Jan 2022 09:09) (84 - 109)  BP: 125/56 (26 Jan 2022 09:09) (114/56 - 147/75)  BP(mean): --  RR: 22 (26 Jan 2022 09:09) (16 - 22)  SpO2: 96% (26 Jan 2022 09:09) (96% - 100%)    PHYSICAL EXAM:  Constitutional: laying in bed, cachectic appearing  Eyes: 2mm briskly reactive to light   Neurological: uncooperative during focused neurological exam. moving her lower extremities spontaneously     LABS:                        9.1    8.21  )-----------( 242      ( 26 Jan 2022 05:33 )             30.2     01-26    140  |  102  |  14  ----------------------------<  139<H>  3.0<L>   |  30  |  0.52    Ca    8.0<L>      26 Jan 2022 05:33  Phos  2.6     01-26  Mg     2.0     01-26          CULTURES:  Culture Results:   No Growth Final (01-21 @ 00:53)  Culture Results:   No Growth Final (01-21 @ 00:51)      RADIOLOGY & ADDITIONAL STUDIES:  CT Abdomen and Pelvis w/ IV Cont (01.20.22 @ 15:45)    IMPRESSION:  1.  Suspect obstructing annular high rectal mass.  2.  Multiple groundglass airspace opacities in the visualized right lower   and middle lobes with small dense consolidation in the right lower lobe,   possibly infectious or inflammatory/aspiration.  3.  Prominent fluid filled bladder. Correlate clinically with retention.  4.  Severe L2 vertebral body compression deformity and retropulsed   posterior fragment moderately narrowing the central canal at this level.      PLAN:  - MRI of the lumbar spine, can obtain CT myelogram if unable to tolerate MRI  - TLSO brace ([facility] @ [phone])    d/w Dr. Lopez

## 2022-01-26 NOTE — CONSULT NOTE ADULT - CONSULT REASON
vascular access
Internal Medicine Co-management
LBO
Pre-Op clearance, Cardiac Optimization
afib with RVR
lumbar compression deformity
GOC in setting of rectal mass

## 2022-01-26 NOTE — PROGRESS NOTE ADULT - PROBLEM SELECTOR PLAN 2
.  -c/w Zyprexa 2.5mg IM q6h PRN for Agitation  -consider Zyprexa 2.5mg PO qhs    Can escalate to 5mg doses if needed. Zyprexa is better tolerated than Haldol especially in elderly population with dementia. .  Not complaining of today  -consider Tylenol Suspension 650mg PO q8h ATC x1-2 days  -consider Zofran 4mg IV q6h PRN for Nausea/Vomiting    Since she does not advocate well for herself, scheduled medications may be helpful

## 2022-01-26 NOTE — PROGRESS NOTE ADULT - PROBLEM SELECTOR PLAN 5
.  Patient is Full Code  -daughter (Blanca) is [facility], [phone]  -daughter will be present tomorrow so will assist with communication and planning .  Patient is Full Code  -daughter (Blanca) is [facility], [phone]  -continued outreach to HCP for emotional support and medical updates

## 2022-01-26 NOTE — PROGRESS NOTE ADULT - ASSESSMENT
88 yo female with Pmhx of Afib (on Xarelto) with SN dysfunction s/p PPM, dementia presents with failure to thrive with weakness and poor PO intake found to have a large bowel obstruction, narrowing of the rectum 9-12cm from the anal verge, s/p Flex/SIg + Stenting with course notable for Afib with RVR now in NSR in sinus tach    1) Atrial Fibrillation with RVR in the setting of large bowel obstruction, now in NSR  -Patient with known Hx of Afib on Xarelto, however with active Eliquis prescription as well, here with large bowel obstruction  -Pt admitted with Afib RVR with -160 in NSR after amiodarone GTT infusion. Today patient in sinus tach on tele  -Echo showing preserved BIV function without valvular pathology  -Would Continue Amio 200 mg PO daily iand initiate Toprol 25 mg daily to maintain her in NSR  -As the patient is now in sinus tach and appears comfortable, would keep a low treshold for sepsis screening, GIB, PE etc  -Please ensure Potassium of 4 with IV and oral supplementation or more as patient at risk for repeat tachyarrhythmia Would keep Magnesium above 2 as well  -Would resumed AC when cleared by primary team given CHADSVASC2  = 3. Currently no need for bridging with IV heparin while holding AC  -Palliative consulted for further GOC discussion  -Please reconsult Cardiology as needed

## 2022-01-26 NOTE — PROGRESS NOTE ADULT - SUBJECTIVE AND OBJECTIVE BOX
Interval HPI: Patient now in sinus Tach; She is confused today and beleived I am her daughters friend      PAST MEDICAL & SURGICAL HISTORY:  Glaucoma  Osteoarthritis  Syncope and collapse  PVC (premature ventricular contraction)  Cerebral aneurysm  History of loop recorder  History of cholecystectomy      Home Medications:  XARELTO 20MG TABLETS: TAKE 1 TABLET BY MOUTH DAILY (20 Jan 2022 22:01)      MEDICATIONS  (STANDING):  aMIOdarone    Tablet 200 milliGRAM(s) Oral daily  chlorhexidine 2% Cloths 1 Application(s) Topical <User Schedule>  fat emulsion (Fish Oil and Plant Based) 20% Infusion 1.182 Gm/kG/Day (20.8 mL/Hr) IV Continuous <Continuous>  lactated ringers Bolus 500 milliLiter(s) IV Bolus once  latanoprost 0.005% Ophthalmic Solution 1 Drop(s) Both EYES at bedtime  metoprolol succinate ER 25 milliGRAM(s) Oral daily  Parenteral Nutrition - Adult 1 Each (50 mL/Hr) TPN Continuous <Continuous>  Parenteral Nutrition - Adult 1 Each (50 mL/Hr) TPN Continuous <Continuous>  potassium phosphate IVPB 30 milliMole(s) IV Intermittent once    MEDICATIONS  (PRN):  OLANZapine Injectable 2.5 milliGRAM(s) IntraMuscular every 6 hours PRN agitation  sodium chloride 0.9% lock flush 10 milliLiter(s) IV Push every 1 hour PRN Pre/post blood products, medications, blood draw, and to maintain line patency    .  VITAL SIGNS:  T(C): 36.8 (01-26-22 @ 09:09), Max: 36.8 (01-26-22 @ 09:09)  T(F): 98.3 (01-26-22 @ 09:09), Max: 98.3 (01-26-22 @ 09:09)  HR: 120 (01-26-22 @ 12:22) (98 - 129)  BP: 125/60 (01-26-22 @ 12:22) (115/56 - 156/79)  BP(mean): --  RR: 20 (01-26-22 @ 12:22) (16 - 22)  SpO2: 97% (01-26-22 @ 12:22) (96% - 98%)  Wt(kg): --    PHYSICAL EXAM:    Constitutional: Frail, in NAD  Head: NC/AT  ENT: Dry MM  Neck: supple; no JVD   Respiratory: CTA B/L; no W/R/R,   Cardiac: +S1/S2; RRR; Tachycardic  no M/R/G;  Gastrointestinal: soft, midlly tender  no rebound or guarding; +Bs  Extremities: WWP, no clubbing or cyanosis; no peripheral edema  Vascular: 2+ radial,  DP/PT pulses B/L  Neurologic: AAOx3; CNII-XII grossly intact; no focal deficits        .  LABS:                         9.1    8.21  )-----------( 242      ( 26 Jan 2022 05:33 )             30.2     01-26    140  |  102  |  14  ----------------------------<  139<H>  3.0<L>   |  30  |  0.52    Ca    8.0<L>      26 Jan 2022 05:33  Phos  2.6     01-26  Mg     2.0     01-26      RADIOLOGY, EKG & ADDITIONAL TESTS: Reviewed.     < from: TTE Echo Complete w/o Contrast w/ Doppler (01.21.22 @ 12:01) >  ONCLUSIONS:     1. Patient was tachycardicduring the study.   2. The left ventricle is normal in size and low normal systolic function   with a calculated ejection fraction of 55%.   3. Normal right ventricular size and systolic function.   4. Aortic sclerosis without significant stenosis.  5. No evidence of pulmonary hypertension, pulmonary artery systolic   pressure is 16 mmHg.   6. Trivial pericardial effusion.    < end of copied text >

## 2022-01-26 NOTE — PROGRESS NOTE ADULT - ASSESSMENT
86yo female with afib (on Xarelto) dementia presents with weakness and poor PO intake. Exam significant for distention, CT with evidence of large bowel obstruction, narrowing of the rectum 9-12cm from the anal verge, differential includes malignancy vs infectious/inflammatory.     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 NPO past Wednesday midnight   - send covid PCR     Case discussed with adv attending and primary team.     Recommendations discussed with primary team    Grant Kenney MD  PGY-5 GI fellow  Pager: [phone]

## 2022-01-26 NOTE — PROCEDURE NOTE - NSPROCDETAILS_GEN_ALL_CORE
sterile technique, indwelling urinary device inserted
location identified, draped/prepped, sterile technique used/sterile dressing applied/sterile technique, catheter placed/supine position/ultrasound guidance

## 2022-01-26 NOTE — CONSULT NOTE ADULT - CONSULT REQUESTED DATE/TIME
21-Jan-2022 11:06
21-Jan-2022
21-Jan-2022 09:04
26-Jan-2022 11:59
21-Jan-2022 15:48
25-Jan-2022
24-Jan-2022 09:27

## 2022-01-26 NOTE — PROGRESS NOTE ADULT - ATTENDING COMMENTS
Pt seen and d/w fellow this afternoon.  Will plan on a full colonoscopy for tomorrow prior to resection of rectosigmoid cancer.

## 2022-01-26 NOTE — PROGRESS NOTE ADULT - PROBLEM SELECTOR PLAN 1
.  -recommend Tylenol Suspension 650mg PO q8h ATC x1-2 days  -recommend Zofran 4mg IV q6h PRN for Nausea/Vomiting    Since she does not advocate well for herself, scheduled medications may be helpful .  -c/w Zyprexa 2.5mg IM q6h PRN for Agitation  -recommend Zyprexa 5mg PO qhs while hospitalized

## 2022-01-26 NOTE — PROGRESS NOTE ADULT - ATTENDING COMMENTS
Initial attending contact date 1/21/22 . See fellow note written above for details. I reviewed the fellow documentation. I have personally seen and examined this patient. I reviewed vitals, labs, medications, cardiac studies, and additional imaging. I agree with the above fellow's findings and plans as written above with the following additions/statements.    88 yo female with Pmhx of Afib (on Xarelto) with SN dysfunction s/p PPM, dementia presents with failure to thrive with weakness and poor PO intake found to have large bowel obstruction found to be in Afib with RVR. Cardiology consulted for pre-op cardiac risk assessment  -now s/p endoscopic stent  -On tele: maintaining NSR, sinus tach @100-110  -Cont amio 200 mg qd, hypertensive this am. Start toprol 25 mg qd   -Prelim ECHO with hyperdynamic LVEF, LVH.  -AC when cleared by primary team. CHADSVASC2  = 3, no need for bridging with IV heparin while holding AC  - K > 4, Mag >   -Pls reconsult as needed

## 2022-01-26 NOTE — PROCEDURE NOTE - NSASSISTBY_GEN_A_CORE
RN Chaperone, PCA chaperone/Myself/Other [place] [employer] Chaperone, PCA chaperone/Myself/Other negative

## 2022-01-26 NOTE — PROGRESS NOTE ADULT - PROBLEM SELECTOR PLAN 6
.  Complex medical decision making / symptom management in the setting of advanced illness.    Will continue to follow for ongoing GOC discussion / titration of palliative regimen.   Emotional support provided, questions answered.  Active Psychosocial Referrals:  [x]Social Work/Case management [x]PT/OT []Chaplaincy []Hospice  [x]Patient/Family Support []Holistic RN []Massage Therapy []Ethics  Coping: [x] well [] with difficulty [] poor coping [] unable to assess  Support system: [] strong [x] adequate [] inadequate    For new or uncontrolled symptoms, please call Palliative Care at [phone]. The service is available 24/7 (including nights & weekends) to provide symptom management recommendations over the phone as appropriate

## 2022-01-26 NOTE — PROGRESS NOTE ADULT - SUBJECTIVE AND OBJECTIVE BOX
FULL NOTE TO FOLLOW    Given patient's recurrent issue with agitation overnight, recommend standing Zyprexa 5mg PO qhs while she remains inpatient (can discontinue prior to discharge) FULL NOTE TO FOLLOW     [facility] Pall[facility]  Keith Dunbar, Palliative Care Attending  Contact Info: Call [phone] ([facility] Line) or message on [employer] Teams (Keith Dunbar)    SUBJECTIVE AND OBJECTIVE:  INTERVAL HPI/OVERNIGHT EVENTS: Interval events noted. Patient with recurrent episodes of agitation. Patient required PRNs of Zyprexa x1 in past 24hrs. Currently denies any complaints of abdominal pain, nausea, or cramps. Just wants to sleep. Reached out to patient's daughter for support.    ALLERGIES:  No Known Allergies    MEDICATIONS  (STANDING):  aMIOdarone    Tablet 200 milliGRAM(s) Oral daily  chlorhexidine 2% Cloths 1 Application(s) Topical <User Schedule>  fat emulsion (Fish Oil and Plant Based) 20% Infusion 1.182 Gm/kG/Day (20.8 mL/Hr) IV Continuous <Continuous>  latanoprost 0.005% Ophthalmic Solution 1 Drop(s) Both EYES at bedtime  metoprolol succinate ER 25 milliGRAM(s) Oral daily  OLANZapine 5 milliGRAM(s) Oral at bedtime  Parenteral Nutrition - Adult 1 Each (50 mL/Hr) TPN Continuous <Continuous>    MEDICATIONS  (PRN):  OLANZapine Injectable 2.5 milliGRAM(s) IntraMuscular every 6 hours PRN agitation  sodium chloride 0.9% lock flush 10 milliLiter(s) IV Push every 1 hour PRN Pre/post blood products, medications, blood draw, and to maintain line patency    ITEMS UNCHECKED ARE NOT PRESENT  PRESENT SYMPTOMS/REVIEW OF SYSTEMS: []Unable to obtain due to poor mentation   Source if other than patient:  []Family   []Team     Pain: []yes [x]no  QOL impact -   Location -       Aggravating factors -  Quality -   Radiation -  Timing -   Severity (0-10 scale):  Minimal acceptable level (0-10 scale):     Dyspnea:                           [ ]Mild [ ]Moderate [ ]Severe  Anxiety:                             [ ]Mild [ ]Moderate [ ]Severe  Fatigue:                             [x]Mild [ ]Moderate [ ]Severe  Nausea:                             []Mild [ ]Moderate [ ]Severe  Loss of appetite:              [ ]Mild [ ]Moderate [ ]Severe  Constipation:                    [ ]Mild [ ]Moderate [ ]Severe    Other Symptoms:  [x]All other review of systems negative     Palliative Performance Status Version 2:         40%       GENERAL:  [x]Alert  [x]Oriented x2   [ ]Lethargic  [ ]Cachexia  [ ]Unarousable  [x]Verbal  [ ]Non-Verbal  Behavioral:   [ ] Anxiety  [ ] Delirium [ ] Agitation [x] Cooperative  HEENT:  [x]Normal   [ ]Dry mouth   [ ]ET Tube/Trach  [ ]Oral lesions  PULMONARY:   [x]Clear [ ]Tachypnea  [ ]Audible excessive secretions   [ ]Rhonchi        [ ]Right [ ]Left [ ]Bilateral  [ ]Crackles        [ ]Right [ ]Left [ ]Bilateral  [ ]Wheezing     [ ]Right [ ]Left [ ]Bilateral  [ ]Diminished breath sounds [ ]right [ ]left [ ]bilateral  CARDIOVASCULAR:    [x]Regular [ ]Irregular [ ]Tachy  [ ]Kai [ ]Murmur [ ]Other  GASTROINTESTINAL:  [x]Soft  [x]Distended   [ ]+BS  [x]Non tender [ ]Tender  [ ]PEG [ ]OGT/ NGT  Last BM:   GENITOURINARY:  [x]Normal [ ] Incontinent   [ ]Oliguria/Anuria   [ ]Reyes  MUSCULOSKELETAL:   [ ]Normal   [x]Weakness  [ ]Bed/Wheelchair bound [ ]Edema  NEUROLOGIC:   [ ]No focal deficits  [x]Cognitive impairment  [ ]Dysphagia [ ]Dysarthria [ ]Paresis [ ]Other   SKIN:   [x]Normal    [ ]Rash  [ ]Pressure ulcer(s)     Vital Signs Last 24 Hrs  T(C): 36.8 (01-26-22 @ 09:09), Max: 36.8 (01-26-22 @ 09:09)  T(F): 98.3 (01-26-22 @ 09:09), Max: 98.3 (01-26-22 @ 09:09)  HR: 120 (01-26-22 @ 12:22) (98 - 129)  BP: 125/60 (01-26-22 @ 12:22) (115/56 - 156/79)  BP(mean): --  RR: 20 (01-26-22 @ 12:22) (16 - 22)  SpO2: 97% (01-26-22 @ 12:22) (96% - 98%)    LABS:                         9.1    8.21  )-----------( 242      ( 26 Jan 2022 05:33 )             30.2     01-26    140  |  102  |  14  ----------------------------<  139<H>  3.0<L>   |  30  |  0.52    Ca    8.0<L>      26 Jan 2022 05:33  Phos  2.6     01-26  Mg     2.0     01-26    RADIOLOGY & ADDITIONAL STUDIES: None new    DISCUSSION OF CASE: Daughter (Blanca) - to provide updates and emotional support; Surgery - to discuss agitation recs

## 2022-01-26 NOTE — PROCEDURE NOTE - NSINFORMCONSENT_GEN_A_CORE
Implied consent based on patient condition/This was an emergent procedure.
Benefits, risks, and possible complications of procedure explained to patient/caregiver who verbalized understanding and gave verbal consent.

## 2022-01-27 LAB
ANION GAP SERPL CALC-SCNC: 5 MMOL/L — SIGNIFICANT CHANGE UP (ref 5–17)
APPEARANCE UR: CLEAR — SIGNIFICANT CHANGE UP
APTT BLD: 30.5 SEC — SIGNIFICANT CHANGE UP (ref 27.5–35.5)
BILIRUB UR-MCNC: NEGATIVE — SIGNIFICANT CHANGE UP
BUN SERPL-MCNC: 14 MG/DL — SIGNIFICANT CHANGE UP (ref 7–23)
CALCIUM SERPL-MCNC: 8 MG/DL — LOW (ref 8.4–10.5)
CHLORIDE SERPL-SCNC: 106 MMOL/L — SIGNIFICANT CHANGE UP (ref 96–108)
CO2 SERPL-SCNC: 31 MMOL/L — SIGNIFICANT CHANGE UP (ref 22–31)
COLOR SPEC: YELLOW — SIGNIFICANT CHANGE UP
CREAT SERPL-MCNC: 0.5 MG/DL — SIGNIFICANT CHANGE UP (ref 0.5–1.3)
DIFF PNL FLD: NEGATIVE — SIGNIFICANT CHANGE UP
GLUCOSE BLDC GLUCOMTR-MCNC: 149 MG/DL — HIGH (ref 70–99)
GLUCOSE SERPL-MCNC: 141 MG/DL — HIGH (ref 70–99)
GLUCOSE UR QL: 100
HCT VFR BLD CALC: 27.2 % — LOW (ref 34.5–45)
HGB BLD-MCNC: 8.2 G/DL — LOW (ref 11.5–15.5)
KETONES UR-MCNC: NEGATIVE — SIGNIFICANT CHANGE UP
LEUKOCYTE ESTERASE UR-ACNC: NEGATIVE — SIGNIFICANT CHANGE UP
MAGNESIUM SERPL-MCNC: 2.1 MG/DL — SIGNIFICANT CHANGE UP (ref 1.6–2.6)
MCHC RBC-ENTMCNC: 27.5 PG — SIGNIFICANT CHANGE UP (ref 27–34)
MCHC RBC-ENTMCNC: 30.1 GM/DL — LOW (ref 32–36)
MCV RBC AUTO: 91.3 FL — SIGNIFICANT CHANGE UP (ref 80–100)
NITRITE UR-MCNC: NEGATIVE — SIGNIFICANT CHANGE UP
NRBC # BLD: 0 /100 WBCS — SIGNIFICANT CHANGE UP (ref 0–0)
PH UR: 8 — SIGNIFICANT CHANGE UP (ref 5–8)
PHOSPHATE SERPL-MCNC: 3.4 MG/DL — SIGNIFICANT CHANGE UP (ref 2.5–4.5)
PLATELET # BLD AUTO: 212 K/UL — SIGNIFICANT CHANGE UP (ref 150–400)
POTASSIUM SERPL-MCNC: 4 MMOL/L — SIGNIFICANT CHANGE UP (ref 3.5–5.3)
POTASSIUM SERPL-SCNC: 4 MMOL/L — SIGNIFICANT CHANGE UP (ref 3.5–5.3)
PROT UR-MCNC: NEGATIVE MG/DL — SIGNIFICANT CHANGE UP
RBC # BLD: 2.98 M/UL — LOW (ref 3.8–5.2)
RBC # FLD: 14.7 % — HIGH (ref 10.3–14.5)
SODIUM SERPL-SCNC: 142 MMOL/L — SIGNIFICANT CHANGE UP (ref 135–145)
SP GR SPEC: 1.02 — SIGNIFICANT CHANGE UP (ref 1–1.03)
UROBILINOGEN FLD QL: 0.2 E.U./DL — SIGNIFICANT CHANGE UP
WBC # BLD: 6 K/UL — SIGNIFICANT CHANGE UP (ref 3.8–10.5)
WBC # FLD AUTO: 6 K/UL — SIGNIFICANT CHANGE UP (ref 3.8–10.5)

## 2022-01-27 PROCEDURE — 99233 SBSQ HOSP IP/OBS HIGH 50: CPT

## 2022-01-27 PROCEDURE — 93010 ELECTROCARDIOGRAM REPORT: CPT

## 2022-01-27 PROCEDURE — 99232 SBSQ HOSP IP/OBS MODERATE 35: CPT | Mod: GC

## 2022-01-27 RX ORDER — METOPROLOL TARTRATE 50 MG
5 TABLET ORAL ONCE
Refills: 0 | Status: COMPLETED | OUTPATIENT
Start: 2022-01-27 | End: 2022-01-27

## 2022-01-27 RX ORDER — ELECTROLYTE SOLUTION,INJ
1 VIAL (ML) INTRAVENOUS
Refills: 0 | Status: DISCONTINUED | OUTPATIENT
Start: 2022-01-27 | End: 2022-01-27

## 2022-01-27 RX ORDER — RIVAROXABAN 15 MG-20MG
20 KIT ORAL
Refills: 0 | Status: DISCONTINUED | OUTPATIENT
Start: 2022-01-27 | End: 2022-02-02

## 2022-01-27 RX ORDER — I.V. FAT EMULSION 20 G/100ML
1.18 EMULSION INTRAVENOUS
Qty: 50 | Refills: 0 | Status: DISCONTINUED | OUTPATIENT
Start: 2022-01-27 | End: 2022-01-27

## 2022-01-27 RX ADMIN — AMIODARONE HYDROCHLORIDE 200 MILLIGRAM(S): 400 TABLET ORAL at 06:42

## 2022-01-27 RX ADMIN — I.V. FAT EMULSION 20.8 GM/KG/DAY: 20 EMULSION INTRAVENOUS at 23:17

## 2022-01-27 RX ADMIN — Medication 5 MILLIGRAM(S): at 06:43

## 2022-01-27 RX ADMIN — RIVAROXABAN 20 MILLIGRAM(S): KIT at 17:52

## 2022-01-27 RX ADMIN — Medication 1 EACH: at 18:57

## 2022-01-27 RX ADMIN — LATANOPROST 1 DROP(S): 0.05 SOLUTION/ DROPS OPHTHALMIC; TOPICAL at 23:14

## 2022-01-27 RX ADMIN — CHLORHEXIDINE GLUCONATE 1 APPLICATION(S): 213 SOLUTION TOPICAL at 06:42

## 2022-01-27 NOTE — PROGRESS NOTE ADULT - ASSESSMENT
86 yo female PMH of dementia, Afib (Xarelto, Last taken Tuesday) PPM (Indicated for tachy-yudith syndrome), cerebral LICA paraclinoid aneurysm (s/p stenting) presented with LBO w/ obstructing rectal mass now s/p rectal stent (1/21). Course c/b a-fib with RVR transferred to SICU s/p amio drip now stable and stepped down to tele    CLD, PICC/TPN, NPO@[place]  Home meds as appropriate  Ceftriaxone(1/21-1/26)  Pain/nausea PRN  Amio PO for AFib w/ RVR  ISS  AM labs  Appreciate palliative recs  GI Scope 1/27

## 2022-01-27 NOTE — PROGRESS NOTE ADULT - ATTENDING COMMENTS
Pt seen and d/w fellow this afternoon.  Pt did not take bowel prep, colonoscopy cancelled. If pt can be convinced to drink prep we will proceed with colonoscopy tomorrow/ early next week.

## 2022-01-27 NOTE — PROGRESS NOTE ADULT - ASSESSMENT
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     # Rectosigmoid cancer   s/p Flex Sig (1/21/22); obstructive mass seen in the rectosigmoid at ~10 cm s/p placement of uncovered 24 x 120 mm stent  having bowel movements as of 1/27  Possible colonoscopy this admission depending on ability to tolerate prep     # Chronic atrial fibrillation   on Xarelto + metoprolol as outpatient  - Started on amiodarone earlier in admission: now on amiodarone 200mg qd + metoprolol succinate 25mg qd   - defer recs re: outpatient AC for atrial fibrillation to GI and Cards ; per cardiology, no need for bridging with IV heparin while holding AC     # Metabolic encephalopathy   Attempted to elope evening of 1/25. Confused.   Change in mental status from baseline likely 2/2 stress of hospitalization, rectosigmoid cancer, and afib superimposed on poor substrate (history of dementia)   - frequent reorientation     # BMI <19  BMI: BMI (kg/m2): 16.5 (01-22-22 @ 05:00)  Affects all aspects of care.  Dose medications by weight   On TPN     # Hypokalemia   Potassium, Serum: 3.2 mmol/L (01-25-22 @ 20:17)  Potassium, Serum: 2.5 mmol/L (01-25-22 @ 15:54)   ; Likely 2/2 poor PO intake; Replete  Keep Mg >2    # Hypophosphatemia - Phosphorus Level, Serum:   Phosphorus Level, Serum: 2.4 mg/dL (01-25-22 @ 12:43)   ; Likely 2/2 poor PO intake; Replete    # vertebral compression fracture   CT abd from this admission showing ".  Severe L2 vertebral body compression deformity and retropulsed   posterior fragment moderately narrowing the central canal at this level."  [ ] Recommend eval by neurosurgery ; may benefit from back brace when patient goes to rehab. defer recs re: further imaging and management to NSGY consult.        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     # Rectosigmoid cancer   s/p Flex Sig (1/21/22); obstructive mass seen in the rectosigmoid at ~10 cm s/p placement of uncovered 24 x 120 mm stent  having bowel movements as of 1/27  Possible colonoscopy this admission depending on ability to tolerate prep     # Chronic atrial fibrillation   on Xarelto + metoprolol as outpatient  - Started on amiodarone earlier in admission: now on amiodarone 200mg qd + metoprolol succinate 25mg qd   - defer recs re: outpatient AC for atrial fibrillation to GI and Cards ; per cardiology, no need for bridging with IV heparin while holding AC     # Metabolic encephalopathy   Attempted to elope evening of 1/25. Confused.   Change in mental status from baseline likely 2/2 stress of hospitalization, rectosigmoid cancer, afib superimposed on poor substrate (history of dementia)   - frequent reorientation     # Urinary retention  Reyes replaced on 1.27.22 - Drained >1L upon placement   TOV per primary team;   encourage patient to attempt urination on commode--some retention may be positional     # BMI <19  BMI: BMI (kg/m2): 16.5 (01-22-22 @ 05:00)  Affects all aspects of care.  Dose medications by weight   On TPN     # Hypokalemia - repleted  Potassium, Serum: 4.0 mmol/L (01-27-22 @ 06:08)  # Hypophosphatemia - repleted  Phosphorus Level, Serum: 3.4 mg/dL (01-27-22 @ 06:08)    # vertebral compression fracture   CT abd from this admission showing ".  Severe L2 vertebral body compression deformity and retropulsed   posterior fragment moderately narrowing the central canal at this level."  [ ] back brace per NSGY consult   defer recs re: further imaging and management to NSGY consult.

## 2022-01-27 NOTE — PROGRESS NOTE ADULT - SUBJECTIVE AND OBJECTIVE BOX
GASTROENTEROLOGY PROGRESS NOTE  Patient seen and examined at bedside. Pt refused to take the bowel prep per the primary team. No new complaints    PERTINENT REVIEW OF SYSTEMS:  As noted above    Allergies    No Known Allergies    Intolerances      MEDICATIONS:  MEDICATIONS  (STANDING):  aMIOdarone    Tablet 200 milliGRAM(s) Oral daily  chlorhexidine 2% Cloths 1 Application(s) Topical <User Schedule>  fat emulsion (Fish Oil and Plant Based) 20% Infusion 1.182 Gm/kG/Day (20.8 mL/Hr) IV Continuous <Continuous>  fat emulsion (Fish Oil and Plant Based) 20% Infusion 1.182 Gm/kG/Day (20.8 mL/Hr) IV Continuous <Continuous>  latanoprost 0.005% Ophthalmic Solution 1 Drop(s) Both EYES at bedtime  metoprolol succinate ER 25 milliGRAM(s) Oral daily  OLANZapine 5 milliGRAM(s) Oral at bedtime  Parenteral Nutrition - Adult 1 Each (50 mL/Hr) TPN Continuous <Continuous>  Parenteral Nutrition - Adult 1 Each (50 mL/Hr) TPN Continuous <Continuous>  rivaroxaban 20 milliGRAM(s) Oral with dinner    MEDICATIONS  (PRN):  sodium chloride 0.9% lock flush 10 milliLiter(s) IV Push every 1 hour PRN Pre/post blood products, medications, blood draw, and to maintain line patency    Vital Signs Last 24 Hrs  T(C): 37 (2022 09:02), Max: 37.5 (2022 04:26)  T(F): 98.6 (2022 09:02), Max: 99.5 (2022 04:26)  HR: 96 (2022 09:02) (96 - 120)  BP: 142/63 (2022 09:02) (125/60 - 159/84)  BP(mean): 90 (2022 08:48) (90 - 90)  RR: 18 (2022 09:02) (18 - 27)  SpO2: 97% (2022 09:02) (96% - 100%)     @ 07:01  -   @ 07:00  --------------------------------------------------------  IN: 808 mL / OUT: 1500 mL / NET: -692 mL     @ 07:01  -   @ 12:18  --------------------------------------------------------  IN: 0 mL / OUT: 1200 mL / NET: -1200 mL      PHYSICAL EXAM:    General: Well developed; in no acute distress  HEENT: MMM, conjunctiva and sclera clear  Gastrointestinal: Soft non-tender non-distended; No rebound or guarding  Skin: Warm and dry. No obvious rash    LABS:                        8.2    6.00  )-----------( 212      ( 2022 06:08 )             27.2         142  |  106  |  14  ----------------------------<  141<H>  4.0   |  31  |  0.50    Ca    8.0<L>      2022 06:08  Phos  3.4       Mg     2.1           PTT - ( 2022 06:08 )  PTT:30.5 sec      Urinalysis Basic - ( 2022 11:47 )    Color: Yellow / Appearance: Clear / S.020 / pH: x  Gluc: x / Ketone: NEGATIVE  / Bili: Negative / Urobili: 0.2 E.U./dL   Blood: x / Protein: NEGATIVE mg/dL / Nitrite: NEGATIVE   Leuk Esterase: NEGATIVE / RBC: x / WBC x   Sq Epi: x / Non Sq Epi: x / Bacteria: x                RADIOLOGY & ADDITIONAL STUDIES:  Reviewed

## 2022-01-27 NOTE — CHART NOTE - NSCHARTNOTEFT_GEN_A_CORE
Admitting Diagnosis:   Patient is a 87y old  Female who presents with a chief complaint of Weakness (27 Jan 2022 08:50)    PAST MEDICAL & SURGICAL HISTORY:  Glaucoma  Osteoarthritis  Syncope and collapse  PVC (premature ventricular contraction)  Cerebral aneurysm  History of loop recorder  History of cholecystectomy      Current Nutrition Order:  Diet, NPO after Midnight:      NPO Start Date: 26-Jan-2022,   NPO Start Time: 23:59 (01-26-22 @ 06:00)  Diet, Clear Liquid:   Supplement Feeding Modality:  Oral  Ensure Clear Cans or Servings Per Day:  1       Frequency:  Daily (01-25-22 @ 17:39)    PO Intake: Good (%) [   ]  Fair (50-75%) [   ] Poor (<25%) [ x  ]  -Pt drinking a few sips of clear liquids.     Parenteral nutrition: Initiated 1/24  Route- PICC  Regimen- 78g AA, 250g dextrose, 50g SMOF lipids @ 50ml/hr  Provides- 1662 kcals, 78g protein, 1200ml total volume/24hrs. Meets 110% energy and 111% protein needs.   Labs- Glucose 141 H (glucose fluctuating mid-high 100s and low 200s), triglycerides 93 (1/24/22)     GI Issues: +Multiple bowels d/t golytely for colonoscopy today (1/27)   Pain:   Skin Integrity:     Labs:   01-27    142  |  106  |  14  ----------------------------<  141<H>  4.0   |  31  |  0.50    Ca    8.0<L>      27 Jan 2022 06:08  Phos  3.4     01-27  Mg     2.1     01-27      CAPILLARY BLOOD GLUCOSE          Medications:  MEDICATIONS  (STANDING):  aMIOdarone    Tablet 200 milliGRAM(s) Oral daily  chlorhexidine 2% Cloths 1 Application(s) Topical <User Schedule>  fat emulsion (Fish Oil and Plant Based) 20% Infusion 1.182 Gm/kG/Day (20.8 mL/Hr) IV Continuous <Continuous>  latanoprost 0.005% Ophthalmic Solution 1 Drop(s) Both EYES at bedtime  metoprolol succinate ER 25 milliGRAM(s) Oral daily  OLANZapine 5 milliGRAM(s) Oral at bedtime  Parenteral Nutrition - Adult 1 Each (50 mL/Hr) TPN Continuous <Continuous>    MEDICATIONS  (PRN):  sodium chloride 0.9% lock flush 10 milliLiter(s) IV Push every 1 hour PRN Pre/post blood products, medications, blood draw, and to maintain line patency      Anthropometrics:  Daily     Daily     IBW:     % IBW    Weight Change:     Nutrition Focused Physical Exam: Completed [   ]  Not Pertinent [   ]  Muscle Wasting- Temporal [   ]  Clavicle/Pectoral [   ]  Shoulder/Deltoid [   ]  Scapula [   ]  Interosseous [   ]  Quadriceps [   ]  Gastrocnemius [   ]  Fat Wasting- Orbital [   ]  Buccal [   ]  Triceps [   ]  Rib [   ]  Suspect [PCM] 2/2 to physical assessment, [poor intake], and [wt loss]; please see malnutrition chart note.    Estimated energy needs:   Calories:   Protein:   Fluid:    Subjective:     Previous Nutrition Diagnosis:    Active [   ]  Resolved [   ]    If resolved, new PES:     Goal:    Recommendations:    Education:     Risk Level: High [   ] Moderate [   ] Low [   ] Admitting Diagnosis:   Patient is a 87y old  Female who presents with a chief complaint of Weakness (27 Jan 2022 08:50)    PAST MEDICAL & SURGICAL HISTORY:  Glaucoma  Osteoarthritis  Syncope and collapse  PVC (premature ventricular contraction)  Cerebral aneurysm  History of loop recorder  History of cholecystectomy      Current Nutrition Order:  Diet, NPO after Midnight:      NPO Start Date: 26-Jan-2022,   NPO Start Time: 23:59 (01-26-22 @ 06:00)  Diet, Clear Liquid:   Supplement Feeding Modality:  Oral  Ensure Clear Cans or Servings Per Day:  1       Frequency:  Daily (01-25-22 @ 17:39)    PO Intake: Good (%) [   ]  Fair (50-75%) [   ] Poor (<25%) [ x  ]  -Pt A&O x1, only taking a few sips of clear liquids. Noted SLP evaluated 1/23 and recommend CLD per MD. Spoke with Team 4 via phone today. States plan to advance to low fiber diet today, but pt altered and suspect po intake will be minimal. Discuss pt may benefit from SLP eval upon diet advancement to solids foods since last assessment pt only on CLD. Team 4 agreeable.     Parenteral nutrition: Initiated 1/24  Route- PICC  Regimen- 78g AA, 250g dextrose, 50g SMOF lipids @ 50ml/hr  Provides- 1662 kcals, 78g protein, 1200ml total volume/24hrs. Meets 110% energy and 111% protein needs.   Labs- Glucose 141 H (glucose fluctuating mid-high 100s and low 200s), triglycerides 93 (1/24/22)     GI Issues: +Multiple bowels d/t golytely for colonoscopy today (1/27)   Pain: Denies   Skin Integrity: bruising, incontinence. no pressure breakdown noted   Jules Score: 13  Edema- 1+ generalized edema, 2+ left arm     Labs: Glucose 141 H   01-27    142  |  106  |  14  ----------------------------<  141<H>  4.0   |  31  |  0.50    Ca    8.0<L>      27 Jan 2022 06:08  Phos  3.4     01-27  Mg     2.1     01-27    Medications:  MEDICATIONS  (STANDING):  aMIOdarone    Tablet 200 milliGRAM(s) Oral daily  chlorhexidine 2% Cloths 1 Application(s) Topical <User Schedule>  fat emulsion (Fish Oil and Plant Based) 20% Infusion 1.182 Gm/kG/Day (20.8 mL/Hr) IV Continuous <Continuous>  latanoprost 0.005% Ophthalmic Solution 1 Drop(s) Both EYES at bedtime  metoprolol succinate ER 25 milliGRAM(s) Oral daily  OLANZapine 5 milliGRAM(s) Oral at bedtime  Parenteral Nutrition - Adult 1 Each (50 mL/Hr) TPN Continuous <Continuous>    MEDICATIONS  (PRN):  sodium chloride 0.9% lock flush 10 milliLiter(s) IV Push every 1 hour PRN Pre/post blood products, medications, blood draw, and to maintain line patency    Anthropometrics:  Ht: 160cm (63")  Wt: 42.3kg (1/22/2022)-Admit     IBW: 115# (52.3kg)   % IBW: 81%     Weight Change: no new weight to assess since admission.     Nutrition Focused Physical Exam: Completed [ x  ]  Not Pertinent [   ]  -Severe malnutrition in the context of acute illness, diagnosed 1/22    Estimated energy needs: 42.3kg  ABW for estimated needs per clinical judgement, adjusted for malnutrition, acute illness, malignancy, age   Calories: 1475-7021 kcals (30-35kcals/kg)  Protein: 55-70g (1.3-1.6g/kg)  Fluid: 1250-1500ml (30-35ml/kg)    Subjective:   88yo female with afib (on Xarelto) dementia presents with weakness and poor PO intake. Exam significant for distention, CT with evidence of large bowel obstruction, narrowing of the rectum 9-12cm from the anal verge, differential includes malignancy vs infectious/inflammatory. S/P flex sig 1/21/22 revealed obstructive mass lesion seen in rectosigmoid at ~10cm now s/p stent placement. Planed for colonoscopy today (1/27) after bowel prep, however pt did not complete bowel prep with [facility]. Spoke with team 4 via phone, no plan to procedure with colonoscopy today and plan to start process for CALE placement. Discussed long-term nutrition plan for pt. Pt with functional GI tract and no obstruction since stent placement. PA/MD plans to advance diet to Low fiber today and assess po intake. Also plan to have another SLP eval to ensure safe po intake. Per PA/MD, will continue TPN while inpatient, but titrate off prior to d/c to CALE d/t pt with severe malnutrition. Agree pt is not appropriate for long-term TPN or EN d/t functional GI tract and hx of dementia currently A&O x1.     Previous Nutrition Diagnosis: Malnutrition R/T inadequate energy intake i/s/o declining mental and physical conditions AEB muscle/fat/ wt loss, poor po intake     Active [ x  ]  Resolved [   ]    If resolved, new PES:     Goal:  Pt will meet >75% of daily EER via tolerated route    Recommendations:  1. Diet advancement to Low fiber diet   -Complete SLP eval once diet advanced to solids to ensure safe po intake  2. Agree to continue TPN while inpatient, but not long-term d/t pt with severe malnutrition/ aid in improving nutritional status   -Decreased dextrose from 250g to 230g, 78g AA, 50g SMOF lipids   (Provides 1594kcals, 78g protein, GIR 3.8)  3. Monitor labs  4. Bowel meds per MD    Education: Pt with dementia, no appropriate for education     Risk Level: High [ x  ] Moderate [   ] Low [   ]  Macrina Gilbert RD,CDN,,CNSC

## 2022-01-27 NOTE — PROGRESS NOTE ADULT - SUBJECTIVE AND OBJECTIVE BOX
Patient is a 87y old  Female who presents with a chief complaint of Weakness (2022 12:18)    INTERVAL EVENTS:  - Started on metoprolol succinate 25mg qd yesterday   - Reyes placed overnight (drained >1L of urine upon placement)     SUBJECTIVE:  Patient was seen and examined at bedside.  Review of systems: Patient denies: HA, CP, dyspnea, nausea or vomiting, LE edema. Rest of 12 point Review of systems negative unless otherwise documented elsewhere in note.     Diet, Low Fiber (22 @ 11:00) [Active]  Diet, NPO after Midnight:      NPO Start Date: 2022,   NPO Start Time: 23:59 (22 @ 06:00) [Active]    MEDICATIONS:  MEDICATIONS  (STANDING):  aMIOdarone    Tablet 200 milliGRAM(s) Oral daily  chlorhexidine 2% Cloths 1 Application(s) Topical <User Schedule>  fat emulsion (Fish Oil and Plant Based) 20% Infusion 1.182 Gm/kG/Day (20.8 mL/Hr) IV Continuous <Continuous>  fat emulsion (Fish Oil and Plant Based) 20% Infusion 1.182 Gm/kG/Day (20.8 mL/Hr) IV Continuous <Continuous>  latanoprost 0.005% Ophthalmic Solution 1 Drop(s) Both EYES at bedtime  metoprolol succinate ER 25 milliGRAM(s) Oral daily  OLANZapine 5 milliGRAM(s) Oral at bedtime  Parenteral Nutrition - Adult 1 Each (50 mL/Hr) TPN Continuous <Continuous>  Parenteral Nutrition - Adult 1 Each (50 mL/Hr) TPN Continuous <Continuous>  rivaroxaban 20 milliGRAM(s) Oral with dinner    MEDICATIONS  (PRN):  sodium chloride 0.9% lock flush 10 milliLiter(s) IV Push every 1 hour PRN Pre/post blood products, medications, blood draw, and to maintain line patency    Allergies    No Known Allergies    Intolerances    OBJECTIVE:  Vital Signs Last 24 Hrs  T(C): 37.1 (2022 13:38), Max: 37.5 (2022 04:26)  T(F): 98.8 (2022 13:38), Max: 99.5 (2022 04:26)  HR: 105 (2022 13:38) (96 - 116)  BP: 133/61 (2022 13:38) (126/69 - 159/84)  BP(mean): 96 (2022 12:19) (90 - 96)  RR: 22 (2022 13:38) (18 - 27)  SpO2: 96% (2022 13:38) (95% - 100%)  I&O's Summary    2022 07:01  -  2022 07:00  --------------------------------------------------------  IN: 808 mL / OUT: 1500 mL / NET: -692 mL    2022 07:01  -  2022 14:21  --------------------------------------------------------  IN: 0 mL / OUT: 1200 mL / NET: -1200 mL    PHYSICAL EXAM:  Gen: Reclining in bed at time of exam, appears stated age  HEENT: NCAT, MMM, clear OP  Neck: supple, trachea at midline  CV: borderline tachycardia ~100bpm; +S1/S2; sinus tachycardia on telemetry   Pulm: adequate respiratory effort, no increase in work of breathing  Abd: soft, NTND  Skin: warm and dry, no new rashes vs prior report  Ext: WWP, no LE edema  Neuro: Alert, oriented to year , hospital "not [facility], the other one", no gross focal neurological deficits  Psych: affect and behavior appropriate, pleasant at time of interview    LABS:                        8.2    6.00  )-----------( 212      ( 2022 06:08 )             27.2         142  |  106  |  14  ----------------------------<  141<H>  4.0   |  31  |  0.50    Ca    8.0<L>      2022 06:08  Phos  3.4     01-27  Mg     2.1         PTT - ( 2022 06:08 )  PTT:30.5 sec  CAPILLARY BLOOD GLUCOSE    Urinalysis Basic - ( 2022 11:47 )    Color: Yellow / Appearance: Clear / S.020 / pH: x  Gluc: x / Ketone: NEGATIVE  / Bili: Negative / Urobili: 0.2 E.U./dL   Blood: x / Protein: NEGATIVE mg/dL / Nitrite: NEGATIVE   Leuk Esterase: NEGATIVE / RBC: x / WBC x   Sq Epi: x / Non Sq Epi: x / Bacteria: x    MICRODATA:      RADIOLOGY/OTHER STUDIES:

## 2022-01-27 NOTE — PROGRESS NOTE ADULT - ATTENDING COMMENTS
Urinary retention, had johnson placed.  Took some of prep, reportedly clear BMs.  Abd benign    Hypokalemia corrected.    A/P: large bowel obstruction due to rectosigmoid cancer. A-fib with RVR, improved. RLL pneumonia s/p course of abx. Urinary retention.   1. completion colonoscopy today  2. TPN. Low residue diet after colonoscopy.  3. Start Xarelto after colonoscopy if ok with GI.  4. On metoprolol and amiodarone for A fib .  5. As d/w daughter, will aim for rehab.

## 2022-01-27 NOTE — PROGRESS NOTE ADULT - SUBJECTIVE AND OBJECTIVE BOX
SUBJECTIVE: Pt seen and examined by chief resident. Pt is doing well, resting comfortably on bed. Feeling tired.     Vital Signs Last 24 Hrs  T(C): 37.1 (27 Jan 2022 06:14), Max: 37.5 (27 Jan 2022 04:26)  T(F): 98.7 (27 Jan 2022 06:14), Max: 99.5 (27 Jan 2022 04:26)  HR: 116 (27 Jan 2022 06:14) (106 - 129)  BP: 146/67 (27 Jan 2022 06:14) (115/56 - 159/84)  RR: 22 (27 Jan 2022 06:14) (18 - 27)  SpO2: 96% (27 Jan 2022 06:14) (96% - 100%)    Physical Exam:  General: NAD  Pulmonary: Nonlabored breathing, no respiratory distress  Abdominal: soft, NT/ND no rebound or guarding.   Extremities: WWP, SCDs in place    I&O's Summary    26 Jan 2022 07:01  -  27 Jan 2022 07:00  --------------------------------------------------------  IN: 808 mL / OUT: 1500 mL / NET: -692 mL        LABS:                        8.2    6.00  )-----------( 212      ( 27 Jan 2022 06:08 )             27.2     01-27    142  |  106  |  14  ----------------------------<  141<H>  4.0   |  31  |  0.50    Ca    8.0<L>      27 Jan 2022 06:08  Phos  3.4     01-27  Mg     2.1     01-27      PTT - ( 27 Jan 2022 06:08 )  PTT:30.5 sec

## 2022-01-27 NOTE — PROGRESS NOTE ADULT - ASSESSMENT
86yo female with afib (on Xarelto) dementia presents with weakness and poor PO intake. Exam significant for distention, CT with evidence of large bowel obstruction, narrowing of the rectum 9-12cm from the anal verge, differential includes malignancy vs infectious/inflammatory.     # Rectal stricture  s/p Flex Sig (1/21/22); An obstructive mass lesion seen in the rectosigmoid at ~10 cm s/p placement of uncovered 24 x 120 mm SEMS  - abdomen decompressed after stent placement  - Was planned for colonoscopy today however pt was unable to be prepped for the same therefore was cancelled  - Further plan per primary team     Case discussed with adv attending and primary team.     Recommendations discussed with primary team    Grant Kennye MD  PGY-5 GI fellow  Pager: [phone]

## 2022-01-28 DIAGNOSIS — K92.2 GASTROINTESTINAL HEMORRHAGE, UNSPECIFIED: ICD-10-CM

## 2022-01-28 DIAGNOSIS — I48.91 UNSPECIFIED ATRIAL FIBRILLATION: ICD-10-CM

## 2022-01-28 DIAGNOSIS — C20 MALIGNANT NEOPLASM OF RECTUM: ICD-10-CM

## 2022-01-28 DIAGNOSIS — G93.49 OTHER ENCEPHALOPATHY: ICD-10-CM

## 2022-01-28 DIAGNOSIS — R53.2 FUNCTIONAL QUADRIPLEGIA: ICD-10-CM

## 2022-01-28 LAB
ANION GAP SERPL CALC-SCNC: 6 MMOL/L — SIGNIFICANT CHANGE UP (ref 5–17)
BUN SERPL-MCNC: 17 MG/DL — SIGNIFICANT CHANGE UP (ref 7–23)
CALCIUM SERPL-MCNC: 8.4 MG/DL — SIGNIFICANT CHANGE UP (ref 8.4–10.5)
CHLORIDE SERPL-SCNC: 109 MMOL/L — HIGH (ref 96–108)
CO2 SERPL-SCNC: 27 MMOL/L — SIGNIFICANT CHANGE UP (ref 22–31)
CREAT SERPL-MCNC: 0.54 MG/DL — SIGNIFICANT CHANGE UP (ref 0.5–1.3)
GLUCOSE SERPL-MCNC: 135 MG/DL — HIGH (ref 70–99)
HCT VFR BLD CALC: 25.9 % — LOW (ref 34.5–45)
HGB BLD-MCNC: 7.8 G/DL — LOW (ref 11.5–15.5)
MAGNESIUM SERPL-MCNC: 2.3 MG/DL — SIGNIFICANT CHANGE UP (ref 1.6–2.6)
MCHC RBC-ENTMCNC: 27.5 PG — SIGNIFICANT CHANGE UP (ref 27–34)
MCHC RBC-ENTMCNC: 30.1 GM/DL — LOW (ref 32–36)
MCV RBC AUTO: 91.2 FL — SIGNIFICANT CHANGE UP (ref 80–100)
NRBC # BLD: 0 /100 WBCS — SIGNIFICANT CHANGE UP (ref 0–0)
PHOSPHATE SERPL-MCNC: 3 MG/DL — SIGNIFICANT CHANGE UP (ref 2.5–4.5)
PLATELET # BLD AUTO: 195 K/UL — SIGNIFICANT CHANGE UP (ref 150–400)
POTASSIUM SERPL-MCNC: 4.8 MMOL/L — SIGNIFICANT CHANGE UP (ref 3.5–5.3)
POTASSIUM SERPL-SCNC: 4.8 MMOL/L — SIGNIFICANT CHANGE UP (ref 3.5–5.3)
RBC # BLD: 2.84 M/UL — LOW (ref 3.8–5.2)
RBC # FLD: 14.9 % — HIGH (ref 10.3–14.5)
SODIUM SERPL-SCNC: 142 MMOL/L — SIGNIFICANT CHANGE UP (ref 135–145)
TSH SERPL-MCNC: 2.33 UIU/ML — SIGNIFICANT CHANGE UP (ref 0.27–4.2)
WBC # BLD: 7.29 K/UL — SIGNIFICANT CHANGE UP (ref 3.8–10.5)
WBC # FLD AUTO: 7.29 K/UL — SIGNIFICANT CHANGE UP (ref 3.8–10.5)

## 2022-01-28 PROCEDURE — 99221 1ST HOSP IP/OBS SF/LOW 40: CPT

## 2022-01-28 PROCEDURE — 99232 SBSQ HOSP IP/OBS MODERATE 35: CPT | Mod: GC

## 2022-01-28 RX ORDER — I.V. FAT EMULSION 20 G/100ML
1.18 EMULSION INTRAVENOUS
Qty: 50 | Refills: 0 | Status: DISCONTINUED | OUTPATIENT
Start: 2022-01-28 | End: 2022-01-28

## 2022-01-28 RX ORDER — MEGESTROL ACETATE 40 MG/ML
800 SUSPENSION ORAL EVERY 24 HOURS
Refills: 0 | Status: DISCONTINUED | OUTPATIENT
Start: 2022-01-28 | End: 2022-01-28

## 2022-01-28 RX ORDER — SODIUM CHLORIDE 9 MG/ML
500 INJECTION, SOLUTION INTRAVENOUS ONCE
Refills: 0 | Status: COMPLETED | OUTPATIENT
Start: 2022-01-28 | End: 2022-01-28

## 2022-01-28 RX ORDER — ELECTROLYTE SOLUTION,INJ
1 VIAL (ML) INTRAVENOUS
Refills: 0 | Status: DISCONTINUED | OUTPATIENT
Start: 2022-01-28 | End: 2022-01-28

## 2022-01-28 RX ORDER — MEGESTROL ACETATE 40 MG/ML
800 SUSPENSION ORAL EVERY 24 HOURS
Refills: 0 | Status: DISCONTINUED | OUTPATIENT
Start: 2022-01-28 | End: 2022-02-04

## 2022-01-28 RX ORDER — MEGESTROL ACETATE 40 MG/ML
20 SUSPENSION ORAL DAILY
Refills: 0 | Status: DISCONTINUED | OUTPATIENT
Start: 2022-01-28 | End: 2022-01-28

## 2022-01-28 RX ORDER — METOPROLOL TARTRATE 50 MG
50 TABLET ORAL DAILY
Refills: 0 | Status: DISCONTINUED | OUTPATIENT
Start: 2022-01-29 | End: 2022-02-04

## 2022-01-28 RX ORDER — METOPROLOL TARTRATE 50 MG
25 TABLET ORAL ONCE
Refills: 0 | Status: COMPLETED | OUTPATIENT
Start: 2022-01-28 | End: 2022-01-28

## 2022-01-28 RX ADMIN — Medication 25 MILLIGRAM(S): at 12:47

## 2022-01-28 RX ADMIN — SODIUM CHLORIDE 1000 MILLILITER(S): 9 INJECTION, SOLUTION INTRAVENOUS at 06:52

## 2022-01-28 RX ADMIN — I.V. FAT EMULSION 20.8 GM/KG/DAY: 20 EMULSION INTRAVENOUS at 22:01

## 2022-01-28 RX ADMIN — MEGESTROL ACETATE 800 MILLIGRAM(S): 40 SUSPENSION ORAL at 19:07

## 2022-01-28 RX ADMIN — AMIODARONE HYDROCHLORIDE 200 MILLIGRAM(S): 400 TABLET ORAL at 06:52

## 2022-01-28 RX ADMIN — LATANOPROST 1 DROP(S): 0.05 SOLUTION/ DROPS OPHTHALMIC; TOPICAL at 21:52

## 2022-01-28 RX ADMIN — Medication 25 MILLIGRAM(S): at 06:52

## 2022-01-28 RX ADMIN — Medication 50 EACH: at 18:01

## 2022-01-28 RX ADMIN — RIVAROXABAN 20 MILLIGRAM(S): KIT at 17:06

## 2022-01-28 RX ADMIN — OLANZAPINE 5 MILLIGRAM(S): 15 TABLET, FILM COATED ORAL at 21:52

## 2022-01-28 RX ADMIN — CHLORHEXIDINE GLUCONATE 1 APPLICATION(S): 213 SOLUTION TOPICAL at 06:30

## 2022-01-28 NOTE — PROGRESS NOTE ADULT - ASSESSMENT
86 yo female PMH of dementia, Afib, PPM (Indicated for tachy-yudith syndrome), cerebral LICA paraclinoid aneurysm (s/p stenting) presented with LBO w/ obstructing rectal mass now s/p rectal stent (1/21). Course c/b a-fib with RVR transferred to SICU s/p amio drip now stable and stepped down to tele    LRD, PICC/TPN   Home meds as appropriate  Ceftriaxone(1/21-1/26)  Pain/nausea PRN  Amio PO for AFib  Xarelto  ISS  AM labs  Dispo: CALE with TPN

## 2022-01-28 NOTE — PROGRESS NOTE ADULT - ASSESSMENT
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     # persistent tachycardia  check tsh( pt is on amiodarone)  US lower ext and consider CT chest if tachy still persists  ekg shows sinus tachy and cards consulted  echo unremarkable  Unlikely sepsis    #Hypotension  measure with pediatric cuff  if still low decrease metoprolol  w/f bleed    # Rectosigmoid cancer   s/p Flex Sig (1/21/22); obstructive mass seen in the rectosigmoid at ~10 cm s/p placement of uncovered 24 x 120 mm stent  having bowel movements as of 1/27  Possible colonoscopy this admission depending on ability to tolerate prep   consider Hemeonc consult    # Chronic atrial fibrillation   on Xarelto + metoprolol as outpatient  - Started on amiodarone earlier in admission: now on amiodarone 200mg qd + metoprolol succinate 25mg qd   - defer recs re: outpatient AC for atrial fibrillation to GI and Cards ; per cardiology, no need for bridging with IV heparin while holding AC     # Metabolic encephalopathy   get collateral info regarding baseline  tsh,B12, rpr  CT head neg on admission  avoid benzos/anticholinergics/restraints    # Urinary retention  Reyes replaced on 1.27.22 - Drained >1L upon placement   TOV per primary team;   encourage patient to attempt urination on commode--some retention may be positional     # BMI <19  BMI: BMI (kg/m2): 16.5 (01-22-22 @ 05:00)  Affects all aspects of care.  Dose medications by weight   On TPN     # Hypokalemia - repleted  Potassium, Serum: 4.0 mmol/L (01-27-22 @ 06:08)  # Hypophosphatemia - repleted  Phosphorus Level, Serum: 3.4 mg/dL (01-27-22 @ 06:08)    # vertebral compression fracture   CT abd from this admission showing ".  Severe L2 vertebral body compression deformity and retropulsed   posterior fragment moderately narrowing the central canal at this level."  [ ] back brace per NSGY consult . MRI pending  defer recs re: further imaging and management to NSGY consult.      # decreased po intake  consider NG feeds. Pt is not candidate for peg due to poor prognosis    #functional quadriplegia  PPS<30%  Palliative care on board to discuss GOC

## 2022-01-28 NOTE — PROGRESS NOTE ADULT - ATTENDING COMMENTS
Pt seen and d/w fellow this afternoon.  No complaints.  Unable to prep for full colonoscopy yesterday.

## 2022-01-28 NOTE — PROGRESS NOTE ADULT - ATTENDING COMMENTS
No complaints.  Denies abdominal pain.  Passing bowel movements. Not taking in much po.  Afebrile.  Sinus tachycardia persists. BP ok  Abd soft, ND, NT.    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  RLL pneumonia treated.  Persistent tachycardia of unclear etiology.  A-fib controlled. Urinary retention.  Her main issue is malnutrition and poor po intake.  1. Restart home dose of metoprolol  2. Encouraged po intake.  She refuses Ensure of all kinds.  3. ok to check LE duplex for DVT, but she is already on Xarelto for anticoagulation.  4. PT for OOB.  5. void check, reinsert johnson if not voiding well.  6. Enteral considerations for feeding discussed: Dobhoff (pulled out NGT immediately), PEG tube (concern for pulling out and leaving gastrotomy).  TPN is a difficult choice given risk of sepsis.  Appetite stimulant such as Megace can be considered.  Will observe oral intake over weekend, encourage her to eat.    Dr. Trevino covering until 1/31 No complaints.  Denies abdominal pain.  Passing bowel movements. Not taking in much po.  Afebrile.  Sinus tachycardia persists. BP ok  Abd soft, ND, NT.    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  RLL pneumonia treated.  Persistent tachycardia of unclear etiology.  A-fib controlled. Urinary retention.  Her main issue is malnutrition and poor po intake.  1. Restart home dose of metoprolol  2. Encouraged po intake. (low residue diet)  She refuses Ensure of all kinds.  3. ok to check LE duplex for DVT, but she is already on Xarelto for anticoagulation.  4. PT for OOB.  5. void check, reinsert johnson if not voiding well.  6. Enteral considerations for feeding discussed: Dobhoff (pulled out NGT immediately), PEG tube (concern for pulling out and leaving gastrotomy).  TPN is a difficult choice given risk of sepsis.  Appetite stimulant such as Megace can be considered.  Will observe oral intake over weekend, encourage her to eat.    Dr. Trevino covering until 1/31

## 2022-01-28 NOTE — PROGRESS NOTE ADULT - SUBJECTIVE AND OBJECTIVE BOX
GENERAL SURGERY PROGRESS NOTE    SUBJECTIVE: Patient seen and examined bedside with senior resident. Patient doing well this morning, no no/v, pain is controlled, sleepy. Low tachycardia overnight.    aMIOdarone    Tablet 200 milliGRAM(s) Oral daily  metoprolol succinate ER 25 milliGRAM(s) Oral daily  rivaroxaban 20 milliGRAM(s) Oral with dinner      Vital Signs Last 24 Hrs  T(C): 36.3 (2022 05:06), Max: 37.1 (2022 13:38)  T(F): 97.4 (2022 05:06), Max: 98.8 (2022 13:38)  HR: 113 (2022 05:06) (96 - 118)  BP: 143/66 (2022 05:06) (109/54 - 153/67)  BP(mean): 96 (2022 12:19) (90 - 96)  RR: 20 (2022 05:06) (18 - 26)  SpO2: 98% (2022 05:06) (95% - 98%)  I&O's Detail    2022 07:01  -  2022 07:00  --------------------------------------------------------  IN:  Total IN: 0 mL    OUT:    Indwelling Catheter - Urethral (mL): 2900 mL    Oral Fluid: 0 mL  Total OUT: 2900 mL    Total NET: -2900 mL          PHYSICAL EXAM    General: NAD, resting comfortably in bed  C/V: NSR  Pulm: Nonlabored breathing, no respiratory distress on room air  Abd: soft, ND, NT, no rebound tenderness, no guarding  Extrem: WWP, no edema, SCDs in place        LABS:                        8.2    6.00  )-----------( 212      ( 2022 06:08 )             27.2     01-    142  |  106  |  14  ----------------------------<  141<H>  4.0   |  31  |  0.50    Ca    8.0<L>      2022 06:08  Phos  3.4       Mg     2.1           PTT - ( 2022 06:08 )  PTT:30.5 sec  Urinalysis Basic - ( 2022 11:47 )    Color: Yellow / Appearance: Clear / S.020 / pH: x  Gluc: x / Ketone: NEGATIVE  / Bili: Negative / Urobili: 0.2 E.U./dL   Blood: x / Protein: NEGATIVE mg/dL / Nitrite: NEGATIVE   Leuk Esterase: NEGATIVE / RBC: x / WBC x   Sq Epi: x / Non Sq Epi: x / Bacteria: x        RADIOLOGY & ADDITIONAL STUDIES:
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
## 2022-01-28 NOTE — PROGRESS NOTE ADULT - SUBJECTIVE AND OBJECTIVE BOX
GASTROENTEROLOGY PROGRESS NOTE  Patient seen and examined at bedside. No new complaints,    PERTINENT REVIEW OF SYSTEMS:  As noted above    Allergies    No Known Allergies    Intolerances      MEDICATIONS:  MEDICATIONS  (STANDING):  aMIOdarone    Tablet 200 milliGRAM(s) Oral daily  chlorhexidine 2% Cloths 1 Application(s) Topical <User Schedule>  fat emulsion (Fish Oil and Plant Based) 20% Infusion 1.182 Gm/kG/Day (20.8 mL/Hr) IV Continuous <Continuous>  fat emulsion (Fish Oil and Plant Based) 20% Infusion 1.182 Gm/kG/Day (20.8 mL/Hr) IV Continuous <Continuous>  latanoprost 0.005% Ophthalmic Solution 1 Drop(s) Both EYES at bedtime  megestrol Suspension 800 milliGRAM(s) Oral every 24 hours  OLANZapine 5 milliGRAM(s) Oral at bedtime  Parenteral Nutrition - Adult 1 Each (50 mL/Hr) TPN Continuous <Continuous>  Parenteral Nutrition - Adult 1 Each (50 mL/Hr) TPN Continuous <Continuous>  rivaroxaban 20 milliGRAM(s) Oral with dinner    MEDICATIONS  (PRN):  sodium chloride 0.9% lock flush 10 milliLiter(s) IV Push every 1 hour PRN Pre/post blood products, medications, blood draw, and to maintain line patency    Vital Signs Last 24 Hrs  T(C): 36.7 (2022 14:00), Max: 36.9 (2022 16:20)  T(F): 98.1 (2022 14:00), Max: 98.5 (2022 16:20)  HR: 90 (2022 14:00) (90 - 118)  BP: 97/55 (2022 14:00) (97/55 - 143/68)  BP(mean): --  RR: 20 (2022 14:00) (19 - 22)  SpO2: 97% (2022 14:00) (97% - 98%)     @ 07:01  -   @ 07:00  --------------------------------------------------------  IN: 808 mL / OUT: 2900 mL / NET: -2092 mL     @ 07:01  -   @ 14:57  --------------------------------------------------------  IN: 162.4 mL / OUT: 325 mL / NET: -162.6 mL      PHYSICAL EXAM:    General: Well developed; in no acute distress  HEENT: MMM, conjunctiva and sclera clear  Gastrointestinal: Soft non-tender non-distended; No rebound or guarding  Skin: Warm and dry. No obvious rash    LABS:                        7.8    7.29  )-----------( 195      ( 2022 10:25 )             25.9         142  |  109<H>  |  17  ----------------------------<  135<H>  4.8   |  27  |  0.54    Ca    8.4      2022 10:25  Phos  3.0       Mg     2.3           PTT - ( 2022 06:08 )  PTT:30.5 sec      Urinalysis Basic - ( 2022 11:47 )    Color: Yellow / Appearance: Clear / S.020 / pH: x  Gluc: x / Ketone: NEGATIVE  / Bili: Negative / Urobili: 0.2 E.U./dL   Blood: x / Protein: NEGATIVE mg/dL / Nitrite: NEGATIVE   Leuk Esterase: NEGATIVE / RBC: x / WBC x   Sq Epi: x / Non Sq Epi: x / Bacteria: x                RADIOLOGY & ADDITIONAL STUDIES:  Reviewed

## 2022-01-28 NOTE — PROGRESS NOTE ADULT - ASSESSMENT
88yo female with afib (on Xarelto) dementia presents with weakness and poor PO intake. Exam significant for distention, CT with evidence of large bowel obstruction, narrowing of the rectum 9-12cm from the anal verge, differential includes malignancy vs infectious/inflammatory.     # Rectal stricture  s/p Flex Sig (1/21/22); An obstructive mass lesion seen in the rectosigmoid at ~10 cm s/p placement of uncovered 24 x 120 mm SEMS  - abdomen decompressed after stent placement  - Was planned for colonoscopy yday however pt was unable to be prepped for the same therefore was cancelled. No plan for reattempt at this time per primary team  - being planned for discharge with TPN to improve nutritional status  - May consider outpt colonoscopy if amenable   - Further plan per primary team     Case discussed with adv attending and primary team.     Recommendations discussed with primary team    Grant Kenney MD  PGY-5 GI fellow  Pager: [phone]

## 2022-01-29 LAB
ANION GAP SERPL CALC-SCNC: 8 MMOL/L — SIGNIFICANT CHANGE UP (ref 5–17)
BUN SERPL-MCNC: 20 MG/DL — SIGNIFICANT CHANGE UP (ref 7–23)
CALCIUM SERPL-MCNC: 8.9 MG/DL — SIGNIFICANT CHANGE UP (ref 8.4–10.5)
CHLORIDE SERPL-SCNC: 109 MMOL/L — HIGH (ref 96–108)
CHOLEST SERPL-MCNC: 109 MG/DL — SIGNIFICANT CHANGE UP
CO2 SERPL-SCNC: 25 MMOL/L — SIGNIFICANT CHANGE UP (ref 22–31)
CREAT SERPL-MCNC: 0.61 MG/DL — SIGNIFICANT CHANGE UP (ref 0.5–1.3)
GLUCOSE BLDC GLUCOMTR-MCNC: 141 MG/DL — HIGH (ref 70–99)
GLUCOSE BLDC GLUCOMTR-MCNC: 159 MG/DL — HIGH (ref 70–99)
GLUCOSE SERPL-MCNC: 117 MG/DL — HIGH (ref 70–99)
HCT VFR BLD CALC: 30.8 % — LOW (ref 34.5–45)
HDLC SERPL-MCNC: 38 MG/DL — LOW
HGB BLD-MCNC: 9.3 G/DL — LOW (ref 11.5–15.5)
LIPID PNL WITH DIRECT LDL SERPL: 51 MG/DL — SIGNIFICANT CHANGE UP
MAGNESIUM SERPL-MCNC: 2.5 MG/DL — SIGNIFICANT CHANGE UP (ref 1.6–2.6)
MCHC RBC-ENTMCNC: 28.4 PG — SIGNIFICANT CHANGE UP (ref 27–34)
MCHC RBC-ENTMCNC: 30.2 GM/DL — LOW (ref 32–36)
MCV RBC AUTO: 94.2 FL — SIGNIFICANT CHANGE UP (ref 80–100)
NON HDL CHOLESTEROL: 71 MG/DL — SIGNIFICANT CHANGE UP
NRBC # BLD: 0 /100 WBCS — SIGNIFICANT CHANGE UP (ref 0–0)
PHOSPHATE SERPL-MCNC: 3.6 MG/DL — SIGNIFICANT CHANGE UP (ref 2.5–4.5)
PLATELET # BLD AUTO: 184 K/UL — SIGNIFICANT CHANGE UP (ref 150–400)
POTASSIUM SERPL-MCNC: 4.7 MMOL/L — SIGNIFICANT CHANGE UP (ref 3.5–5.3)
POTASSIUM SERPL-SCNC: 4.7 MMOL/L — SIGNIFICANT CHANGE UP (ref 3.5–5.3)
RBC # BLD: 3.27 M/UL — LOW (ref 3.8–5.2)
RBC # FLD: 15 % — HIGH (ref 10.3–14.5)
SODIUM SERPL-SCNC: 142 MMOL/L — SIGNIFICANT CHANGE UP (ref 135–145)
TRIGL SERPL-MCNC: 100 MG/DL — SIGNIFICANT CHANGE UP
VIT B12 SERPL-MCNC: 555 PG/ML — SIGNIFICANT CHANGE UP (ref 232–1245)
WBC # BLD: 6.77 K/UL — SIGNIFICANT CHANGE UP (ref 3.8–10.5)
WBC # FLD AUTO: 6.77 K/UL — SIGNIFICANT CHANGE UP (ref 3.8–10.5)

## 2022-01-29 PROCEDURE — 99233 SBSQ HOSP IP/OBS HIGH 50: CPT

## 2022-01-29 RX ORDER — I.V. FAT EMULSION 20 G/100ML
1.18 EMULSION INTRAVENOUS
Qty: 50 | Refills: 0 | Status: DISCONTINUED | OUTPATIENT
Start: 2022-01-29 | End: 2022-01-29

## 2022-01-29 RX ORDER — ELECTROLYTE SOLUTION,INJ
1 VIAL (ML) INTRAVENOUS
Refills: 0 | Status: DISCONTINUED | OUTPATIENT
Start: 2022-01-29 | End: 2022-01-29

## 2022-01-29 RX ADMIN — Medication 1 EACH: at 18:06

## 2022-01-29 RX ADMIN — AMIODARONE HYDROCHLORIDE 200 MILLIGRAM(S): 400 TABLET ORAL at 06:28

## 2022-01-29 RX ADMIN — Medication 50 MILLIGRAM(S): at 06:27

## 2022-01-29 RX ADMIN — OLANZAPINE 5 MILLIGRAM(S): 15 TABLET, FILM COATED ORAL at 21:47

## 2022-01-29 RX ADMIN — CHLORHEXIDINE GLUCONATE 1 APPLICATION(S): 213 SOLUTION TOPICAL at 18:58

## 2022-01-29 RX ADMIN — LATANOPROST 1 DROP(S): 0.05 SOLUTION/ DROPS OPHTHALMIC; TOPICAL at 21:47

## 2022-01-29 RX ADMIN — MEGESTROL ACETATE 800 MILLIGRAM(S): 40 SUSPENSION ORAL at 18:16

## 2022-01-29 RX ADMIN — I.V. FAT EMULSION 20.8 GM/KG/DAY: 20 EMULSION INTRAVENOUS at 22:01

## 2022-01-29 RX ADMIN — RIVAROXABAN 20 MILLIGRAM(S): KIT at 18:06

## 2022-01-29 NOTE — PROGRESS NOTE ADULT - SUBJECTIVE AND OBJECTIVE BOX
SUBJECTIVE: Doing well this AM. NAEON. Denies n/v. Endorses f/bm. Denies cp/sob. Pain is well controlled. Ambulating as tolerated. Tolerating diet.      MEDICATIONS  (STANDING):  aMIOdarone    Tablet 200 milliGRAM(s) Oral daily  chlorhexidine 2% Cloths 1 Application(s) Topical <User Schedule>  fat emulsion (Fish Oil and Plant Based) 20% Infusion 1.182 Gm/kG/Day (20.8 mL/Hr) IV Continuous <Continuous>  fat emulsion (Fish Oil and Plant Based) 20% Infusion 1.182 Gm/kG/Day (20.8 mL/Hr) IV Continuous <Continuous>  latanoprost 0.005% Ophthalmic Solution 1 Drop(s) Both EYES at bedtime  megestrol Suspension 800 milliGRAM(s) Oral every 24 hours  metoprolol succinate ER 50 milliGRAM(s) Oral daily  OLANZapine 5 milliGRAM(s) Oral at bedtime  Parenteral Nutrition - Adult 1 Each (50 mL/Hr) TPN Continuous <Continuous>  Parenteral Nutrition - Adult 1 Each (50 mL/Hr) TPN Continuous <Continuous>  rivaroxaban 20 milliGRAM(s) Oral with dinner    MEDICATIONS  (PRN):  sodium chloride 0.9% lock flush 10 milliLiter(s) IV Push every 1 hour PRN Pre/post blood products, medications, blood draw, and to maintain line patency      Vital Signs Last 24 Hrs  T(C): 36.4 (2022 09:24), Max: 36.7 (2022 14:00)  T(F): 97.6 (2022 09:24), Max: 98.1 (2022 14:00)  HR: 84 (2022 09:24) (84 - 98)  BP: 153/83 (2022 09:24) (97/55 - 153/83)  BP(mean): --  RR: 15 (2022 09:24) (15 - 20)  SpO2: 98% (2022 09:24) (95% - 98%)    Physical Exam:  General: NAD, resting comfortably in bed  Pulmonary: Nonlabored breathing, no respiratory distress  Cardiovascular: NSR  Abdominal: soft, NT/ND  Extremities: WWP, normal strength  Neuro: A/O x 3, CNs II-XII grossly intact, no focal deficits    I&O's Summary    2022 07:01  -  2022 07:00  --------------------------------------------------------  IN: 612.4 mL / OUT: 925 mL / NET: -312.6 mL        LABS:                        9.3    6.77  )-----------( 184      ( 2022 06:21 )             30.8         142  |  109<H>  |  20  ----------------------------<  117<H>  4.7   |  25  |  0.61    Ca    8.9      2022 06:21  Phos  3.6       Mg     2.5             Urinalysis Basic - ( 2022 11:47 )    Color: Yellow / Appearance: Clear / S.020 / pH: x  Gluc: x / Ketone: NEGATIVE  / Bili: Negative / Urobili: 0.2 E.U./dL   Blood: x / Protein: NEGATIVE mg/dL / Nitrite: NEGATIVE   Leuk Esterase: NEGATIVE / RBC: x / WBC x   Sq Epi: x / Non Sq Epi: x / Bacteria: x      CAPILLARY BLOOD GLUCOSE            RADIOLOGY & ADDITIONAL STUDIES:

## 2022-01-29 NOTE — PROGRESS NOTE ADULT - ASSESSMENT
88 yo female PMH of dementia, Afib, PPM (Indicated for tachy-yudith syndrome), cerebral LICA paraclinoid aneurysm (s/p stenting) presented with LBO w/ obstructing rectal mass now s/p rectal stent (1/21). Course c/b a-fib with RVR transferred to SICU s/p amio drip now stable and stepped down to tele    LRD, PICC/TPN   Home meds as appropriate  Ceftriaxone(1/21-1/26)  Pain/nausea PRN  Amio/Metoprolol for AFib  Xarelto  Megace appetite stimulant  ISS  AM labs  Dispo: [facility]

## 2022-01-29 NOTE — PROGRESS NOTE ADULT - ASSESSMENT
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     # persistent tachycardia  Resolved/improving  echo unremarkable  Unlikely sepsis    #Hypotension  This has improved    # Rectosigmoid cancer   s/p Flex Sig (1/21/22); obstructive mass seen in the rectosigmoid at ~10 cm s/p placement of uncovered 24 x 120 mm stent  having bowel movements as of 1/27  consider Hemeonc consult    # Chronic atrial fibrillation   on Xarelto + metoprolol as outpatient  - Started on amiodarone earlier in admission: now on amiodarone 200mg qd + metoprolol succinate 25mg qd   - defer recs re: outpatient AC for atrial fibrillation to GI and Cards ; per cardiology, no need for bridging with IV heparin while holding AC     # Metabolic encephalopathy   get collateral info regarding baseline  CT head neg on admission  avoid benzos/anticholinergics/restraints    # Urinary retention  Reyes replaced on 1.27.22 - Drained >1L upon placement   TOV per primary team;   encourage patient to attempt urination on commode--some retention may be positional     # BMI <19  BMI: BMI (kg/m2): 16.5 (01-22-22 @ 05:00)  Affects all aspects of care.  Dose medications by weight   On TPN     # Hypokalemia - repleted  Potassium, Serum: 4.0 mmol/L (01-27-22 @ 06:08)  # Hypophosphatemia - repleted  Phosphorus Level, Serum: 3.4 mg/dL (01-27-22 @ 06:08)    # vertebral compression fracture   CT abd from this admission showing ".  Severe L2 vertebral body compression deformity and retropulsed   posterior fragment moderately narrowing the central canal at this level."  [ ] back brace per NSGY consult . MRI pending  defer recs re: further imaging and management to NSGY consult.      # decreased po intake  consider NG feeds. Pt is not candidate for peg due to poor prognosis    #functional quadriplegia  PPS<30%  Palliative care on board to discuss GOC

## 2022-01-29 NOTE — PROGRESS NOTE ADULT - SUBJECTIVE AND OBJECTIVE BOX
Subjective: seen bedside. no acute complaints. no overnight events.     Vital Signs Last 24 Hrs  VS stable    PHYSICAL EXAM:  GENERAL: NAD, well-groomed, well-developed  HEAD:  Atraumatic, Normocephalic  EYES: EOMI,   NECK: Supple, No JVD,   CHEST/LUNG: Clear to percussion bilaterally  HEART: Regular rate and rhythm  ABDOMEN: Soft, Nontender, Nondistended; Bowel sounds present  EXTREMITIES:  No edema  SKIN: No rashes or lesions    LABS:  reviewed

## 2022-01-30 LAB
ALBUMIN SERPL ELPH-MCNC: 2.5 G/DL — LOW (ref 3.3–5)
ANION GAP SERPL CALC-SCNC: 7 MMOL/L — SIGNIFICANT CHANGE UP (ref 5–17)
BUN SERPL-MCNC: 23 MG/DL — SIGNIFICANT CHANGE UP (ref 7–23)
CALCIUM SERPL-MCNC: 8.2 MG/DL — LOW (ref 8.4–10.5)
CHLORIDE SERPL-SCNC: 106 MMOL/L — SIGNIFICANT CHANGE UP (ref 96–108)
CO2 SERPL-SCNC: 26 MMOL/L — SIGNIFICANT CHANGE UP (ref 22–31)
CREAT SERPL-MCNC: 0.55 MG/DL — SIGNIFICANT CHANGE UP (ref 0.5–1.3)
GLUCOSE BLDC GLUCOMTR-MCNC: 102 MG/DL — HIGH (ref 70–99)
GLUCOSE BLDC GLUCOMTR-MCNC: 114 MG/DL — HIGH (ref 70–99)
GLUCOSE BLDC GLUCOMTR-MCNC: 121 MG/DL — HIGH (ref 70–99)
GLUCOSE BLDC GLUCOMTR-MCNC: 148 MG/DL — HIGH (ref 70–99)
GLUCOSE SERPL-MCNC: 129 MG/DL — HIGH (ref 70–99)
HCT VFR BLD CALC: 28.2 % — LOW (ref 34.5–45)
HGB BLD-MCNC: 8.6 G/DL — LOW (ref 11.5–15.5)
MAGNESIUM SERPL-MCNC: 2.3 MG/DL — SIGNIFICANT CHANGE UP (ref 1.6–2.6)
MCHC RBC-ENTMCNC: 28.1 PG — SIGNIFICANT CHANGE UP (ref 27–34)
MCHC RBC-ENTMCNC: 30.5 GM/DL — LOW (ref 32–36)
MCV RBC AUTO: 92.2 FL — SIGNIFICANT CHANGE UP (ref 80–100)
NRBC # BLD: 0 /100 WBCS — SIGNIFICANT CHANGE UP (ref 0–0)
PHOSPHATE SERPL-MCNC: 4.3 MG/DL — SIGNIFICANT CHANGE UP (ref 2.5–4.5)
PLATELET # BLD AUTO: 187 K/UL — SIGNIFICANT CHANGE UP (ref 150–400)
POTASSIUM SERPL-MCNC: 4.7 MMOL/L — SIGNIFICANT CHANGE UP (ref 3.5–5.3)
POTASSIUM SERPL-SCNC: 4.7 MMOL/L — SIGNIFICANT CHANGE UP (ref 3.5–5.3)
PREALB SERPL-MCNC: 10 MG/DL — LOW (ref 20–40)
RBC # BLD: 3.06 M/UL — LOW (ref 3.8–5.2)
RBC # FLD: 15.1 % — HIGH (ref 10.3–14.5)
SODIUM SERPL-SCNC: 139 MMOL/L — SIGNIFICANT CHANGE UP (ref 135–145)
T PALLIDUM AB TITR SER: NEGATIVE — SIGNIFICANT CHANGE UP
TRANSFERRIN SERPL-MCNC: 143 MG/DL — LOW (ref 200–360)
WBC # BLD: 6.7 K/UL — SIGNIFICANT CHANGE UP (ref 3.8–10.5)
WBC # FLD AUTO: 6.7 K/UL — SIGNIFICANT CHANGE UP (ref 3.8–10.5)

## 2022-01-30 PROCEDURE — 99232 SBSQ HOSP IP/OBS MODERATE 35: CPT

## 2022-01-30 PROCEDURE — 93970 EXTREMITY STUDY: CPT | Mod: 26

## 2022-01-30 RX ORDER — ELECTROLYTE SOLUTION,INJ
1 VIAL (ML) INTRAVENOUS
Refills: 0 | Status: DISCONTINUED | OUTPATIENT
Start: 2022-01-30 | End: 2022-01-30

## 2022-01-30 RX ORDER — I.V. FAT EMULSION 20 G/100ML
1.18 EMULSION INTRAVENOUS
Qty: 50 | Refills: 0 | Status: DISCONTINUED | OUTPATIENT
Start: 2022-01-30 | End: 2022-01-30

## 2022-01-30 RX ADMIN — CHLORHEXIDINE GLUCONATE 1 APPLICATION(S): 213 SOLUTION TOPICAL at 05:46

## 2022-01-30 RX ADMIN — I.V. FAT EMULSION 20.8 GM/KG/DAY: 20 EMULSION INTRAVENOUS at 22:21

## 2022-01-30 RX ADMIN — Medication 1 EACH: at 18:26

## 2022-01-30 RX ADMIN — MEGESTROL ACETATE 800 MILLIGRAM(S): 40 SUSPENSION ORAL at 19:00

## 2022-01-30 RX ADMIN — Medication 50 MILLIGRAM(S): at 05:46

## 2022-01-30 RX ADMIN — LATANOPROST 1 DROP(S): 0.05 SOLUTION/ DROPS OPHTHALMIC; TOPICAL at 22:05

## 2022-01-30 RX ADMIN — RIVAROXABAN 20 MILLIGRAM(S): KIT at 17:30

## 2022-01-30 RX ADMIN — OLANZAPINE 5 MILLIGRAM(S): 15 TABLET, FILM COATED ORAL at 22:05

## 2022-01-30 RX ADMIN — AMIODARONE HYDROCHLORIDE 200 MILLIGRAM(S): 400 TABLET ORAL at 05:46

## 2022-01-30 NOTE — CONSULT NOTE ADULT - PROVIDER SPECIALTY LIST ADULT
Hospitalist
Neurosurgery
Cardiology
Hospitalist
Palliative Care
Gastroenterology
Intervent Radiology
SICU

## 2022-01-30 NOTE — CONSULT NOTE ADULT - ASSESSMENT
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     #AMS/worsening dementia  Dementia workup normal  Suspect 2/2 worsened chronic state      # persistent tachycardia  Resolved/improving  echo unremarkable  Unlikely sepsis    #Hypotension  This has improved    # Rectosigmoid cancer   s/p Flex Sig (1/21/22); obstructive mass seen in the rectosigmoid at ~10 cm s/p placement of uncovered 24 x 120 mm stent  having bowel movements as of 1/27  consider Hemeonc consult    # Chronic atrial fibrillation   on Xarelto + metoprolol as outpatient  - Started on amiodarone earlier in admission: now on amiodarone 200mg qd + metoprolol succinate 25mg qd   - defer recs re: outpatient AC for atrial fibrillation to GI and Cards ; per cardiology, no need for bridging with IV heparin while holding AC     # Metabolic encephalopathy   get collateral info regarding baseline  CT head neg on admission  avoid benzos/anticholinergics/restraints    # Urinary retention  Reyes replaced on 1.27.22 - Drained >1L upon placement   TOV per primary team;   encourage patient to attempt urination on commode--some retention may be positional     # BMI <19  BMI: BMI (kg/m2): 16.5 (01-22-22 @ 05:00)  Affects all aspects of care.  Dose medications by weight   On TPN     # Hypokalemia - repleted  Potassium, Serum: 4.0 mmol/L (01-27-22 @ 06:08)  # Hypophosphatemia - repleted  Phosphorus Level, Serum: 3.4 mg/dL (01-27-22 @ 06:08)    # vertebral compression fracture   CT abd from this admission showing ".  Severe L2 vertebral body compression deformity and retropulsed   posterior fragment moderately narrowing the central canal at this level."  [ ] back brace per NSGY consult . MRI pending  defer recs re: further imaging and management to NSGY consult.      # decreased po intake  consider NG feeds. Pt is not candidate for peg due to poor prognosis    #functional quadriplegia  PPS<30%  Palliative care on board to discuss GOC

## 2022-01-30 NOTE — PROGRESS NOTE ADULT - SUBJECTIVE AND OBJECTIVE BOX
SUBJECTIVE: Doing well this AM. Has had poor oral intake yesterday. According to nurse has had one episode of small BM. Denies fever, chills, dizziness, light-headedness, HA, N/V/CP, SOB, or pain in extremities. Pain is well controlled. Is sitting in bed. Endorses fair appetite and tolerating diet but with very limited intake.    MEDICATIONS  (STANDING):  aMIOdarone    Tablet 200 milliGRAM(s) Oral daily  chlorhexidine 2% Cloths 1 Application(s) Topical <User Schedule>  fat emulsion (Fish Oil and Plant Based) 20% Infusion 1.182 Gm/kG/Day (20.8 mL/Hr) IV Continuous <Continuous>  fat emulsion (Fish Oil and Plant Based) 20% Infusion 1.182 Gm/kG/Day (20.8 mL/Hr) IV Continuous <Continuous>  latanoprost 0.005% Ophthalmic Solution 1 Drop(s) Both EYES at bedtime  megestrol Suspension 800 milliGRAM(s) Oral every 24 hours  metoprolol succinate ER 50 milliGRAM(s) Oral daily  OLANZapine 5 milliGRAM(s) Oral at bedtime  Parenteral Nutrition - Adult 1 Each (50 mL/Hr) TPN Continuous <Continuous>  Parenteral Nutrition - Adult 1 Each (25 mL/Hr) TPN Continuous <Continuous>  rivaroxaban 20 milliGRAM(s) Oral with dinner    MEDICATIONS  (PRN):  sodium chloride 0.9% lock flush 10 milliLiter(s) IV Push every 1 hour PRN Pre/post blood products, medications, blood draw, and to maintain line patency      Vital Signs Last 24 Hrs  T(C): 36.9 (30 Jan 2022 13:45), Max: 37.1 (30 Jan 2022 05:04)  T(F): 98.4 (30 Jan 2022 13:45), Max: 98.7 (30 Jan 2022 05:04)  HR: 100 (30 Jan 2022 13:45) (86 - 100)  BP: 93/53 (30 Jan 2022 13:45) (93/53 - 132/64)  BP(mean): --  RR: 17 (30 Jan 2022 13:45) (17 - 19)  SpO2: 100% (30 Jan 2022 13:45) (98% - 100%)    Physical Exam:  General: NAD, resting comfortably in bed  Pulmonary: Nonlabored breathing, no respiratory distress  Cardiovascular: NSR  Abdominal: soft, NT/ND  Extremities: WWP, normal strength  Neuro: CNs II-XII grossly intact, no focal deficits    I&O's Summary    29 Jan 2022 07:01  -  30 Jan 2022 07:00  --------------------------------------------------------  IN: 1399.6 mL / OUT: 1650 mL / NET: -250.4 mL    30 Jan 2022 07:01  -  30 Jan 2022 14:14  --------------------------------------------------------  IN: 0 mL / OUT: 1200 mL / NET: -1200 mL        LABS:                        8.6    6.70  )-----------( 187      ( 30 Jan 2022 06:55 )             28.2     01-30    139  |  106  |  23  ----------------------------<  129<H>  4.7   |  26  |  0.55    Ca    8.2<L>      30 Jan 2022 06:55  Phos  4.3     01-30  Mg     2.3     01-30    TPro  x   /  Alb  2.5<L>  /  TBili  x   /  DBili  x   /  AST  x   /  ALT  x   /  AlkPhos  x   01-30        CAPILLARY BLOOD GLUCOSE      POCT Blood Glucose.: 121 mg/dL (30 Jan 2022 11:44)  POCT Blood Glucose.: 114 mg/dL (30 Jan 2022 07:27)  POCT Blood Glucose.: 141 mg/dL (29 Jan 2022 17:10)    LIVER FUNCTIONS - ( 30 Jan 2022 06:55 )  Alb: 2.5 g/dL / Pro: x     / ALK PHOS: x     / ALT: x     / AST: x     / GGT: x             RADIOLOGY & ADDITIONAL STUDIES:

## 2022-01-30 NOTE — PROGRESS NOTE ADULT - ASSESSMENT
88 yo female PMH of dementia, Afib, PPM (Indicated for tachy-yudith syndrome), cerebral LICA paraclinoid aneurysm (s/p stenting) presented with LBO w/ obstructing rectal mass now s/p rectal stent (1/21). Course c/b a-fib with RVR transferred to SICU s/p amio drip now stable and stepped down to tele. Has had limited PO intake. Nutritional lab markers including albumin, prealbumin and transferrin all low. Started calorie count and added Ensure Max TID.    LRD with Ensure MAx TID  PICC/TPN (decrease volume to half)  Home meds as appropriate  IVAB: Ceftriaxone(1/20-1/25) plus Azithromycin (1/21-1/24)  Pain/nausea PRN  Amio/Metoprolol for AFib  Xarelto  Megace appetite stimulant  ISS  AM labs  Calorie count  Dispo: [facility]

## 2022-01-31 DIAGNOSIS — F03.90 UNSPECIFIED DEMENTIA WITHOUT BEHAVIORAL DISTURBANCE: ICD-10-CM

## 2022-01-31 DIAGNOSIS — E43 UNSPECIFIED SEVERE PROTEIN-CALORIE MALNUTRITION: ICD-10-CM

## 2022-01-31 DIAGNOSIS — R63.0 ANOREXIA: ICD-10-CM

## 2022-01-31 LAB
ANION GAP SERPL CALC-SCNC: 10 MMOL/L — SIGNIFICANT CHANGE UP (ref 5–17)
BUN SERPL-MCNC: 23 MG/DL — SIGNIFICANT CHANGE UP (ref 7–23)
CALCIUM SERPL-MCNC: 8.2 MG/DL — LOW (ref 8.4–10.5)
CHLORIDE SERPL-SCNC: 109 MMOL/L — HIGH (ref 96–108)
CO2 SERPL-SCNC: 25 MMOL/L — SIGNIFICANT CHANGE UP (ref 22–31)
CREAT SERPL-MCNC: 0.64 MG/DL — SIGNIFICANT CHANGE UP (ref 0.5–1.3)
GLUCOSE SERPL-MCNC: 120 MG/DL — HIGH (ref 70–99)
HCT VFR BLD CALC: 24.1 % — LOW (ref 34.5–45)
HGB BLD-MCNC: 7.4 G/DL — LOW (ref 11.5–15.5)
MAGNESIUM SERPL-MCNC: 2.3 MG/DL — SIGNIFICANT CHANGE UP (ref 1.6–2.6)
MCHC RBC-ENTMCNC: 28.2 PG — SIGNIFICANT CHANGE UP (ref 27–34)
MCHC RBC-ENTMCNC: 30.7 GM/DL — LOW (ref 32–36)
MCV RBC AUTO: 92 FL — SIGNIFICANT CHANGE UP (ref 80–100)
NRBC # BLD: 0 /100 WBCS — SIGNIFICANT CHANGE UP (ref 0–0)
PHOSPHATE SERPL-MCNC: 4.2 MG/DL — SIGNIFICANT CHANGE UP (ref 2.5–4.5)
PLATELET # BLD AUTO: 142 K/UL — LOW (ref 150–400)
POTASSIUM SERPL-MCNC: 4.3 MMOL/L — SIGNIFICANT CHANGE UP (ref 3.5–5.3)
POTASSIUM SERPL-SCNC: 4.3 MMOL/L — SIGNIFICANT CHANGE UP (ref 3.5–5.3)
RBC # BLD: 2.62 M/UL — LOW (ref 3.8–5.2)
RBC # FLD: 15.3 % — HIGH (ref 10.3–14.5)
SODIUM SERPL-SCNC: 144 MMOL/L — SIGNIFICANT CHANGE UP (ref 135–145)
WBC # BLD: 5.88 K/UL — SIGNIFICANT CHANGE UP (ref 3.8–10.5)
WBC # FLD AUTO: 5.88 K/UL — SIGNIFICANT CHANGE UP (ref 3.8–10.5)

## 2022-01-31 PROCEDURE — 99233 SBSQ HOSP IP/OBS HIGH 50: CPT

## 2022-01-31 PROCEDURE — 72158 MRI LUMBAR SPINE W/O & W/DYE: CPT | Mod: 26

## 2022-01-31 RX ORDER — ELECTROLYTE SOLUTION,INJ
1 VIAL (ML) INTRAVENOUS
Refills: 0 | Status: DISCONTINUED | OUTPATIENT
Start: 2022-01-31 | End: 2022-01-31

## 2022-01-31 RX ORDER — I.V. FAT EMULSION 20 G/100ML
1.18 EMULSION INTRAVENOUS
Qty: 50 | Refills: 0 | Status: DISCONTINUED | OUTPATIENT
Start: 2022-01-31 | End: 2022-01-31

## 2022-01-31 RX ORDER — OLANZAPINE 15 MG/1
2.5 TABLET, FILM COATED ORAL ONCE
Refills: 0 | Status: DISCONTINUED | OUTPATIENT
Start: 2022-01-31 | End: 2022-02-04

## 2022-01-31 RX ADMIN — CHLORHEXIDINE GLUCONATE 1 APPLICATION(S): 213 SOLUTION TOPICAL at 06:03

## 2022-01-31 RX ADMIN — LATANOPROST 1 DROP(S): 0.05 SOLUTION/ DROPS OPHTHALMIC; TOPICAL at 21:00

## 2022-01-31 RX ADMIN — MEGESTROL ACETATE 800 MILLIGRAM(S): 40 SUSPENSION ORAL at 18:22

## 2022-01-31 RX ADMIN — Medication 50 MILLIGRAM(S): at 06:03

## 2022-01-31 RX ADMIN — OLANZAPINE 5 MILLIGRAM(S): 15 TABLET, FILM COATED ORAL at 22:24

## 2022-01-31 RX ADMIN — Medication 1 EACH: at 17:31

## 2022-01-31 RX ADMIN — I.V. FAT EMULSION 20.8 GM/KG/DAY: 20 EMULSION INTRAVENOUS at 21:01

## 2022-01-31 RX ADMIN — AMIODARONE HYDROCHLORIDE 200 MILLIGRAM(S): 400 TABLET ORAL at 06:03

## 2022-01-31 RX ADMIN — RIVAROXABAN 20 MILLIGRAM(S): KIT at 17:38

## 2022-01-31 NOTE — PROGRESS NOTE ADULT - PROBLEM SELECTOR PLAN 7
.  Patient is Full Code  -daughter (Blanca) is HCP, [phone]  -plan for family meeting tomorrow, home hospice referral has already been broached

## 2022-01-31 NOTE — PROGRESS NOTE ADULT - PROBLEM SELECTOR PLAN 4
.  Poor nutritional intake and significant weight loss in the setting of advanced illness  -encourage PO intake and supplements

## 2022-01-31 NOTE — CHART NOTE - NSCHARTNOTEFT_GEN_A_CORE
PPM set to DOO 80 for MRI  after MRI set back to original settings DDD 50/130    Marshall French MD JUANITA  Cardiovascular Disease Fellow, PGY-4

## 2022-01-31 NOTE — PROGRESS NOTE ADULT - PROBLEM SELECTOR PLAN 8
.  Complex medical decision making / symptom management in the setting of advanced illness.    Will continue to follow for ongoing GOC discussion / titration of palliative regimen.   Emotional support provided, questions answered.  Active Psychosocial Referrals:  [x]Social Work/Case management [x]PT/OT []Chaplaincy []Hospice  [x]Patient/Family Support []Holistic RN []Massage Therapy []Ethics  Coping: [x] well [] with difficulty [] poor coping [] unable to assess  Support system: [] strong [x] adequate [] inadequate    For new or uncontrolled symptoms, please call Palliative Care at [phone]. The service is available 24/7 (including nights & weekends) to provide symptom management recommendations over the phone as appropriate

## 2022-01-31 NOTE — PROGRESS NOTE ADULT - SUBJECTIVE AND OBJECTIVE BOX
GENERAL SURGERY PROGRESS NOTE    SUBJECTIVE: Patient seen and examined bedside with senior resident. Patient sleeping, +flatus, +BM yesterday slightly blood tinged.     aMIOdarone    Tablet 200 milliGRAM(s) Oral daily  metoprolol succinate ER 50 milliGRAM(s) Oral daily  rivaroxaban 20 milliGRAM(s) Oral with dinner      Vital Signs Last 24 Hrs  T(C): 36.3 (31 Jan 2022 06:01), Max: 36.9 (30 Jan 2022 13:45)  T(F): 97.4 (31 Jan 2022 06:01), Max: 98.4 (30 Jan 2022 13:45)  HR: 84 (31 Jan 2022 06:01) (84 - 100)  BP: 128/58 (31 Jan 2022 06:01) (93/50 - 128/58)  BP(mean): --  RR: 16 (31 Jan 2022 06:01) (16 - 18)  SpO2: 100% (31 Jan 2022 06:01) (100% - 100%)  I&O's Detail    30 Jan 2022 07:01  -  31 Jan 2022 07:00  --------------------------------------------------------  IN:    Fat Emulsion (Fish Oil &amp; Plant Based) 20% Infusion: 208 mL    TPN (Total Parenteral Nutrition): 300 mL  Total IN: 508 mL    OUT:    Indwelling Catheter - Urethral (mL): 1800 mL    Voided (mL): 350 mL  Total OUT: 2150 mL    Total NET: -1642 mL          PHYSICAL EXAM    General: NAD, resting comfortably in bed  C/V: NSR  Pulm: Nonlabored breathing, no respiratory distress on room air  Abd: soft, ND, NT, no rebound tenderness, no guarding  Extrem: WWP, no edema, SCDs in place        LABS:                        8.6    6.70  )-----------( 187      ( 30 Jan 2022 06:55 )             28.2     01-30    139  |  106  |  23  ----------------------------<  129<H>  4.7   |  26  |  0.55    Ca    8.2<L>      30 Jan 2022 06:55  Phos  4.3     01-30  Mg     2.3     01-30    TPro  x   /  Alb  2.5<L>  /  TBili  x   /  DBili  x   /  AST  x   /  ALT  x   /  AlkPhos  x   01-30          RADIOLOGY & ADDITIONAL STUDIES:

## 2022-01-31 NOTE — PROGRESS NOTE ADULT - PROBLEM SELECTOR PLAN 1
.  No significant improvement despite Megace and Zyprexa  -Zyprexa has synergistic effect with Megace for appetite stimulation/weight gain especially in cancer-related anorexia

## 2022-01-31 NOTE — CHART NOTE - NSCHARTNOTEFT_GEN_A_CORE
Calorie count ordered from 1/28-1/30 and due for assessment today (1/31). Collect sheets from 1/28 and 1/30, no sheet for 1/29. Please see breakdown below. Overall, calorie count reveals pt meeting <25% of nutrient needs via po intake. Started on Megace 1/28 and TPN decreased to 35g AA, 150g dextrose, 50g SMOF to start weaning since pt with functional GI tract and no plan for TPN at d/c. Palliative follow for goals of care discussion. Spoke with Team 4 via phone today to discuss nutrition plan. Reviewed calorie count results with team. Team states goal is for pt to d/c to [facility] once po intake increases. Discussed changing up supplements to offer a few different options and MD agreeable. Also, pt not appropriate for EN d/t hx of dementia and will not improve overall quality of life. Will continue to monitor and follow per protocol.     1/28/22:  Breakfast- no po intake documented  Lunch- no po intake documented  Dinner- 25% of grilled chicken, mash potatoes, green beans, chicken noodle soup, ice cream, apple juice  -5 bites of pizza (brought in by family) and 4 bites of banana   *Overall po nutrient intake     1/29/22:  -No sheet so unable to assess intake    1/30/22:   Breakfast- 25% of eggs, cream of wheat, coffee, cranberry juice, fruit cup, Ensure Max protein  Lunch- 50% dinner roll, 25% of Gingerale and Ensure Max  Dinner- 25% of salmon, mash potatoes, and Ensure Max   *Overall po nutrient intake ~400-450 kcals, 30-35g protein

## 2022-01-31 NOTE — PROGRESS NOTE ADULT - ASSESSMENT
88 yo F PMH Dementia, Afib who presented with weakness, abdominal pain, and decreased appetite. Palliative Medicine consulted for complex decision making and to determin overall goals of care in setting of rectal mass.    ·	no significant change in patient's nutritional status despite Megace and Zyprexa (Zyprexa has synergistic effect with Megace for appetite stimulation especially in cancer-related anorexia) -> can decrease to 2.5mg PO qhs if concerned about sleepiness  ·	plan for meeting with patient's daughters tomorrow at 12pm; HCP understands patient is not optimized for treatment  ·	patient is eligible for home hospice from dementia criteria (bedbound, dysphagia/malnutrition) w/ untreated locally advanced CRC as supporting evidence for limited life expectancy

## 2022-01-31 NOTE — PROGRESS NOTE ADULT - ASSESSMENT
88 yo female PMH of dementia, Afib, PPM (Indicated for tachy-yudith syndrome), cerebral LICA paraclinoid aneurysm (s/p stenting) presented with LBO w/ obstructing rectal mass now s/p rectal stent (1/21). Course c/b a-fib with RVR transferred to SICU s/p amio drip now stable and stepped down to tele. LE Duplex (1/30) no DVT    LRD, PICC/TPN   Home meds as appropriate  Ceftriaxone(1/21-1/26)  Pain/nausea PRN  Amio/Metoprolol for AFib  Xarelto  Megace appetite stimulant  ISS  AM labs  Dispo: [facility]

## 2022-01-31 NOTE — PROGRESS NOTE ADULT - ATTENDING COMMENTS
No complaints.  Moving bowels.  Oral intake is questionable at best.  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  A-fib controlled.  RLL PNA treated.  Main issue is malnutrition.  1. Encouraged po intake.  Continue TPN for now, but this is not a good long-term solution  2. PT for OOB.  3. On Xarelto  4. Will discuss case at Tumor board and then with daughter.  She is not nutritionally sound enough to undergo an operation. No complaints.  Moving bowels.  Oral intake is questionable at best.  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  A-fib controlled.  RLL PNA treated.  Urinary retention.  Main issue is malnutrition.  1. Encouraged po intake.  Continue Megace.  Continue TPN for now, but this is not a good long-term solution.    2. PT for OOB.  3. On Xarelto  4. Trial of void.  5. Will discuss case at Tumor board and then with daughter.  She is not nutritionally sound enough to undergo an operation. No complaints.  Moving bowels.  Oral intake is questionable at best.  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  A-fib controlled.  RLL PNA treated.  Urinary retention.  Main issue is malnutrition.  1. Encouraged po intake.  Continue Megace.  Continue TPN for now, but this is not a good long-term solution.    2. PT for OOB.  3. On Xarelto  4. Trial of void.  5. Will discuss case at Tumor board and then with daughter.  She is not nutritionally sound enough to undergo an operation.  6. Awaiting MRI of spine.

## 2022-01-31 NOTE — PROGRESS NOTE ADULT - SUBJECTIVE AND OBJECTIVE BOX
[facility] and Palliative Care  Keith Dunbar, Palliative Care Attending  Contact Info: Call [phone] ([facility]) or message on [employer] Teams (Keith Dunbar)    SUBJECTIVE AND OBJECTIVE:  INTERVAL HPI/OVERNIGHT EVENTS: No significant nterval events noted. Patient required no additional PRNs in past 24hrs. Still with poor PO intake, ate some of her banana this morning. Patient denies pain, SOB, or nausea. Pleasant but confused. Discussions with daughter to coordinate a meeting for tomorrow. Discussed with primary team.    ALLERGIES:  No Known Allergies    MEDICATIONS  (STANDING):  aMIOdarone    Tablet 200 milliGRAM(s) Oral daily  chlorhexidine 2% Cloths 1 Application(s) Topical <User Schedule>  fat emulsion (Fish Oil and Plant Based) 20% Infusion 1.182 Gm/kG/Day (20.8 mL/Hr) IV Continuous <Continuous>  latanoprost 0.005% Ophthalmic Solution 1 Drop(s) Both EYES at bedtime  megestrol Suspension 800 milliGRAM(s) Oral every 24 hours  metoprolol succinate ER 50 milliGRAM(s) Oral daily  OLANZapine 5 milliGRAM(s) Oral at bedtime  Parenteral Nutrition - Adult 1 Each (25 mL/Hr) TPN Continuous <Continuous>  Parenteral Nutrition - Adult 1 Each (25 mL/Hr) TPN Continuous <Continuous>  rivaroxaban 20 milliGRAM(s) Oral with dinner    MEDICATIONS  (PRN):  sodium chloride 0.9% lock flush 10 milliLiter(s) IV Push every 1 hour PRN Pre/post blood products, medications, blood draw, and to maintain line patency    ITEMS UNCHECKED ARE NOT PRESENT  PRESENT SYMPTOMS/REVIEW OF SYSTEMS: []Unable to obtain due to poor mentation   Source if other than patient:  []Family   []Team     Pain: []yes [x]no  QOL impact -   Location -       Aggravating factors -  Quality -   Radiation -  Timing -   Severity (0-10 scale):  Minimal acceptable level (0-10 scale):     Dyspnea:                           [ ]Mild [ ]Moderate [ ]Severe  Anxiety:                             [ ]Mild [ ]Moderate [ ]Severe  Fatigue:                             [x]Mild [ ]Moderate [ ]Severe  Nausea:                             []Mild [ ]Moderate [ ]Severe  Loss of appetite:              [ ]Mild [ ]Moderate [x]Severe  Constipation:                    [ ]Mild [ ]Moderate [ ]Severe    Other Symptoms:  [x]All other review of systems negative     Palliative Performance Status Version 2:        30%       GENERAL:  [ ]Alert  [x]Oriented x1   [x]Lethargic  [ ]Cachexia  [ ]Unarousable  [x]Verbal  [ ]Non-Verbal  Behavioral:   [ ] Anxiety  [x] Delirium [ ] Agitation [x] Cooperative  HEENT:  [ ]Normal   [x]Dry mouth   [ ]ET Tube/Trach  [ ]Oral lesions  PULMONARY:   [x]Clear [ ]Tachypnea  [ ]Audible excessive secretions   [ ]Rhonchi        [ ]Right [ ]Left [ ]Bilateral  [ ]Crackles        [ ]Right [ ]Left [ ]Bilateral  [ ]Wheezing     [ ]Right [ ]Left [ ]Bilateral  [ ]Diminished breath sounds [ ]right [ ]left [ ]bilateral  CARDIOVASCULAR:    [x]Regular [ ]Irregular [ ]Tachy  [ ]Kai [ ]Murmur [ ]Other  GASTROINTESTINAL:  [x]Soft  [x]Distended   [ ]+BS  [x]Non tender [ ]Tender  [ ]PEG [ ]OGT/ NGT  Last BM: 1/30   GENITOURINARY:  [x]Normal [ ] Incontinent   [ ]Oliguria/Anuria   [ ]Reyes  MUSCULOSKELETAL:   [ ]Normal   [x]Weakness  [x]Bed/Wheelchair bound [ ]Edema  NEUROLOGIC:   [ ]No focal deficits  [x]Cognitive impairment  [ ]Dysphagia [ ]Dysarthria [ ]Paresis [ ]Other   SKIN:   [x]Normal    [ ]Rash  [ ]Pressure ulcer(s)    Vital Signs Last 24 Hrs  T(C): 36.8 (31 Jan 2022 14:23), Max: 36.8 (31 Jan 2022 14:23)  T(F): 98.3 (31 Jan 2022 14:23), Max: 98.3 (31 Jan 2022 14:23)  HR: 84 (31 Jan 2022 14:23) (84 - 88)  BP: 102/51 (31 Jan 2022 14:23) (93/50 - 128/58)  BP(mean): --  RR: 20 (31 Jan 2022 14:23) (16 - 20)  SpO2: 97% (31 Jan 2022 14:23) (97% - 100%)    LABS:                         7.4    5.88  )-----------( 142      ( 31 Jan 2022 07:02 )             24.1   01-31    144  |  109<H>  |  23  ----------------------------<  120<H>  4.3   |  25  |  0.64    Ca    8.2<L>      31 Jan 2022 07:02  Phos  4.2     01-31  Mg     2.3     01-31  TPro  x   /  Alb  2.5<L>  /  TBili  x   /  DBili  x   /  AST  x   /  ALT  x   /  AlkPhos  x   01-30    RADIOLOGY & ADDITIONAL STUDIES: None new    PROTEIN CALORIE MALNUTRITION PRESENT: [ ]mild [ ]moderate [x]severe [ ]underweight [ ]morbid obesity  [x] PPSV2 < or = 30% [x] significant weight loss [x] poor nutritional intake [] anasarca [] catabolic state    Artificial Nutrition []     DISCUSSION OF CASE: Daughter (Blanca) - to provide updates and emotional support, set up family meeting    Goals of Care Document:   Care Coordination Document:   PROGRESS NOTE  Date & Time of Note   2022-01-31 05:20  Notes: As per Dr. Pérez no further surgery being offered and patient's  appetite and overall condition not improving. Palliative SW spoke with ciera Rios and family meeting is scheduled for Tuesday 2/1/22 at 12:00 with both  daughters Rosario and Dr. Dunbar. palliative attending. Support was  provided. Hospice benefit information was provided. Ciera Rios stated they  would like to start home hospice referral with [facility], referral was sent.  Patient experience aware of visitation exception for both daughters. Palliative  team will continue to follow, assist with symptom management and safe discharge  plan and collaborate with interdisciplinary team.  Electronically signed by:  Pancho Peck  Electronically signed on:  2022-01-31  17:25

## 2022-01-31 NOTE — PROGRESS NOTE ADULT - PROBLEM SELECTOR PLAN 6
.  FAST 7C; bedbound and malnutrition  -poor prognostic factor with comorbid malignancy  -will meet criteria for home hospice referral given bedbound status, malnutrition, hypoalbuminemia (Alb = 2.5) with untreated locally advanced malignancy as supporting evidence for limited life expectancy

## 2022-01-31 NOTE — PROGRESS NOTE ADULT - PROBLEM SELECTOR PLAN 5
.  s/p colonic stent with resolution of obstruction  -no evidence of metastatic disease on imaging  -surgical resection could be curative but is not making progress towards being optimized for surgery

## 2022-01-31 NOTE — PROGRESS NOTE ADULT - ASSESSMENT
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     #AMS/worsening dementia  Dementia workup normal  Suspect 2/2 worsened chronic state  Needs improved nutrition status.   Palliative care following  Going for MRI spine today      # persistent tachycardia  Resolved/improving  echo unremarkable  Unlikely sepsis    #Hypotension  This has improved    # Rectosigmoid cancer   s/p Flex Sig (1/21/22); obstructive mass seen in the rectosigmoid at ~10 cm s/p placement of uncovered 24 x 120 mm stent  consider [facility] consult  Palliative care-May be surgical candidate if nutritionally optimized    # Chronic atrial fibrillation   on Xarelto + metoprolol as outpatient  - on amiodarone 200mg qd + metoprolol succinate 25mg qd + xarelto    # Metabolic encephalopathy   get collateral info regarding baseline  CT head neg on admission  avoid benzos/anticholinergics/restraints    # Urinary retention  Reyes replaced on 1.27.22 - Drained >1L upon placement   TOV per primary team;   encourage patient to attempt urination on commode--some retention may be positional     # BMI <19  BMI: BMI (kg/m2): 16.5 (01-22-22 @ 05:00)  Affects all aspects of care.  Dose medications by weight   On TPN     # Hypokalemia - repleted  Potassium, Serum: 4.0 mmol/L (01-27-22 @ 06:08)  # Hypophosphatemia - repleted  Phosphorus Level, Serum: 3.4 mg/dL (01-27-22 @ 06:08)    # vertebral compression fracture   CT abd from this admission showing ".  Severe L2 vertebral body compression deformity and retropulsed   posterior fragment moderately narrowing the central canal at this level."  [ ] back brace per NSGY consult . MRI pending  defer recs re: further imaging and management to NSGY consult.      # decreased po intake  consider NG feeds. Pt is not candidate for peg due to poor prognosis    #functional quadriplegia  PPS<30%  Palliative care on board to discuss GOC

## 2022-01-31 NOTE — PROGRESS NOTE ADULT - SUBJECTIVE AND OBJECTIVE BOX
Subjective: seen bedside. no acute complaints. no overnight events. Still with minimal PO intake    Vital Signs Last 24 Hrs  VS stable    PHYSICAL EXAM:  GENERAL: NAD, well-groomed, well-developed  HEAD:  Atraumatic, Normocephalic  EYES: EOMI,   NECK: Supple, No JVD,   CHEST/LUNG: Clear to percussion bilaterally  HEART: Regular rate and rhythm  ABDOMEN: Soft, Nontender, Nondistended; Bowel sounds present  EXTREMITIES:  No edema  SKIN: No rashes or lesions    LABS:  reviewed. Hgb with slight drop today.       pending mri spine

## 2022-02-01 LAB
ANION GAP SERPL CALC-SCNC: 7 MMOL/L — SIGNIFICANT CHANGE UP (ref 5–17)
BUN SERPL-MCNC: 16 MG/DL — SIGNIFICANT CHANGE UP (ref 7–23)
CALCIUM SERPL-MCNC: 8.7 MG/DL — SIGNIFICANT CHANGE UP (ref 8.4–10.5)
CHLORIDE SERPL-SCNC: 107 MMOL/L — SIGNIFICANT CHANGE UP (ref 96–108)
CO2 SERPL-SCNC: 26 MMOL/L — SIGNIFICANT CHANGE UP (ref 22–31)
CREAT SERPL-MCNC: 0.71 MG/DL — SIGNIFICANT CHANGE UP (ref 0.5–1.3)
GLUCOSE BLDC GLUCOMTR-MCNC: 130 MG/DL — HIGH (ref 70–99)
GLUCOSE BLDC GLUCOMTR-MCNC: 135 MG/DL — HIGH (ref 70–99)
GLUCOSE SERPL-MCNC: 121 MG/DL — HIGH (ref 70–99)
HCT VFR BLD CALC: 23.8 % — LOW (ref 34.5–45)
HGB BLD-MCNC: 7.2 G/DL — LOW (ref 11.5–15.5)
MAGNESIUM SERPL-MCNC: 2.3 MG/DL — SIGNIFICANT CHANGE UP (ref 1.6–2.6)
MCHC RBC-ENTMCNC: 27.5 PG — SIGNIFICANT CHANGE UP (ref 27–34)
MCHC RBC-ENTMCNC: 30.3 GM/DL — LOW (ref 32–36)
MCV RBC AUTO: 90.8 FL — SIGNIFICANT CHANGE UP (ref 80–100)
NRBC # BLD: 0 /100 WBCS — SIGNIFICANT CHANGE UP (ref 0–0)
PHOSPHATE SERPL-MCNC: 4.1 MG/DL — SIGNIFICANT CHANGE UP (ref 2.5–4.5)
PLATELET # BLD AUTO: 173 K/UL — SIGNIFICANT CHANGE UP (ref 150–400)
POTASSIUM SERPL-MCNC: 4.3 MMOL/L — SIGNIFICANT CHANGE UP (ref 3.5–5.3)
POTASSIUM SERPL-SCNC: 4.3 MMOL/L — SIGNIFICANT CHANGE UP (ref 3.5–5.3)
RBC # BLD: 2.62 M/UL — LOW (ref 3.8–5.2)
RBC # FLD: 15.7 % — HIGH (ref 10.3–14.5)
SODIUM SERPL-SCNC: 140 MMOL/L — SIGNIFICANT CHANGE UP (ref 135–145)
WBC # BLD: 4.98 K/UL — SIGNIFICANT CHANGE UP (ref 3.8–10.5)
WBC # FLD AUTO: 4.98 K/UL — SIGNIFICANT CHANGE UP (ref 3.8–10.5)

## 2022-02-01 PROCEDURE — 99497 ADVNCD CARE PLAN 30 MIN: CPT | Mod: 25

## 2022-02-01 PROCEDURE — 99221 1ST HOSP IP/OBS SF/LOW 40: CPT

## 2022-02-01 PROCEDURE — 99233 SBSQ HOSP IP/OBS HIGH 50: CPT

## 2022-02-01 PROCEDURE — 99231 SBSQ HOSP IP/OBS SF/LOW 25: CPT

## 2022-02-01 RX ORDER — RIVAROXABAN 15 MG-20MG
20 KIT ORAL ONCE
Refills: 0 | Status: COMPLETED | OUTPATIENT
Start: 2022-02-01 | End: 2022-02-01

## 2022-02-01 RX ORDER — I.V. FAT EMULSION 20 G/100ML
1.18 EMULSION INTRAVENOUS
Qty: 50 | Refills: 0 | Status: DISCONTINUED | OUTPATIENT
Start: 2022-02-01 | End: 2022-02-01

## 2022-02-01 RX ORDER — ELECTROLYTE SOLUTION,INJ
1 VIAL (ML) INTRAVENOUS
Refills: 0 | Status: DISCONTINUED | OUTPATIENT
Start: 2022-02-01 | End: 2022-02-01

## 2022-02-01 RX ADMIN — AMIODARONE HYDROCHLORIDE 200 MILLIGRAM(S): 400 TABLET ORAL at 06:25

## 2022-02-01 RX ADMIN — I.V. FAT EMULSION 20.83 GM/KG/DAY: 20 EMULSION INTRAVENOUS at 22:08

## 2022-02-01 RX ADMIN — Medication 1 EACH: at 17:43

## 2022-02-01 RX ADMIN — CHLORHEXIDINE GLUCONATE 1 APPLICATION(S): 213 SOLUTION TOPICAL at 06:26

## 2022-02-01 RX ADMIN — OLANZAPINE 5 MILLIGRAM(S): 15 TABLET, FILM COATED ORAL at 22:19

## 2022-02-01 RX ADMIN — RIVAROXABAN 20 MILLIGRAM(S): KIT at 23:19

## 2022-02-01 RX ADMIN — Medication 50 MILLIGRAM(S): at 06:25

## 2022-02-01 NOTE — PROGRESS NOTE ADULT - SUBJECTIVE AND OBJECTIVE BOX
STATUS POST:  Flex sigmoidoscopy: obstructive in the rectosigmoid 10 cm from anal verge; stent placed on 1/21    ON: eating more for dinner - a whole donut, MRI: compression fracture of the L2 vertebral body with a loss of height between 20 and 40% and retropulsion of posterior cortex by 5 mm, mild central canal stenosis, Chronic compression deformities of T12, L3, and L5. There is no other acute traumatic injury of the lumbar spine.     SUBJECTIVE: Patient seen and examined bedside by chief resident. Patient with no acute complaints. No abdominal pain, CP, SOB, or n/v.    aMIOdarone    Tablet 200 milliGRAM(s) Oral daily  metoprolol succinate ER 50 milliGRAM(s) Oral daily  rivaroxaban 20 milliGRAM(s) Oral with dinner      Vital Signs Last 24 Hrs  T(C): 36.8 (01 Feb 2022 05:32), Max: 37 (31 Jan 2022 17:52)  T(F): 98.3 (01 Feb 2022 05:32), Max: 98.6 (31 Jan 2022 17:52)  HR: 99 (01 Feb 2022 05:32) (84 - 99)  BP: 126/61 (01 Feb 2022 05:32) (101/50 - 126/61)  BP(mean): 88 (01 Feb 2022 05:32) (71 - 88)  RR: 19 (01 Feb 2022 05:32) (18 - 21)  SpO2: 98% (01 Feb 2022 05:32) (97% - 100%)  I&O's Detail    30 Jan 2022 07:01  -  31 Jan 2022 07:00  --------------------------------------------------------  IN:    Fat Emulsion (Fish Oil &amp; Plant Based) 20% Infusion: 208 mL    TPN (Total Parenteral Nutrition): 300 mL  Total IN: 508 mL    OUT:    Indwelling Catheter - Urethral (mL): 1800 mL    Voided (mL): 350 mL  Total OUT: 2150 mL    Total NET: -1642 mL      31 Jan 2022 07:01  -  01 Feb 2022 06:55  --------------------------------------------------------  IN:    Fat Emulsion (Fish Oil &amp; Plant Based) 20% Infusion: 62.4 mL    Fat Emulsion (Fish Oil &amp; Plant Based) 20% Infusion: 187.2 mL    Oral Fluid: 320 mL    TPN (Total Parenteral Nutrition): 525 mL  Total IN: 1094.6 mL    OUT:    Indwelling Catheter - Urethral (mL): 1650 mL    Stool (mL): 1 mL  Total OUT: 1651 mL    Total NET: -556.4 mL          Physical Exam:  General: NAD, resting comfortably in bed  C/V: NSR  Pulm: Nonlabored breathing, no respiratory distress on room air  Abd: soft, ND, NT, no rebound tenderness, no guarding  Extrem: WWP, no edema, SCDs in place    LABS:                        7.4    5.88  )-----------( 142      ( 31 Jan 2022 07:02 )             24.1     01-31    144  |  109<H>  |  23  ----------------------------<  120<H>  4.3   |  25  |  0.64    Ca    8.2<L>      31 Jan 2022 07:02  Phos  4.2     01-31  Mg     2.3     01-31            RADIOLOGY & ADDITIONAL STUDIES:    86 yo female PMH of dementia, Afib, PPM (Indicated for tachy-yudith syndrome), cerebral LICA paraclinoid aneurysm (s/p stenting) presented with LBO w/ obstructing rectal mass now s/p rectal stent (1/21). Course c/b a-fib with RVR transferred to SICU s/p amio drip now stable and stepped down to tele. LE Duplex (1/30) no DVT. Patient is doing well and MRI was obtained overnight. Will f/u on neurosurgery and palliative care recs    LRD, PICC/TPN   Home meds as appropriate  Ceftriaxone(1/21-1/26)  Pain/nausea PRN  Amio/Metoprolol for AFib  Xarelto  Megace appetite stimulant  ISS  AM labs  Dispo: [facility]    STATUS POST:  Flex sigmoidoscopy: obstructive in the rectosigmoid 10 cm from anal verge; stent placed on 1/21    ON: eating more for dinner - a whole donut, MRI: compression fracture of the L2 vertebral body with a loss of height between 20 and 40% and retropulsion of posterior cortex by 5 mm, mild central canal stenosis, Chronic compression deformities of T12, L3, and L5. There is no other acute traumatic injury of the lumbar spine.     SUBJECTIVE: Patient seen and examined bedside by chief resident. Patient with no acute complaints. No abdominal pain, CP, SOB, or n/v.    aMIOdarone    Tablet 200 milliGRAM(s) Oral daily  metoprolol succinate ER 50 milliGRAM(s) Oral daily  rivaroxaban 20 milliGRAM(s) Oral with dinner      Vital Signs Last 24 Hrs  T(C): 36.8 (01 Feb 2022 05:32), Max: 37 (31 Jan 2022 17:52)  T(F): 98.3 (01 Feb 2022 05:32), Max: 98.6 (31 Jan 2022 17:52)  HR: 99 (01 Feb 2022 05:32) (84 - 99)  BP: 126/61 (01 Feb 2022 05:32) (101/50 - 126/61)  BP(mean): 88 (01 Feb 2022 05:32) (71 - 88)  RR: 19 (01 Feb 2022 05:32) (18 - 21)  SpO2: 98% (01 Feb 2022 05:32) (97% - 100%)  I&O's Detail    30 Jan 2022 07:01  -  31 Jan 2022 07:00  --------------------------------------------------------  IN:    Fat Emulsion (Fish Oil &amp; Plant Based) 20% Infusion: 208 mL    TPN (Total Parenteral Nutrition): 300 mL  Total IN: 508 mL    OUT:    Indwelling Catheter - Urethral (mL): 1800 mL    Voided (mL): 350 mL  Total OUT: 2150 mL    Total NET: -1642 mL      31 Jan 2022 07:01  -  01 Feb 2022 06:55  --------------------------------------------------------  IN:    Fat Emulsion (Fish Oil &amp; Plant Based) 20% Infusion: 62.4 mL    Fat Emulsion (Fish Oil &amp; Plant Based) 20% Infusion: 187.2 mL    Oral Fluid: 320 mL    TPN (Total Parenteral Nutrition): 525 mL  Total IN: 1094.6 mL    OUT:    Indwelling Catheter - Urethral (mL): 1650 mL    Stool (mL): 1 mL  Total OUT: 1651 mL    Total NET: -556.4 mL          Physical Exam:  General: NAD, resting comfortably in bed  C/V: NSR  Pulm: Nonlabored breathing, no respiratory distress on room air  Abd: soft, ND, NT, no rebound tenderness, no guarding  Extrem: WWP, no edema, SCDs in place    LABS:                        7.4    5.88  )-----------( 142      ( 31 Jan 2022 07:02 )             24.1     01-31    144  |  109<H>  |  23  ----------------------------<  120<H>  4.3   |  25  |  0.64    Ca    8.2<L>      31 Jan 2022 07:02  Phos  4.2     01-31  Mg     2.3     01-31            RADIOLOGY & ADDITIONAL STUDIES:    86 yo female PMH of dementia, Afib, PPM (Indicated for tachy-yudith syndrome), cerebral LICA paraclinoid aneurysm (s/p stenting) presented with LBO w/ obstructing rectal mass now s/p rectal stent (1/21). Course c/b a-fib with RVR transferred to SICU s/p amio drip now stable and stepped down to tele. LE Duplex (1/30) no DVT. Patient is doing well and MRI was obtained overnight. Will f/u on neurosurgery and palliative care recs    LRD, PICC/TPN   Home meds as appropriate  Ceftriaxone(1/21-1/26)  Pain/nausea PRN  Amio/Metoprolol for AFib  Xarelto  Megace appetite stimulant  ISS  AM labs  Dispo: family meeting today with palliative

## 2022-02-01 NOTE — PROGRESS NOTE ADULT - CONVERSATION DETAILS
Reviewed patient's hospital course and the initial plan of care to optimize patient for surgery. Explained that despite TPN and appetite stimulants, patient was not showing the progress we would have hoped for. Dr. Pérez discussed recent tumor board discussion and shared that the general consensus was that surgery was high risk and that pursuing a conservative/palliative plan of care was recommended. Daughters understood the rationale and concern, they were in agreement that patient is not progressing as expected and agree surgery is unlikely to be in the patient's best interest. At this time, patient's life expectancy is limited by dementia/malnutrition rather than colon cancer. Explained that since there is no evidence of metastatic colon cancer, so the patient's malnutrition and dementia is the diagnosis that makes the patient eligible for hospice. Patient and daughters are in agreement that patient will be best suited to return home rather than [facility] and they are amenable to home hospice referral.

## 2022-02-01 NOTE — PROGRESS NOTE ADULT - PROBLEM SELECTOR PLAN 7
.  Patient is Full Code  -daughter (Blanca) is HCP, [phone]  -plan for family meeting tomorrow, home hospice referral has already been broached .  Patient is Full Code  -daughter (Blanca) is [facility], [phone]  -will address code status tomorrow but hospice can address in the community if necessary    In addition to the E/M visit, an advance care planning meeting was performed. Start time: 12:24PM; End time: 12:42PM; Total time: 18min. A face to face meeting to discuss advance care planning was held today regarding: BRANDEN ULLOA. Primary decision maker: Patient is unable to participate in decision making; Alternate/surrogate: Blanca Ulloa. Discussed advance directives including, but not limited to, healthcare proxy and code status. Decision regarding code status: FULL CODE; Documentation completed today: Hospice Referral [facility] note

## 2022-02-01 NOTE — PROGRESS NOTE ADULT - PROBLEM SELECTOR PLAN 1
.  No significant improvement despite Megace and Zyprexa  -Zyprexa has synergistic effect with Megace for appetite stimulation/weight gain especially in cancer-related anorexia .  No significant improvement despite Megace and Zyprexa, will continue for now  -Zyprexa has synergistic effect with Megace for appetite stimulation/weight gain especially in cancer-related anorexia

## 2022-02-01 NOTE — PROGRESS NOTE ADULT - PROBLEM SELECTOR PLAN 8
.  Complex medical decision making / symptom management in the setting of advanced illness.    Will continue to follow for ongoing GOC discussion / titration of palliative regimen.   Emotional support provided, questions answered.  Active Psychosocial Referrals:  [x]Social Work/Case management [x]PT/OT []Chaplaincy []Hospice  [x]Patient/Family Support []Holistic RN []Massage Therapy []Ethics  Coping: [x] well [] with difficulty [] poor coping [] unable to assess  Support system: [] strong [x] adequate [] inadequate    For new or uncontrolled symptoms, please call Palliative Care at [phone]. The service is available 24/7 (including nights & weekends) to provide symptom management recommendations over the phone as appropriate .  Complex medical decision making / symptom management in the setting of advanced illness.    Will continue to follow for ongoing GOC discussion. Emotional support provided, questions answered.  Active Psychosocial Referrals:  [x]Social Work/Case management [x]PT/OT []Chaplaincy [x]Hospice  [x]Patient/Family Support []Holistic RN []Massage Therapy []Ethics  Coping: [x] well [] with difficulty [] poor coping [] unable to assess  Support system: [] strong [x] adequate [] inadequate    For new or uncontrolled symptoms, please call Palliative Care at [phone]. The service is available 24/7 (including nights & weekends) to provide symptom management recommendations over the phone as appropriate
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
## 2022-02-01 NOTE — PROGRESS NOTE ADULT - PROBLEM SELECTOR PLAN 6
.  FAST 7C; bedbound and malnutrition  -poor prognostic factor with comorbid malignancy  -will meet criteria for home hospice referral given bedbound status, malnutrition, hypoalbuminemia (Alb = 2.5) with untreated locally advanced malignancy as supporting evidence for limited life expectancy .  FAST 7C; bedbound and malnutrition  -poor prognostic factor with comorbid malignancy  -meets criteria for home hospice with diagnosis of dementia given bedbound status, malnutrition, and hypoalbuminemia (Alb = 2.5); untreated locally advanced malignancy is supporting evidence for limited life expectancy

## 2022-02-01 NOTE — CHART NOTE - NSCHARTNOTEFT_GEN_A_CORE
Admitting Diagnosis:   Patient is a 87y old  Female who presents with a chief complaint of Weakness (01 Feb 2022 10:55)    PAST MEDICAL & SURGICAL HISTORY:  Glaucoma  Osteoarthritis  Syncope and collapse  PVC (premature ventricular contraction)  Cerebral aneurysm  History of loop recorder  History of cholecystectomy      Current Nutrition Order:  Diet, Low Fiber:   Supplement Feeding Modality:  Oral  Ensure Max Cans or Servings Per Day:  1       Frequency:  Three Times a day (01-30-22 @ 06:09)    PO Intake: Good (%) [   ]  Fair (50-75%) [   ] Poor (<25%) [  x ]  -Spoke with pt on 1/31, however, poor historian. Pt states appetite is good, but per calorie count pt meeting <25% of EER via po intake. Discussed calorie count results with Team 4 1/31 and adjusted supplements to help improve po intake. Also, pt started on Megace 1/28 to stimulate appetite.     Parenteral Nutrition:  Route- PICC  Regimen- 35g AA, 150g dextrose, 50g SMOF lipids @ 25ml/hr  Provides- 1150 kcals, 35g protein, 600ml total volume per day. Meets ~80% energy and 55-60% protein needs.    GI Issues: +2 BMs (1/30), liquids stools have decreased  Denies N/V  Pain: Denies   Skin Integrity: Bruised, incontinence   -No pressure breakdown   Jules Score: 13  Edema: 1+ edema Rt and Lt leg    Labs: POCT glucose x48hrs: 102-148  02-01    140  |  107  |  16  ----------------------------<  121<H>  4.3   |  26  |  0.71    Ca    8.7      01 Feb 2022 07:54  Phos  4.1     02-01  Mg     2.3     02-01    POCT Blood Glucose.: 130 mg/dL (01 Feb 2022 06:19)  POCT Blood Glucose.: 135 mg/dL (01 Feb 2022 00:11)    Medications:  MEDICATIONS  (STANDING):  aMIOdarone    Tablet 200 milliGRAM(s) Oral daily  chlorhexidine 2% Cloths 1 Application(s) Topical <User Schedule>  fat emulsion (Fish Oil and Plant Based) 20% Infusion 1.182 Gm/kG/Day (20.8 mL/Hr) IV Continuous <Continuous>  fat emulsion (Fish Oil and Plant Based) 20% Infusion 1.182 Gm/kG/Day (20.83 mL/Hr) IV Continuous <Continuous>  latanoprost 0.005% Ophthalmic Solution 1 Drop(s) Both EYES at bedtime  megestrol Suspension 800 milliGRAM(s) Oral every 24 hours  metoprolol succinate ER 50 milliGRAM(s) Oral daily  OLANZapine 5 milliGRAM(s) Oral at bedtime  Parenteral Nutrition - Adult 1 Each (25 mL/Hr) TPN Continuous <Continuous>  Parenteral Nutrition - Adult 1 Each (25 mL/Hr) TPN Continuous <Continuous>  rivaroxaban 20 milliGRAM(s) Oral with dinner    MEDICATIONS  (PRN):  OLANZapine Injectable 2.5 milliGRAM(s) IntraMuscular once PRN agitation  sodium chloride 0.9% lock flush 10 milliLiter(s) IV Push every 1 hour PRN Pre/post blood products, medications, blood draw, and to maintain line patency    Anthropometrics:  Ht: 160cm (63")  Wt: 42.3kg (1/22)-Admit        54.2kg (1/24)-Bedscale    IBW:  115# (52kg)   % IBW: 81%    Weight Change: One weight since admission, noted 11.9kg wt increase which is not accurate. Suspect bedscale weight inaccurate.     Nutrition Focused Physical Exam: Completed [ x  ]  Not Pertinent [   ]  -Severe malnutrition in the context of acute illness, diagnosed 1/22    Estimated energy needs: 42.3kg  ABW for estimated needs per clinical judgement, adjusted for malnutrition, acute illness, malignancy, age   Calories: 4957-7380 kcals (30-35kcals/kg)  Protein: 55-70g (1.3-1.6g/kg)  Fluid: 1250-1500ml (30-35ml/kg)    Subjective:   86yo female with afib (on Xarelto) dementia presents with weakness and poor PO intake. Exam significant for distention, CT with evidence of large bowel obstruction, narrowing of the rectum 9-12cm from the anal verge, differential includes malignancy vs infectious/inflammatory. S/P flex sig 1/21/22 revealed obstructive mass lesion seen in rectosigmoid at ~10cm now s/p stent placement. Planed for colonoscopy today (1/27) after bowel prep, however pt did not complete bowel prep with Golytely. Spoke with team 4 via phone, no plan to procedure with colonoscopy today and plan to start process for CALE placement. Discussed long-term nutrition plan for pt. Pt with functional GI tract and no obstruction since stent placement.   *Palliative care discuss with family today to determine goals of care.     Per RD note on 1/31:  Calorie count ordered from 1/28-1/30 and due for assessment today (1/31). Collect sheets from 1/28 and 1/30, no sheet for 1/29. Please see breakdown below. Overall, calorie count reveals pt meeting <25% of nutrient needs via po intake. Started on Megace 1/28 and TPN decreased to 35g AA, 150g dextrose, 50g SMOF to start weaning since pt with functional GI tract and no plan for TPN at d/c. Palliative follow for goals of care discussion. Spoke with Team 4 via phone today to discuss nutrition plan. Reviewed calorie count results with team. Team states goal is for pt to d/c to CALE once po intake increases. Discussed changing up supplements to offer a few different options and MD agreeable. Also, pt not appropriate for EN d/t hx of dementia and will not improve overall quality of life. Will continue to monitor and follow per protocol.     Previous Nutrition Diagnosis: Malnutrition R/T inadequate energy intake i/s/o declining mental and physical conditions AEB muscle/fat/ wt loss, poor po intake     Active [ x  ]  Resolved [   ]    If resolved, new PES:     Goal:  Pt will meet >75% of daily EER via tolerated route    Recommendations:  1. Low fiber diet  -Encouraged po intake  -Adjusted supplements to Ensure Enlive BID and Ensure Pudding once daily. Pt does not like Ensure Max  2. Long-term TPN not recommend d/t functional GI tract, however, Team 4 states plans to continue to meet half nutrient needs until po intake increases. Long-term nutrition support (PN or EN) is not recommended d/t dementia and will not improve overall quality of life  -35g AA, 150g dextrose, 50g SMOF lipids  3. Continue Megace daily  4. Monitor labs  5. Bowel meds per MD    Education: Pt with dementia, no appropriate for education     Risk Level: High [ x ] Moderate [   ] Low [   ]  Macrina Gilbert RD,CDN,,CNSC. Admitting Diagnosis:   Patient is a 87y old  Female who presents with a chief complaint of Weakness (01 Feb 2022 10:55)    PAST MEDICAL & SURGICAL HISTORY:  Glaucoma  Osteoarthritis  Syncope and collapse  PVC (premature ventricular contraction)  Cerebral aneurysm  History of loop recorder  History of cholecystectomy      Current Nutrition Order:  Diet, Low Fiber:   Supplement Feeding Modality:  Oral  Ensure Max Cans or Servings Per Day:  1       Frequency:  Three Times a day (01-30-22 @ 06:09)    PO Intake: Good (%) [   ]  Fair (50-75%) [   ] Poor (<25%) [  x ]  -Spoke with pt on 1/31, however, poor historian. Pt states appetite is good, but per calorie count pt meeting <25% of EER via po intake. Discussed calorie count results with Team 4 1/31 and adjusted supplements to help improve po intake. Also, pt started on Megace 1/28 to stimulate appetite.     Parenteral Nutrition:  Route- PICC  Regimen- 35g AA, 150g dextrose, 50g SMOF lipids @ 25ml/hr  Provides- 1150 kcals, 35g protein, 600ml total volume per day. Meets ~80% energy and 55-60% protein needs.    GI Issues: +2 BMs (1/30), liquids stools have decreased  Denies N/V  Pain: Denies   Skin Integrity: Bruised, incontinence   -No pressure breakdown   Jules Score: 13  Edema: 1+ edema Rt and Lt leg    Labs: POCT glucose x48hrs: 102-148  02-01    140  |  107  |  16  ----------------------------<  121<H>  4.3   |  26  |  0.71    Ca    8.7      01 Feb 2022 07:54  Phos  4.1     02-01  Mg     2.3     02-01    POCT Blood Glucose.: 130 mg/dL (01 Feb 2022 06:19)  POCT Blood Glucose.: 135 mg/dL (01 Feb 2022 00:11)    Medications:  MEDICATIONS  (STANDING):  aMIOdarone    Tablet 200 milliGRAM(s) Oral daily  chlorhexidine 2% Cloths 1 Application(s) Topical <User Schedule>  fat emulsion (Fish Oil and Plant Based) 20% Infusion 1.182 Gm/kG/Day (20.8 mL/Hr) IV Continuous <Continuous>  fat emulsion (Fish Oil and Plant Based) 20% Infusion 1.182 Gm/kG/Day (20.83 mL/Hr) IV Continuous <Continuous>  latanoprost 0.005% Ophthalmic Solution 1 Drop(s) Both EYES at bedtime  megestrol Suspension 800 milliGRAM(s) Oral every 24 hours  metoprolol succinate ER 50 milliGRAM(s) Oral daily  OLANZapine 5 milliGRAM(s) Oral at bedtime  Parenteral Nutrition - Adult 1 Each (25 mL/Hr) TPN Continuous <Continuous>  Parenteral Nutrition - Adult 1 Each (25 mL/Hr) TPN Continuous <Continuous>  rivaroxaban 20 milliGRAM(s) Oral with dinner    MEDICATIONS  (PRN):  OLANZapine Injectable 2.5 milliGRAM(s) IntraMuscular once PRN agitation  sodium chloride 0.9% lock flush 10 milliLiter(s) IV Push every 1 hour PRN Pre/post blood products, medications, blood draw, and to maintain line patency    Anthropometrics:  Ht: 160cm (63")  Wt: 42.3kg (1/22)-Admit        54.2kg (1/24)-Bedscale    IBW:  115# (52kg)   % IBW: 81%    Weight Change: One weight since admission, noted 11.9kg wt increase which is not accurate. Suspect bedscale weight inaccurate.     Nutrition Focused Physical Exam: Completed [ x  ]  Not Pertinent [   ]  -Severe malnutrition in the context of acute illness, diagnosed 1/22    Estimated energy needs: 42.3kg  ABW for estimated needs per clinical judgement, adjusted for malnutrition, acute illness, malignancy, age   Calories: 2900-1905 kcals (30-35kcals/kg)  Protein: 55-70g (1.3-1.6g/kg)  Fluid: 1250-1500ml (30-35ml/kg)    Subjective:   88yo female with afib (on Xarelto) dementia presents with weakness and poor PO intake. Exam significant for distention, CT with evidence of large bowel obstruction, narrowing of the rectum 9-12cm from the anal verge, differential includes malignancy vs infectious/inflammatory. S/P flex sig 1/21/22 revealed obstructive mass lesion seen in rectosigmoid at ~10cm now s/p stent placement. Planed for colonoscopy today (1/27) after bowel prep, however pt did not complete bowel prep with Golytely. Spoke with team 4 via phone, no plan to procedure with colonoscopy today and plan to start process for CALE placement. Discussed long-term nutrition plan for pt. Pt with functional GI tract and no obstruction since stent placement.   *Palliative care discuss with family today to determine goals of care.     Per RD note on 1/31:  Calorie count ordered from 1/28-1/30 and due for assessment today (1/31). Collect sheets from 1/28 and 1/30, no sheet for 1/29. Please see breakdown below. Overall, calorie count reveals pt meeting <25% of nutrient needs via po intake. Started on Megace 1/28 and TPN decreased to 35g AA, 150g dextrose, 50g SMOF to start weaning since pt with functional GI tract and no plan for TPN at d/c. Palliative follow for goals of care discussion. Spoke with Team 4 via phone today to discuss nutrition plan. Reviewed calorie count results with team. Team states goal is for pt to d/c to CALE once po intake increases. Discussed changing up supplements to offer a few different options and MD agreeable. Also, pt not appropriate for EN d/t hx of dementia and will not improve overall quality of life. Will continue to monitor and follow per protocol.     Previous Nutrition Diagnosis: Malnutrition R/T inadequate energy intake i/s/o declining mental and physical conditions AEB muscle/fat/ wt loss, poor po intake     Active [ x  ]  Resolved [   ]    If resolved, new PES:     Goal:  Pt will meet >75% of daily EER via tolerated route    Recommendations:  1. Low fiber diet  -Encouraged po intake  -Adjusted supplements to Ensure Enlive BID and Ensure Pudding once daily. Pt does not like Ensure Max  2. Long-term TPN not recommend d/t functional GI tract, however, Team 4 states plans to continue to meet half nutrient needs until po intake increases. Long-term nutrition support (PN or EN) is not recommended d/t dementia and will not improve overall quality of life  -Increase protein from 35g AA to 55g AA, 150g dextrose, 50g SMOF lipids. While on TPN, at least provide 100% of protein needs.   3. Continue Megace daily  4. Monitor labs  5. Bowel meds per MD    Education: Pt with dementia, no appropriate for education     Risk Level: High [ x ] Moderate [   ] Low [   ]  Macrina Gilbert RD,CDN,,CNSC.

## 2022-02-01 NOTE — CHART NOTE - NSCHARTNOTESELECT_GEN_ALL_CORE
Event Note
Event Note
Follow-up/Nutrition Services
Serial Abdominal Exam/Event Note
Calorie Count/Nutrition Services
Event Note
Event Note
Follow-up/Nutrition Services
Palliative Care Coordination

## 2022-02-01 NOTE — PROGRESS NOTE ADULT - ASSESSMENT
86 yo F PMH Dementia, Afib who presented with weakness, abdominal pain, and decreased appetite. Palliative Medicine consulted for complex decision making and to determin overall goals of care in setting of rectal mass.    ·	s/p family meeting with patient, daughters, and Dr. Pérez: patient is unlikely to tolerate surgical intervention, will not pursue at this time  ·	decision made to pursue home hospice referral, tentative plan for discharge on Friday pending hospice consents and DME delivery  ·	will need PICC line removed prior to discharge, TPN can be continued until Thurs if primary team wishes

## 2022-02-01 NOTE — PROGRESS NOTE ADULT - ASSESSMENT
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     #Dementia   avoid benzos and anticolinergics    # persistent tachycardia  Resolved  echo unremarkable  Unlikely sepsis    #Hypotension  improved    # Rectosigmoid cancer   s/p Flex Sig (1/21/22); obstructive mass seen in the rectosigmoid at ~10 cm s/p placement of uncovered 24 x 120 mm stent  Palliative consult appreciated  Pt is full code  follow hemeonc recs    # Chronic atrial fibrillation   on Xarelto + metoprolol as outpatient  - on amiodarone 200mg qd + metoprolol succinate 25mg qd + xarelto    # Urinary retention  Reyes replaced on 1.27.22 - Drained >1L upon placement   TOV per primary team;     # BMI <19  BMI: BMI (kg/m2): 16.5 (01-22-22 @ 05:00)  Nutrition consult  high protein diet    # Hypokalemia - repleted  Potassium, Serum: 4.0 mmol/L (01-27-22 @ 06:08)  # Hypophosphatemia - repleted  Phosphorus Level, Serum: 3.4 mg/dL (01-27-22 @ 06:08)    # vertebral compression fracture   pain controlled  NSGY and Neuroendovascular advised no intervention    # decreased po intake  consider NG feeds. Pt is not candidate for peg due to poor prognosis    #functional quadriplegia  PPS<30%  Palliative care on board to discuss GOC

## 2022-02-01 NOTE — PROGRESS NOTE ADULT - SUBJECTIVE AND OBJECTIVE BOX
s/p family meeting with patient, daughters, and Dr. Pérez  decision made to pursue home hospice referral, tentative plan for discharge on Friday pending hospice consents and DME delivery  patient is unlikely to tolerate surgical intervention, will not pursue at this time  will need PICC line removed prior to discharge, TPN can be continued until Thurs if primary team wishes    12:24-12:42         12:24-12:42 [facility]  Keith Dunbar, Palliative Care Attending  Contact Info: Call [phone] ([facility] Line) or message on [employer] Teams (Keith Dunbar)    SUBJECTIVE AND OBJECTIVE:  INTERVAL HPI/OVERNIGHT EVENTS: No significant interval events noted. Patient awake and answering questions, denies any specific complaints. Daughters at bedside for meeeting, GOC as noted below. Patient required no additional PRNs in past 24hrs. Eating some food but no drastic improvements in intake.    ALLERGIES:  No Known Allergies    MEDICATIONS  (STANDING):  aMIOdarone    Tablet 200 milliGRAM(s) Oral daily  chlorhexidine 2% Cloths 1 Application(s) Topical <User Schedule>  fat emulsion (Fish Oil and Plant Based) 20% Infusion 1.182 Gm/kG/Day (20.83 mL/Hr) IV Continuous <Continuous>  latanoprost 0.005% Ophthalmic Solution 1 Drop(s) Both EYES at bedtime  megestrol Suspension 800 milliGRAM(s) Oral every 24 hours  metoprolol succinate ER 50 milliGRAM(s) Oral daily  OLANZapine 5 milliGRAM(s) Oral at bedtime  Parenteral Nutrition - Adult 1 Each (25 mL/Hr) TPN Continuous <Continuous>  Parenteral Nutrition - Adult 1 Each (25 mL/Hr) TPN Continuous <Continuous>  rivaroxaban 20 milliGRAM(s) Oral with dinner    MEDICATIONS  (PRN):  OLANZapine Injectable 2.5 milliGRAM(s) IntraMuscular once PRN agitation  sodium chloride 0.9% lock flush 10 milliLiter(s) IV Push every 1 hour PRN Pre/post blood products, medications, blood draw, and to maintain line patency    ITEMS UNCHECKED ARE NOT PRESENT  PRESENT SYMPTOMS/REVIEW OF SYSTEMS: []Unable to obtain due to poor mentation   Source if other than patient:  []Family   []Team     Pain: []yes [x]no  QOL impact -   Location -       Aggravating factors -  Quality -   Radiation -  Timing -   Severity (0-10 scale):  Minimal acceptable level (0-10 scale):     Dyspnea:                           [ ]Mild [ ]Moderate [ ]Severe  Anxiety:                             [ ]Mild [ ]Moderate [ ]Severe  Fatigue:                             [x]Mild [ ]Moderate [ ]Severe  Nausea:                             []Mild [ ]Moderate [ ]Severe  Loss of appetite:              [ ]Mild [ ]Moderate [x]Severe  Constipation:                    [ ]Mild [ ]Moderate [ ]Severe    Other Symptoms:  [x]All other review of systems negative     Palliative Performance Status Version 2:        30%       GENERAL:  [ ]Alert  [x]Oriented x1   [x]Lethargic  [ ]Cachexia  [ ]Unarousable  [x]Verbal  [ ]Non-Verbal  Behavioral:   [ ] Anxiety  [x] Delirium [ ] Agitation [x] Cooperative  HEENT:  [ ]Normal   [x]Dry mouth   [ ]ET Tube/Trach  [ ]Oral lesions  PULMONARY:   [x]Clear [ ]Tachypnea  [ ]Audible excessive secretions   [ ]Rhonchi        [ ]Right [ ]Left [ ]Bilateral  [ ]Crackles        [ ]Right [ ]Left [ ]Bilateral  [ ]Wheezing     [ ]Right [ ]Left [ ]Bilateral  [ ]Diminished breath sounds [ ]right [ ]left [ ]bilateral  CARDIOVASCULAR:    [x]Regular [ ]Irregular [ ]Tachy  [ ]Kai [ ]Murmur [ ]Other  GASTROINTESTINAL:  [x]Soft  [x]Distended   [ ]+BS  [x]Non tender [ ]Tender  [ ]PEG [ ]OGT/ NGT  Last BM: 1/30   GENITOURINARY:  [x]Normal [ ] Incontinent   [ ]Oliguria/Anuria   [ ]Reyes  MUSCULOSKELETAL:   [ ]Normal   [x]Weakness  [x]Bed/Wheelchair bound [ ]Edema  NEUROLOGIC:   [ ]No focal deficits  [x]Cognitive impairment  [ ]Dysphagia [ ]Dysarthria [ ]Paresis [ ]Other   SKIN:   [x]Normal    [ ]Rash  [ ]Pressure ulcer(s)    Vital Signs Last 24 Hrs  T(C): 36.9 (01 Feb 2022 13:30), Max: 37.1 (01 Feb 2022 08:36)  T(F): 98.5 (01 Feb 2022 13:30), Max: 98.8 (01 Feb 2022 08:36)  HR: 95 (01 Feb 2022 13:30) (88 - 99)  BP: 112/64 (01 Feb 2022 13:30) (101/50 - 132/56)  BP(mean): 88 (01 Feb 2022 05:32) (71 - 88)  RR: 19 (01 Feb 2022 13:30) (18 - 22)  SpO2: 97% (01 Feb 2022 13:30) (97% - 100%)    LABS:                         7.2    4.98  )-----------( 173      ( 01 Feb 2022 07:54 )             23.8   02-01    140  |  107  |  16  ----------------------------<  121<H>  4.3   |  26  |  0.71    Ca    8.7      01 Feb 2022 07:54  Phos  4.1     02-01  Mg     2.3     02-01    RADIOLOGY & ADDITIONAL STUDIES:  < from: MR Lumbar Spine w/wo IV Cont (01.31.22 @ 22:30) >  There is straightening the normal cervical lordosis. There is diffuse   disc desiccation with mild multilevel disc space narrowing. There is a   Schmorl's node in the inferior endplate of L3. There is STIR   hyperintensity and enhancement surrounding the left L5-S1 facet joint   likely representing inflammatory facet degenerative changes.    The conus medullaris terminates at the L1-L2 level and is normal signal   morphology.    At the L1-L2 level there is moderate spinal canal stenosis secondary to   bony retropulsion. There is mild to moderate bilateral neural foraminal   narrowing secondary to disc bulge with facet hypertrophy.    At the L2-L3 level there is mild spinal canal stenosis secondary to bony   retropulsion with facet hypertrophy. There is moderate bilateral neural   foraminal narrowing secondary to disc bulge with facet hypertrophy.    At the L3-L4 level there is a disc bulge with facet hypertrophy resulting   in mild to moderate spinal canal stenosis and bilateral neural foraminal   narrowing.    At the L4-L5 level there is mild spinal canal stenosis secondary to bony   retropulsion. There is a disc bulge with facet hypertrophy with mild to   moderate bilateral neural foraminal narrowing.    At the L5-S1 level there is a disc bulge with a superimposed central disc   extrusion along with facet hypertrophy without significant spinal canal  stenosis or neural foraminal narrowing.      PROTEIN CALORIE MALNUTRITION PRESENT: [ ]mild [ ]moderate [x]severe [ ]underweight [ ]morbid obesity  [x] PPSV2 < or = 30% [x] significant weight loss [x] poor nutritional intake [] anasarca [] catabolic state    Artificial Nutrition []      DISCUSSION OF CASE: Daughters (Blanca Bauer) - to provide updates and emotional support; discussed ACP and Hospice

## 2022-02-01 NOTE — PROGRESS NOTE ADULT - SUBJECTIVE AND OBJECTIVE BOX
HPI:  88 yo female PMH of dementia, Afib (Xarelto, Last taken Tuesday) PPM (Indicated for tachy-yudith syndrome), cerebral LICA paraclinoid aneurysm (s/p stenting). Admitted for abdominal pain and 5 days of poor to none oral intake. CT scan done during this admission shows large bowel obstruction, narrowing of the rectum 9-12cm from the anal verge now s/p rectal stent.     Neurosurgery consulted for a lumbar compression deformity found on CT abdomen        (20 Jan 2022 22:01)    Vital Signs Last 24 Hrs  T(C): 37.1 (01 Feb 2022 08:36), Max: 37.1 (01 Feb 2022 08:36)  T(F): 98.8 (01 Feb 2022 08:36), Max: 98.8 (01 Feb 2022 08:36)  HR: 95 (01 Feb 2022 08:36) (84 - 99)  BP: 124/60 (01 Feb 2022 08:36) (101/50 - 126/61)  BP(mean): 88 (01 Feb 2022 05:32) (71 - 88)  RR: 20 (01 Feb 2022 08:36) (18 - 21)  SpO2: 100% (01 Feb 2022 08:36) (97% - 100%)    I&O's Summary    31 Jan 2022 07:01  -  01 Feb 2022 07:00  --------------------------------------------------------  IN: 1140.4 mL / OUT: 1651 mL / NET: -510.6 mL    01 Feb 2022 07:01  -  01 Feb 2022 10:55  --------------------------------------------------------  IN: 137.4 mL / OUT: 400 mL / NET: -262.6 mL        PHYSICAL EXAM:  General: NAD, pt is comfortably sitting up in bed, on room air  HEENT: CN II-XII grossly intact, PERRL 3mm briskly reactive, EOMI b/l, face symmetric, tongue midline, neck FROM  Cardiovascular: RRR, normal S1 and S2   Respiratory: lungs CTAB, no wheezing, rhonchi, or crackles   GI: normoactive BS to auscultation, abd soft, NTND   Neuro: A&Ox3, No aphasia, speech clear, no dysmetria, no pronator drift. Follows commands.  CARLOS x4 spontaneously, 5/5 strength in all extremities throughout. SILT throughout. No tenderness to palpation on midline spine.    Extremities: distal pulses 2+ x4    LABS:                        7.2    4.98  )-----------( 173      ( 01 Feb 2022 07:54 )             23.8     02-01    140  |  107  |  16  ----------------------------<  121<H>  4.3   |  26  |  0.71    Ca    8.7      01 Feb 2022 07:54  Phos  4.1     02-01  Mg     2.3     02-01              CAPILLARY BLOOD GLUCOSE      POCT Blood Glucose.: 130 mg/dL (01 Feb 2022 06:19)  POCT Blood Glucose.: 135 mg/dL (01 Feb 2022 00:11)      Drug Levels: [] N/A    CSF Analysis: [] N/A      Allergies    No Known Allergies    Intolerances      MEDICATIONS:  Antibiotics:    Neuro:  OLANZapine 5 milliGRAM(s) Oral at bedtime  OLANZapine Injectable 2.5 milliGRAM(s) IntraMuscular once PRN    Anticoagulation:  rivaroxaban 20 milliGRAM(s) Oral with dinner    OTHER:  aMIOdarone    Tablet 200 milliGRAM(s) Oral daily  chlorhexidine 2% Cloths 1 Application(s) Topical <User Schedule>  latanoprost 0.005% Ophthalmic Solution 1 Drop(s) Both EYES at bedtime  megestrol Suspension 800 milliGRAM(s) Oral every 24 hours  metoprolol succinate ER 50 milliGRAM(s) Oral daily    IVF:  fat emulsion (Fish Oil and Plant Based) 20% Infusion 1.182 Gm/kG/Day IV Continuous <Continuous>  fat emulsion (Fish Oil and Plant Based) 20% Infusion 1.182 Gm/kG/Day IV Continuous <Continuous>  Parenteral Nutrition - Adult 1 Each TPN Continuous <Continuous>  Parenteral Nutrition - Adult 1 Each TPN Continuous <Continuous>    CULTURES:  Culture Results:   No Growth Final (01-21 @ 00:53)  Culture Results:   No Growth Final (01-21 @ 00:51)    RADIOLOGY & ADDITIONAL TESTS:  < from: MR Lumbar Spine w/wo IV Cont (01.31.22 @ 22:30) >  FINDINGS:  Vertebrae: L2 compression fracture with a loss of height measuring 70%   anteriorly and 20% posteriorly. There is edema throughout the fracture.   There is anterior pulsion of the anterior cortex by 3 mm. There is   retropulsion of posterior cortex by 5 mm. This results in mild central   canal stenosis.  Chronic compression deformities of T 12, L3, and L5. There is otherwise   normal bone marrow signal throughout. There is no other area of edema.  Spinal cord: Normal signal. No cord compression.  L1-L2: No significant disc disease. Mild central canal stenosis secondary   to the L2 fracture.  Moderate bilateral foraminal stenosis.  L2-L3: No significant disc disease. No significant spinal canal stenosis.   No neural foraminal stenosis.  L3-L4: No significant disc disease. No significant spinal canal stenosis.   No neural foraminal stenosis.  L4-L5: No significant disc disease. No significant spinal canal stenosis.   No neural foraminal stenosis.  L5-S1: No significant disc disease. No significant spinal canal stenosis.   No neural foraminal stenosis.  Soft tissues: Normal    IMPRESSION:  1. Acute compression fracture of the L2 vertebral body with a loss of   height between 20 and 40% and retropulsion of posterior cortex by 5 mm.   This results in mild central canal stenosis. This is unchanged over last   11 days.  2. Chronic compression deformities of T12, L3, and L5. There is no other   acute traumatic injury of the lumbar spine.    < end of copied text >  < from: CT Abdomen and Pelvis w/ IV Cont (01.20.22 @ 15:45) >    IMPRESSION:  1.  Suspect obstructing annular high rectal mass.  2.  Multiple groundglass airspace opacities in the visualized right lower   and middle lobes with small dense consolidation in the right lower lobe,   possibly infectious or inflammatory/aspiration.  3.  Prominent fluid filled bladder. Correlate clinically with retention.  4.  Severe L2 vertebral body compression deformity and retropulsed   posterior fragment moderately narrowing the central canal at this level.    < end of copied text >      ASSESSMENT:  88 yo female PMH of dementia, Afib (Xarelto, Last taken Tuesday) PPM (Indicated for tachy-yudith syndrome), cerebral LICA paraclinoid aneurysm (s/p stenting). Admitted for abdominal pain and 5 days of poor to none oral intake. CT scan done during this admission shows large bowel obstruction, narrowing of the rectum 9-12cm and with incidental finding of L2 vertebral compression fracture.     PLAN  - Care per primary team   - MRI imaging obtained and reviewed re-demonstrating acute L2 compression fracture with Dr. Lopez  - No neurosurgical intervention necessary at this time  - Neuroendovascular (Dr. Rivera) consulted for possible kyphoplasty, however not necessary at this time because no pain/TTP on physical exam  - Neurosurgery signing off     Please reconsult neurosurgery with any questions or new neurologic concerns at [phone].     Assessment and plan discussed with Dr. Lopez and Dr. Rivera  NEUROSURGERY SIGN OFF NOTE  HPI:  86 yo female PMH of dementia, Afib (Xarelto, Last taken Tuesday) PPM (Indicated for tachy-yudith syndrome), cerebral LICA paraclinoid aneurysm (s/p stenting). Admitted for abdominal pain and 5 days of poor to none oral intake. CT scan done during this admission shows large bowel obstruction, narrowing of the rectum 9-12cm from the anal verge now s/p rectal stent.     Neurosurgery consulted for a lumbar compression deformity found on CT abdomen        (20 Jan 2022 22:01)    Vital Signs Last 24 Hrs  T(C): 37.1 (01 Feb 2022 08:36), Max: 37.1 (01 Feb 2022 08:36)  T(F): 98.8 (01 Feb 2022 08:36), Max: 98.8 (01 Feb 2022 08:36)  HR: 95 (01 Feb 2022 08:36) (84 - 99)  BP: 124/60 (01 Feb 2022 08:36) (101/50 - 126/61)  BP(mean): 88 (01 Feb 2022 05:32) (71 - 88)  RR: 20 (01 Feb 2022 08:36) (18 - 21)  SpO2: 100% (01 Feb 2022 08:36) (97% - 100%)    I&O's Summary    31 Jan 2022 07:01  -  01 Feb 2022 07:00  --------------------------------------------------------  IN: 1140.4 mL / OUT: 1651 mL / NET: -510.6 mL    01 Feb 2022 07:01  -  01 Feb 2022 10:55  --------------------------------------------------------  IN: 137.4 mL / OUT: 400 mL / NET: -262.6 mL        PHYSICAL EXAM:  General: NAD, pt is comfortably sitting up in bed, on room air  HEENT: CN II-XII grossly intact, PERRL 3mm briskly reactive, EOMI b/l, face symmetric, tongue midline, neck FROM  Cardiovascular: RRR, normal S1 and S2   Respiratory: lungs CTAB, no wheezing, rhonchi, or crackles   GI: normoactive BS to auscultation, abd soft, NTND   Neuro: A&Ox3, No aphasia, speech clear, no dysmetria, no pronator drift. Follows commands.  CARLOS x4 spontaneously, 5/5 strength in all extremities throughout. SILT throughout. No tenderness to palpation on midline spine.    Extremities: distal pulses 2+ x4    LABS:                        7.2    4.98  )-----------( 173      ( 01 Feb 2022 07:54 )             23.8     02-01    140  |  107  |  16  ----------------------------<  121<H>  4.3   |  26  |  0.71    Ca    8.7      01 Feb 2022 07:54  Phos  4.1     02-01  Mg     2.3     02-01              CAPILLARY BLOOD GLUCOSE      POCT Blood Glucose.: 130 mg/dL (01 Feb 2022 06:19)  POCT Blood Glucose.: 135 mg/dL (01 Feb 2022 00:11)      Drug Levels: [] N/A    CSF Analysis: [] N/A      Allergies    No Known Allergies    Intolerances      MEDICATIONS:  Antibiotics:    Neuro:  OLANZapine 5 milliGRAM(s) Oral at bedtime  OLANZapine Injectable 2.5 milliGRAM(s) IntraMuscular once PRN    Anticoagulation:  rivaroxaban 20 milliGRAM(s) Oral with dinner    OTHER:  aMIOdarone    Tablet 200 milliGRAM(s) Oral daily  chlorhexidine 2% Cloths 1 Application(s) Topical <User Schedule>  latanoprost 0.005% Ophthalmic Solution 1 Drop(s) Both EYES at bedtime  megestrol Suspension 800 milliGRAM(s) Oral every 24 hours  metoprolol succinate ER 50 milliGRAM(s) Oral daily    IVF:  fat emulsion (Fish Oil and Plant Based) 20% Infusion 1.182 Gm/kG/Day IV Continuous <Continuous>  fat emulsion (Fish Oil and Plant Based) 20% Infusion 1.182 Gm/kG/Day IV Continuous <Continuous>  Parenteral Nutrition - Adult 1 Each TPN Continuous <Continuous>  Parenteral Nutrition - Adult 1 Each TPN Continuous <Continuous>    CULTURES:  Culture Results:   No Growth Final (01-21 @ 00:53)  Culture Results:   No Growth Final (01-21 @ 00:51)    RADIOLOGY & ADDITIONAL TESTS:  < from: MR Lumbar Spine w/wo IV Cont (01.31.22 @ 22:30) >  FINDINGS:  Vertebrae: L2 compression fracture with a loss of height measuring 70%   anteriorly and 20% posteriorly. There is edema throughout the fracture.   There is anterior pulsion of the anterior cortex by 3 mm. There is   retropulsion of posterior cortex by 5 mm. This results in mild central   canal stenosis.  Chronic compression deformities of T 12, L3, and L5. There is otherwise   normal bone marrow signal throughout. There is no other area of edema.  Spinal cord: Normal signal. No cord compression.  L1-L2: No significant disc disease. Mild central canal stenosis secondary   to the L2 fracture.  Moderate bilateral foraminal stenosis.  L2-L3: No significant disc disease. No significant spinal canal stenosis.   No neural foraminal stenosis.  L3-L4: No significant disc disease. No significant spinal canal stenosis.   No neural foraminal stenosis.  L4-L5: No significant disc disease. No significant spinal canal stenosis.   No neural foraminal stenosis.  L5-S1: No significant disc disease. No significant spinal canal stenosis.   No neural foraminal stenosis.  Soft tissues: Normal    IMPRESSION:  1. Acute compression fracture of the L2 vertebral body with a loss of   height between 20 and 40% and retropulsion of posterior cortex by 5 mm.   This results in mild central canal stenosis. This is unchanged over last   11 days.  2. Chronic compression deformities of T12, L3, and L5. There is no other   acute traumatic injury of the lumbar spine.    < end of copied text >  < from: CT Abdomen and Pelvis w/ IV Cont (01.20.22 @ 15:45) >    IMPRESSION:  1.  Suspect obstructing annular high rectal mass.  2.  Multiple groundglass airspace opacities in the visualized right lower   and middle lobes with small dense consolidation in the right lower lobe,   possibly infectious or inflammatory/aspiration.  3.  Prominent fluid filled bladder. Correlate clinically with retention.  4.  Severe L2 vertebral body compression deformity and retropulsed   posterior fragment moderately narrowing the central canal at this level.    < end of copied text >      ASSESSMENT:  86 yo female PMH of dementia, Afib (Xarelto, Last taken Tuesday) PPM (Indicated for tachy-yudith syndrome), cerebral LICA paraclinoid aneurysm (s/p stenting). Admitted for abdominal pain and 5 days of poor to none oral intake. CT scan done during this admission shows large bowel obstruction, narrowing of the rectum 9-12cm and with incidental finding of L2 vertebral compression fracture.     PLAN  - Care per primary team   - MRI imaging obtained and reviewed re-demonstrating acute L2 compression fracture with Dr. Lopez  - No neurosurgical intervention necessary at this time  - Neuroendovascular (Dr. Rivera) consulted for possible kyphoplasty, however not necessary at this time because no pain/TTP on physical exam  - Neurosurgery signing off     Please reconsult neurosurgery with any questions or new neurologic concerns at [phone].     Assessment and plan discussed with Dr. Lopez and Dr. Rivera

## 2022-02-01 NOTE — PROVIDER CONTACT NOTE (OTHER) - ACTION/TREATMENT ORDERED:
Place johnson catheter.
Assessed patient,noted when wiping rectal area there was scant pinkish color discharge,VS taken and recorded and Dr.Mohamed Lopez notified.

## 2022-02-01 NOTE — PROGRESS NOTE ADULT - PROBLEM SELECTOR PLAN 5
.  s/p colonic stent with resolution of obstruction  -no evidence of metastatic disease on imaging  -surgical resection could be curative but is not making progress towards being optimized for surgery .  s/p colonic stent with resolution of obstruction  -no evidence of metastatic disease on imaging  -surgical resection could be curative but Surg Onc and family are in agreement that patient is unlikely to tolerate intervention and has not shown the necessary progress to make future optimization realistic

## 2022-02-01 NOTE — PROVIDER CONTACT NOTE (OTHER) - SITUATION
Patient had OT. session and was OOB with therapist. when she was being assissted back to bed OT. Poonam Toledo called RN. to bedside stating there was pinkish pink mucus discharge on bed pad.

## 2022-02-02 ENCOUNTER — TRANSCRIPTION ENCOUNTER (OUTPATIENT)
Age: 87
End: 2022-02-02

## 2022-02-02 LAB
ANION GAP SERPL CALC-SCNC: 10 MMOL/L — SIGNIFICANT CHANGE UP (ref 5–17)
BUN SERPL-MCNC: 14 MG/DL — SIGNIFICANT CHANGE UP (ref 7–23)
CALCIUM SERPL-MCNC: 8.3 MG/DL — LOW (ref 8.4–10.5)
CHLORIDE SERPL-SCNC: 111 MMOL/L — HIGH (ref 96–108)
CHOLEST SERPL-MCNC: 110 MG/DL — SIGNIFICANT CHANGE UP
CO2 SERPL-SCNC: 24 MMOL/L — SIGNIFICANT CHANGE UP (ref 22–31)
CREAT SERPL-MCNC: 0.68 MG/DL — SIGNIFICANT CHANGE UP (ref 0.5–1.3)
GLUCOSE SERPL-MCNC: 126 MG/DL — HIGH (ref 70–99)
HCT VFR BLD CALC: 24.8 % — LOW (ref 34.5–45)
HDLC SERPL-MCNC: 34 MG/DL — LOW
HGB BLD-MCNC: 7.5 G/DL — LOW (ref 11.5–15.5)
LIPID PNL WITH DIRECT LDL SERPL: 63 MG/DL — SIGNIFICANT CHANGE UP
MAGNESIUM SERPL-MCNC: 2.1 MG/DL — SIGNIFICANT CHANGE UP (ref 1.6–2.6)
MCHC RBC-ENTMCNC: 27.9 PG — SIGNIFICANT CHANGE UP (ref 27–34)
MCHC RBC-ENTMCNC: 30.2 GM/DL — LOW (ref 32–36)
MCV RBC AUTO: 92.2 FL — SIGNIFICANT CHANGE UP (ref 80–100)
NON HDL CHOLESTEROL: 76 MG/DL — SIGNIFICANT CHANGE UP
NRBC # BLD: 0 /100 WBCS — SIGNIFICANT CHANGE UP (ref 0–0)
PHOSPHATE SERPL-MCNC: 4.1 MG/DL — SIGNIFICANT CHANGE UP (ref 2.5–4.5)
PLATELET # BLD AUTO: 195 K/UL — SIGNIFICANT CHANGE UP (ref 150–400)
POTASSIUM SERPL-MCNC: 4.4 MMOL/L — SIGNIFICANT CHANGE UP (ref 3.5–5.3)
POTASSIUM SERPL-SCNC: 4.4 MMOL/L — SIGNIFICANT CHANGE UP (ref 3.5–5.3)
RBC # BLD: 2.69 M/UL — LOW (ref 3.8–5.2)
RBC # FLD: 15.8 % — HIGH (ref 10.3–14.5)
SARS-COV-2 RNA SPEC QL NAA+PROBE: SIGNIFICANT CHANGE UP
SODIUM SERPL-SCNC: 145 MMOL/L — SIGNIFICANT CHANGE UP (ref 135–145)
TRIGL SERPL-MCNC: 66 MG/DL — SIGNIFICANT CHANGE UP
WBC # BLD: 6.22 K/UL — SIGNIFICANT CHANGE UP (ref 3.8–10.5)
WBC # FLD AUTO: 6.22 K/UL — SIGNIFICANT CHANGE UP (ref 3.8–10.5)

## 2022-02-02 PROCEDURE — 99231 SBSQ HOSP IP/OBS SF/LOW 25: CPT

## 2022-02-02 RX ORDER — ELECTROLYTE SOLUTION,INJ
1 VIAL (ML) INTRAVENOUS
Refills: 0 | Status: DISCONTINUED | OUTPATIENT
Start: 2022-02-02 | End: 2022-02-02

## 2022-02-02 RX ORDER — RIVAROXABAN 15 MG-20MG
15 KIT ORAL
Refills: 0 | Status: DISCONTINUED | OUTPATIENT
Start: 2022-02-02 | End: 2022-02-04

## 2022-02-02 RX ORDER — I.V. FAT EMULSION 20 G/100ML
1.18 EMULSION INTRAVENOUS
Qty: 50 | Refills: 0 | Status: DISCONTINUED | OUTPATIENT
Start: 2022-02-02 | End: 2022-02-02

## 2022-02-02 RX ADMIN — I.V. FAT EMULSION 20.83 GM/KG/DAY: 20 EMULSION INTRAVENOUS at 22:01

## 2022-02-02 RX ADMIN — AMIODARONE HYDROCHLORIDE 200 MILLIGRAM(S): 400 TABLET ORAL at 10:53

## 2022-02-02 RX ADMIN — CHLORHEXIDINE GLUCONATE 1 APPLICATION(S): 213 SOLUTION TOPICAL at 05:44

## 2022-02-02 RX ADMIN — Medication 50 MILLIGRAM(S): at 10:54

## 2022-02-02 RX ADMIN — Medication 1 EACH: at 17:46

## 2022-02-02 RX ADMIN — RIVAROXABAN 15 MILLIGRAM(S): KIT at 21:59

## 2022-02-02 RX ADMIN — LATANOPROST 1 DROP(S): 0.05 SOLUTION/ DROPS OPHTHALMIC; TOPICAL at 21:10

## 2022-02-02 RX ADMIN — OLANZAPINE 5 MILLIGRAM(S): 15 TABLET, FILM COATED ORAL at 21:12

## 2022-02-02 RX ADMIN — MEGESTROL ACETATE 800 MILLIGRAM(S): 40 SUSPENSION ORAL at 17:53

## 2022-02-02 NOTE — PROGRESS NOTE ADULT - ATTENDING COMMENTS
Discussion yesterday with family and palliative care, decision to proceed with home hospice as she is not a surgical candidate.  Eating mostly snacks.  Continue TPN at half rate, then stop tomorrow.  Reyes for urinary retention  On Xarelto  Limit blood draws.

## 2022-02-02 NOTE — DISCHARGE NOTE PROVIDER - NSDCMRMEDTOKEN_GEN_ALL_CORE_FT
metoprolol succinate 50 mg oral tablet, extended release: 1 tab(s) orally once a day  ** dose increased   Thoracic-Lumbar-Sacral Orthotics (TLSO) Brace CPT code  for Lumbar Vertebral Compression Fracture. NPI [id number]: Apply topically to affected area once a day   XARELTO 20MG TABLETS: TAKE 1 TABLET BY MOUTH DAILY   amiodarone 200 mg oral tablet: 1 tab(s) orally once a day  latanoprost 0.005% ophthalmic solution: 1 drop(s) to each affected eye once a day (at bedtime)  megestrol 40 mg/mL oral suspension: 20 milliliter(s) orally every 24 hours  metoprolol succinate 50 mg oral tablet, extended release: 1 tab(s) orally once a day  ** dose increased   OLANZapine 5 mg oral tablet: 1 tab(s) orally once a day (at bedtime) as needed for agitation  Thoracic-Lumbar-Sacral Orthotics (TLSO) Brace CPT code  for Lumbar Vertebral Compression Fracture. NPI [id number]: Apply topically to affected area once a day   XARELTO 20MG TABLETS: 1 tab orally once a day   amiodarone 200 mg oral tablet: 1 tab(s) orally once a day  latanoprost 0.005% ophthalmic solution: 1 drop(s) to each affected eye once a day (at bedtime)  megestrol 40 mg/mL oral suspension: 20 milliliter(s) orally every 24 hours  metoprolol succinate 50 mg oral tablet, extended release: 1 tab(s) orally once a day  ** dose increased   OLANZapine 5 mg oral tablet: 1 tab(s) orally once a day (at bedtime) as needed for agitation  rivaroxaban 15 mg oral tablet: 1 tab(s) orally once a day (before a meal)  Thoracic-Lumbar-Sacral Orthotics (TLSO) Brace CPT code  for Lumbar Vertebral Compression Fracture. NPI [id number]: Apply topically to affected area once a day    amiodarone 200 mg oral tablet: 1 tab(s) orally once a day  latanoprost 0.005% ophthalmic solution: 1 drop(s) to each affected eye once a day (at bedtime)  megestrol 40 mg/mL oral suspension: 20 milliliter(s) orally every 24 hours  metoprolol succinate 50 mg oral tablet, extended release: 1 tab(s) orally once a day  ** dose increased   OLANZapine 5 mg oral tablet: 1 tab(s) orally once a day (at bedtime) -for agitation   rivaroxaban 15 mg oral tablet: 1 tab(s) orally once a day (before a meal)  Thoracic-Lumbar-Sacral Orthotics (TLSO) Brace CPT code  for Lumbar Vertebral Compression Fracture. NPI [id number]: Apply topically to affected area once a day

## 2022-02-02 NOTE — DISCHARGE NOTE PROVIDER - NSDCCPCAREPLAN_GEN_ALL_CORE_FT
PRINCIPAL DISCHARGE DIAGNOSIS  Diagnosis: Large bowel obstruction  Assessment and Plan of Treatment:

## 2022-02-02 NOTE — PROGRESS NOTE ADULT - ASSESSMENT
88 yo female PMH of dementia, Afib, PPM (Indicated for tachy-yudith syndrome), cerebral LICA paraclinoid aneurysm (s/p stenting) presented with LBO w/ obstructing rectal mass now s/p rectal stent (1/21). Course c/b a-fib with RVR transferred to SICU s/p amio drip now stable and stepped down to tele. LE Duplex (1/30) no DVT    LRD, PICC/TPN   Home meds as appropriate  Ceftriaxone(1/21-1/26)  Pain/nausea PRN  Amio/Metoprolol for AFib  Xarelto  Megace appetite stimulant  ISS  AM labs  Dispo: home hospice w/o TPN

## 2022-02-02 NOTE — DISCHARGE NOTE PROVIDER - CARE PROVIDERS DIRECT ADDRESSES
,[email or web address]_Unknown,[email or web address]

## 2022-02-02 NOTE — DISCHARGE NOTE PROVIDER - NSDCFUSCHEDAPPT_GEN_ALL_CORE_FT
BRANDEN DOMINGUEZ ; 02/15/2022 ; [facility] [address]  BRANDEN DOMINGUEZ ; 04/06/2022 ; [facility] [address]

## 2022-02-02 NOTE — DISCHARGE NOTE PROVIDER - DETAILS OF MALNUTRITION DIAGNOSIS/DIAGNOSES
This patient has been assessed with a concern for Malnutrition and was treated during this hospitalization for the following Nutrition diagnosis/diagnoses:     -  01/22/2022: Severe protein-calorie malnutrition   -  01/22/2022: Underweight (BMI < 19)

## 2022-02-02 NOTE — DISCHARGE NOTE PROVIDER - HOSPITAL COURSE
86yo female with afib (on Xarelto) dementia presented to [facility] with L sided abdominal pain a/w weakness and poor PO intake. Exam significant for distention, CT with evidence of large bowel obstruction, narrowing of the rectum 9-12cm from the anal verge. Pt underwent flex sig on 1/21 with stenting. Course notable for Afib with RVR, transferred to SICU, cardiology consulted s/p amio drip and maintenance dosing returned to NSR and stepped down to tele. CT scan also incidentally found L2 vertebral compression fracture which neurosurgery was consult on, back brace was ordered, and deemed no neurosurgical intervention was needed. Pt's clinical status was much improved after stenting, pain resolved, PO intake improved, and had persistent bowel function. On day of discharge pt was taken off of TPN and was stable for dc to home with hospice per family's wishes.

## 2022-02-02 NOTE — DISCHARGE NOTE PROVIDER - NSDCFUADDINST_GEN_ALL_CORE_FT
Colorectal Follow Up:  Please follow up with Dr. Pérez in one week; you may call the office to make an appointment at your earliest convenience.    Gastroenterology Follow Up:  Please follow up with Dr. Martin in 2 weeks; you may call the office to make an appointment at your earliest convenience.    General Discharge Instructions:  Please resume all regular home medications unless specifically advised not to take a particular medication. Also, please take any new medications as prescribed.  Please get plenty of rest, continue to ambulate several times per day, and drink adequate amounts of fluids.   Please follow-up with your surgeon and Primary Care Provider (PCP) in 1-2 weeks.     Colorectal Follow Up:  Please follow up with Dr. Pérez in one week; you may call the office to make an appointment at your earliest convenience.    Gastroenterology Follow Up:  Please follow up with Dr. Martin in 2 weeks; you may call the office to make an appointment at your earliest convenience.    General Discharge Instructions:  Please resume all regular home medications unless specifically advised not to take a particular medication. Also, please take any new medications as prescribed.  Please get plenty of rest, continue to ambulate several times per day, and drink adequate amounts of fluids.   Please follow-up with your surgeon and Primary Care Provider (PCP) in 1-2 weeks.    All medications have been sent to [facility]. Megestrol, amiodarone, olanzapine (as needed for agitation), and lantoprost eye drops have been sent. Please  before discharge. Please continue taking Xarelto and metoprolol.     Colorectal Follow Up:  Please follow up with Dr. Pérez in one week; you may call the office to make an appointment at your earliest convenience.    Gastroenterology Follow Up:  Please follow up with Dr. Martin in 2 weeks; you may call the office to make an appointment at your earliest convenience.    General Discharge Instructions:  Please resume all regular home medications unless specifically advised not to take a particular medication. Also, please take any new medications as prescribed.  Please get plenty of rest, continue to ambulate several times per day, and drink adequate amounts of fluids.   Please follow-up with your surgeon and Primary Care Provider (PCP) in 1-2 weeks.    All medications have been sent to [facility]. Megestrol, amiodarone, olanzapine (as needed for agitation), and lantoprost eye drops have been sent. Please  before discharge. Please discontinue taking Xarelto 20 mg tablet per day. A prescription for Xarelto 15 mg tabelt per day has been sent and you should take that instead. Please continue taking metoprolol.     Colorectal Follow Up:  Please follow up with Dr. Pérez in one week; you may call the office to make an appointment at your earliest convenience.    Gastroenterology Follow Up:  Please follow up with Dr. Martin in 2 weeks; you may call the office to make an appointment at your earliest convenience.    General Discharge Instructions:  Please resume all regular home medications unless specifically advised not to take a particular medication. Also, please take any new medications as prescribed.  Please get plenty of rest, continue to ambulate several times per day, and drink adequate amounts of fluids.   Please follow-up with your surgeon and Primary Care Provider (PCP) in 1-2 weeks.    All medications have been sent to [facility]. Megestrol, amiodarone, olanzapine (for agitation), and lantoprost eye drops have been sent. Please  before discharge. Please discontinue taking Xarelto 20 mg tablet per day. A prescription for Xarelto 15 mg tabelt per day has been sent and you should take that instead. Please continue taking metoprolol.     Colorectal Follow Up:  Please follow up with Dr. Pérez in one week; you may call the office to make an appointment at your earliest convenience.    Gastroenterology Follow Up:  Please follow up with Dr. Martin in 2 weeks; you may call the office to make an appointment at your earliest convenience.    General Discharge Instructions:  Please resume all regular home medications unless specifically advised not to take a particular medication. Also, please take any new medications as prescribed.  Please get plenty of rest, continue to ambulate several times per day, and drink adequate amounts of fluids.   Please follow-up with your surgeon and Primary Care Provider (PCP) in 1-2 weeks.    All medications have been sent to [facility]. Megestrol, amiodarone, olanzapine (for agitation), and lantoprost eye drops have been sent. Please  before discharge. Please discontinue taking Xarelto 20 mg tablet per day. A prescription for Xarelto 15 mg tabelt per day has been sent and you should take that instead. Please continue taking metoprolol.    You will be discharged with johnson and home hospice will manage it.

## 2022-02-02 NOTE — PROGRESS NOTE ADULT - ATTENDING SUPERVISION STATEMENT
Fellow
ACP
Fellow
Resident
Resident
Fellow
Resident

## 2022-02-02 NOTE — DISCHARGE NOTE PROVIDER - CARE PROVIDER_API CALL
Mirza Pérez)  ColonRectal Surgery; Surgery  [address] [place] [address], [address]  [place] [address]  Phone: [phone]  Fax: [phone]  Follow Up Time:     Marcelo Martin  GASTROENTEROLOGY  [address] [address]  Phone: [phone]  Fax: [phone]  Follow Up Time:

## 2022-02-03 PROCEDURE — 99233 SBSQ HOSP IP/OBS HIGH 50: CPT

## 2022-02-03 RX ADMIN — Medication 50 MILLIGRAM(S): at 05:15

## 2022-02-03 RX ADMIN — OLANZAPINE 5 MILLIGRAM(S): 15 TABLET, FILM COATED ORAL at 21:27

## 2022-02-03 RX ADMIN — AMIODARONE HYDROCHLORIDE 200 MILLIGRAM(S): 400 TABLET ORAL at 05:15

## 2022-02-03 RX ADMIN — CHLORHEXIDINE GLUCONATE 1 APPLICATION(S): 213 SOLUTION TOPICAL at 05:15

## 2022-02-03 RX ADMIN — MEGESTROL ACETATE 800 MILLIGRAM(S): 40 SUSPENSION ORAL at 17:12

## 2022-02-03 RX ADMIN — RIVAROXABAN 15 MILLIGRAM(S): KIT at 17:17

## 2022-02-03 NOTE — PROGRESS NOTE ADULT - PROBLEM SELECTOR PLAN 6
.  FAST 7C; bedbound and malnutrition  -poor prognostic factor with comorbid malignancy  -meets criteria for home hospice with diagnosis of dementia given bedbound status, malnutrition, and hypoalbuminemia (Alb = 2.5); untreated locally advanced malignancy is supporting evidence for limited life expectancy

## 2022-02-03 NOTE — PROGRESS NOTE ADULT - SUBJECTIVE AND OBJECTIVE BOX
Remove PICC prior to discharge  Keep johnson in place, Hospice will manage    Will follow up with daughter/HCP regarding code status [facility]  Keith Dunbar, Palliative Care Attending  Contact Info: Call [phone] ([facility] Line) or message on [employer] Teams (Keith Dunbar)    SUBJECTIVE AND OBJECTIVE:  INTERVAL HPI/OVERNIGHT EVENTS: No significant interval events noted. Patient required no PRNs in past 24hrs. No major symptoms reported. Called daughter to discuss code status, no decision made.     ALLERGIES:  No Known Allergies    MEDICATIONS  (STANDING):  aMIOdarone    Tablet 200 milliGRAM(s) Oral daily  chlorhexidine 2% Cloths 1 Application(s) Topical <User Schedule>  latanoprost 0.005% Ophthalmic Solution 1 Drop(s) Both EYES at bedtime  megestrol Suspension 800 milliGRAM(s) Oral every 24 hours  metoprolol succinate ER 50 milliGRAM(s) Oral daily  OLANZapine 5 milliGRAM(s) Oral at bedtime  rivaroxaban 15 milliGRAM(s) Oral with dinner    MEDICATIONS  (PRN):  OLANZapine Injectable 2.5 milliGRAM(s) IntraMuscular once PRN agitation  sodium chloride 0.9% lock flush 10 milliLiter(s) IV Push every 1 hour PRN Pre/post blood products, medications, blood draw, and to maintain line patency      ITEMS UNCHECKED ARE NOT PRESENT  PRESENT SYMPTOMS/REVIEW OF SYSTEMS: []Unable to obtain due to poor mentation   Source if other than patient:  []Family   []Team     Pain: []yes [x]no  QOL impact -   Location -       Aggravating factors -  Quality -   Radiation -  Timing -   Severity (0-10 scale):  Minimal acceptable level (0-10 scale):     Dyspnea:                           [ ]Mild [ ]Moderate [ ]Severe  Anxiety:                             [ ]Mild [ ]Moderate [ ]Severe  Fatigue:                             [x]Mild [ ]Moderate [ ]Severe  Nausea:                             []Mild [ ]Moderate [ ]Severe  Loss of appetite:              [ ]Mild [ ]Moderate [x]Severe  Constipation:                    [ ]Mild [ ]Moderate [ ]Severe    Other Symptoms:  [x]All other review of systems negative     Palliative Performance Status Version 2:        30%       GENERAL:  [ ]Alert  [x]Oriented x1   [x]Lethargic  [ ]Cachexia  [ ]Unarousable  [x]Verbal  [ ]Non-Verbal  Behavioral:   [ ] Anxiety  [x] Delirium [ ] Agitation [x] Cooperative  HEENT:  [ ]Normal   [x]Dry mouth   [ ]ET Tube/Trach  [ ]Oral lesions  PULMONARY:   [x]Clear [ ]Tachypnea  [ ]Audible excessive secretions   [ ]Rhonchi        [ ]Right [ ]Left [ ]Bilateral  [ ]Crackles        [ ]Right [ ]Left [ ]Bilateral  [ ]Wheezing     [ ]Right [ ]Left [ ]Bilateral  [ ]Diminished breath sounds [ ]right [ ]left [ ]bilateral  CARDIOVASCULAR:    [x]Regular [ ]Irregular [ ]Tachy  [ ]Kai [ ]Murmur [ ]Other  GASTROINTESTINAL:  [x]Soft  [x]Distended   [ ]+BS  [x]Non tender [ ]Tender  [ ]PEG [ ]OGT/ NGT  Last BM: 1/30   GENITOURINARY:  [x]Normal [ ] Incontinent   [ ]Oliguria/Anuria   [ ]Reyes  MUSCULOSKELETAL:   [ ]Normal   [x]Weakness  [x]Bed/Wheelchair bound [ ]Edema  NEUROLOGIC:   [ ]No focal deficits  [x]Cognitive impairment  [ ]Dysphagia [ ]Dysarthria [ ]Paresis [ ]Other   SKIN:   [x]Normal    [ ]Rash  [ ]Pressure ulcer(s)    Vital Signs Last 24 Hrs  T(C): 36.7 (03 Feb 2022 05:02), Max: 36.8 (02 Feb 2022 09:31)  T(F): 98.1 (03 Feb 2022 05:02), Max: 98.3 (02 Feb 2022 09:31)  HR: 96 (03 Feb 2022 05:02) (90 - 104)  BP: 135/54 (03 Feb 2022 05:02) (103/55 - 141/83)  BP(mean): --  RR: 17 (03 Feb 2022 05:02) (16 - 18)  SpO2: 99% (03 Feb 2022 05:02) (96% - 100%    LABS: None new    RADIOLOGY & ADDITIONAL STUDIES: None new    PROTEIN CALORIE MALNUTRITION PRESENT: [ ]mild [ ]moderate [x]severe [ ]underweight [ ]morbid obesity  [x] PPSV2 < or = 30% [x] significant weight loss [x] poor nutritional intake [] anasarca [] catabolic state    Artificial Nutrition []      DISCUSSION OF CASE: daughter (Blanca) - to provide updates and emotional support    Goals of Care Document:   Care Coordination Document:   PROGRESS NOTE  Date & Time of Note   2022-02-03 02:05  Notes: Palliative SW confirmed delivery of bed and nursing visit with [facility] [phone] for Friday 2/4/22. Palliative Sw confirmed with  both daughters Blanca and Lizette. Palliative SW arranged S ambulance with [facility]  care for 11:00AM, trip # [id number], forms faxed. Daughter Blanca will accompany  patient home. Medical team, floor SW and CM aware of discharge plan for Friday 2/4/22 at 11:00AM with [facility] hospice and private care. Palliative team  will remain available.  Blanca - daughter [phone]  Electronically signed by:  Pancho Peck  Electronically signed on:  2022-02-03  14:19

## 2022-02-03 NOTE — PROGRESS NOTE ADULT - ASSESSMENT
88 yo female PMH of dementia, Afib, PPM (Indicated for tachy-yudith syndrome), cerebral LICA paraclinoid aneurysm (s/p stenting) presented with LBO w/ obstructing rectal mass now s/p rectal stent (1/21). Course c/b a-fib with RVR transferred to SICU s/p amio drip now stable and stepped down to tele. LE Duplex (1/30) no DVT    LRD  No more TPN  Home meds as appropriate  Ceftriaxone(1/21-1/26)  Pain/nausea PRN  Amio/Metoprolol for AFib  Xarelto  Megace appetite stimulant  ISS  AM labs  Dispo: home hospice w/o TPN  DC PICC before discharge

## 2022-02-03 NOTE — PROGRESS NOTE ADULT - TIME BILLING
as noted above
-AF - h/o chronic AF, initially p/w AF w/ RVR i/s/o large bowel obstruction -> now s/p conversion to NSR on Amiodarone; Recommend c/w Amiodarone gtt - would eventually transition to 400 BID when able to tolerate enteral intake; A/C held i/s/o LBO; Pt w/ elevated CHADs-Vasc - but would need to weigh risk/benefit of long term A/C  -Pre-op risk assessment; Low-risk patient for intermediate to low risk procedure; No cardiac contraindications    [facility]  Cardiology Attending
as above
Emotional Support/Supportive Care and Exploration of GOC: discussion of code status and consequences of CPR/Intubation  Discharge Facilitation: coordination for home hospice
as above
Discharge Facilitation: introduction of home hospice services  Clarifying Potential Disease Trajectories: expected decline given malnutrition  Chart, labs, imaging reviewed. Discussed with daughter/HCP, SW, and Primary team.
Emotional Support/Supportive Care: comprehensive symptom assessment  Discharge Facilitation: discussion regarding home hospice  Clarifying Potential Disease Trajectories: life expectancy related to cancer vs dementia

## 2022-02-03 NOTE — PROGRESS NOTE ADULT - SUBJECTIVE AND OBJECTIVE BOX
SUBJECTIVE: Pt seen and examined by chief resident. Pt is doing well, resting comfortably on bed. No abdominal pain. No complaints at this time.    Vital Signs Last 24 Hrs  T(C): 36.7 (03 Feb 2022 05:02), Max: 36.8 (02 Feb 2022 09:31)  T(F): 98.1 (03 Feb 2022 05:02), Max: 98.3 (02 Feb 2022 09:31)  HR: 96 (03 Feb 2022 05:02) (90 - 104)  BP: 135/54 (03 Feb 2022 05:02) (103/55 - 141/83)  BP(mean): --  RR: 17 (03 Feb 2022 05:02) (16 - 18)  SpO2: 99% (03 Feb 2022 05:02) (96% - 100%)    I&O's Summary    02 Feb 2022 07:01  -  03 Feb 2022 07:00  --------------------------------------------------------  IN: 993.8 mL / OUT: 1725 mL / NET: -731.2 mL    Physical Exam:  General Appearance: Appears well, NAD  Pulmonary: Nonlabored breathing, no respiratory distress  Abdomen: Soft, nondisteded, nontender,   Extremities: WWP, SCD's in place     LABS:                        7.5    6.22  )-----------( 195      ( 02 Feb 2022 07:49 )             24.8     02-02    145  |  111<H>  |  14  ----------------------------<  126<H>  4.4   |  24  |  0.68    Ca    8.3<L>      02 Feb 2022 07:49  Phos  4.1     02-02  Mg     2.1     02-02

## 2022-02-03 NOTE — PROGRESS NOTE ADULT - PROBLEM SELECTOR PROBLEM 5
Advanced care planning/counseling discussion
Rectal mass
Advanced care planning/counseling discussion
Rectal mass
Rectal mass

## 2022-02-03 NOTE — PROGRESS NOTE ADULT - PROBLEM SELECTOR PLAN 5
.  s/p colonic stent with resolution of obstruction  -no evidence of metastatic disease on imaging  -surgical resection could be curative but Surg Onc and family are in agreement that patient is unlikely to tolerate intervention and has not shown the necessary progress to make future optimization realistic

## 2022-02-03 NOTE — PROGRESS NOTE ADULT - PROBLEM SELECTOR PLAN 3
.  PPSV = 40%, requires assistance for most ADLs  -PT Eval recommending CALE  -c/w supportive care, OOB, encourage movement
.  PPSV = 30%, requires total assistance for all ADLs  -c/w supportive care
.  PPSV = 40%, requires assistance for most ADLs  -PT Eval recommending CALE  -c/w supportive care, OOB, encourage movement
.  PPSV = 30%, requires total assistance for all ADLs  -c/w supportive care
.  PPSV = 30%, requires total assistance for all ADLs  -c/w supportive care

## 2022-02-03 NOTE — PROGRESS NOTE ADULT - PROBLEM SELECTOR PLAN 7
.  Patient is Full Code  -daughter (Blanca) is HCP, [phone]  -discussed coed status with HCP, she understands the recommendation for DNR/DNI and is in agreement but she says her sister is struggling with finalizing MOLST. Hospice can follow up in the community regarding MOLST completion

## 2022-02-03 NOTE — PROGRESS NOTE ADULT - PROBLEM SELECTOR PLAN 1
.  Can continue at home  -Zyprexa has synergistic effect with Megace for appetite stimulation/weight gain especially in cancer-related anorexia

## 2022-02-03 NOTE — PROGRESS NOTE ADULT - PROBLEM SELECTOR PLAN 8
.  Complex medical decision making / symptom management in the setting of advanced illness.    Plan for discharge tomorrow. Emotional support provided, questions answered.  Active Psychosocial Referrals:  [x]Social Work/Case management [x]PT/OT []Chaplaincy [x]Hospice  [x]Patient/Family Support []Holistic RN []Massage Therapy []Ethics  Coping: [x] well [] with difficulty [] poor coping [] unable to assess  Support system: [] strong [x] adequate [] inadequate    For new or uncontrolled symptoms, please call Palliative Care at [phone]. The service is available 24/7 (including nights & weekends) to provide symptom management recommendations over the phone as appropriate

## 2022-02-03 NOTE — PROGRESS NOTE ADULT - PROBLEM SELECTOR PROBLEM 4
Severe protein-calorie malnutrition
Severe protein-calorie malnutrition
Rectal mass
Rectal mass
Severe protein-calorie malnutrition

## 2022-02-03 NOTE — PROGRESS NOTE ADULT - ASSESSMENT
88 yo F PMH Dementia, Afib who presented with weakness, abdominal pain, and decreased appetite. Palliative Medicine consulted for complex decision making and to determin overall goals of care in setting of rectal mass.    ·	Remove PICC prior to discharge  ·	Keep johnson in place, Hospice will manage  ·	Discharge home with hospice tomorrow

## 2022-02-04 ENCOUNTER — TRANSCRIPTION ENCOUNTER (OUTPATIENT)
Age: 87
End: 2022-02-04

## 2022-02-04 VITALS
RESPIRATION RATE: 17 BRPM | TEMPERATURE: 99 F | DIASTOLIC BLOOD PRESSURE: 67 MMHG | HEART RATE: 83 BPM | SYSTOLIC BLOOD PRESSURE: 120 MMHG | OXYGEN SATURATION: 98 %

## 2022-02-04 PROCEDURE — 70450 CT HEAD/BRAIN W/O DYE: CPT

## 2022-02-04 PROCEDURE — 83880 ASSAY OF NATRIURETIC PEPTIDE: CPT

## 2022-02-04 PROCEDURE — 93306 TTE W/DOPPLER COMPLETE: CPT

## 2022-02-04 PROCEDURE — 97163 PT EVAL HIGH COMPLEX 45 MIN: CPT

## 2022-02-04 PROCEDURE — 85730 THROMBOPLASTIN TIME PARTIAL: CPT

## 2022-02-04 PROCEDURE — U0003: CPT

## 2022-02-04 PROCEDURE — 83605 ASSAY OF LACTIC ACID: CPT

## 2022-02-04 PROCEDURE — 84443 ASSAY THYROID STIM HORMONE: CPT

## 2022-02-04 PROCEDURE — 80048 BASIC METABOLIC PNL TOTAL CA: CPT

## 2022-02-04 PROCEDURE — 85027 COMPLETE CBC AUTOMATED: CPT

## 2022-02-04 PROCEDURE — 36573 INSJ PICC RS&I 5 YR+: CPT

## 2022-02-04 PROCEDURE — 87899 AGENT NOS ASSAY W/OPTIC: CPT

## 2022-02-04 PROCEDURE — 71260 CT THORAX DX C+: CPT

## 2022-02-04 PROCEDURE — 84466 ASSAY OF TRANSFERRIN: CPT

## 2022-02-04 PROCEDURE — 87449 NOS EACH ORGANISM AG IA: CPT

## 2022-02-04 PROCEDURE — 97530 THERAPEUTIC ACTIVITIES: CPT

## 2022-02-04 PROCEDURE — 72158 MRI LUMBAR SPINE W/O & W/DYE: CPT

## 2022-02-04 PROCEDURE — 84134 ASSAY OF PREALBUMIN: CPT

## 2022-02-04 PROCEDURE — 86901 BLOOD TYPING SEROLOGIC RH(D): CPT

## 2022-02-04 PROCEDURE — 82378 CARCINOEMBRYONIC ANTIGEN: CPT

## 2022-02-04 PROCEDURE — A9585: CPT

## 2022-02-04 PROCEDURE — 93970 EXTREMITY STUDY: CPT

## 2022-02-04 PROCEDURE — C1876: CPT

## 2022-02-04 PROCEDURE — 81003 URINALYSIS AUTO W/O SCOPE: CPT

## 2022-02-04 PROCEDURE — 86900 BLOOD TYPING SEROLOGIC ABO: CPT

## 2022-02-04 PROCEDURE — 84484 ASSAY OF TROPONIN QUANT: CPT

## 2022-02-04 PROCEDURE — 84100 ASSAY OF PHOSPHORUS: CPT

## 2022-02-04 PROCEDURE — 97535 SELF CARE MNGMENT TRAINING: CPT

## 2022-02-04 PROCEDURE — 82962 GLUCOSE BLOOD TEST: CPT

## 2022-02-04 PROCEDURE — 97161 PT EVAL LOW COMPLEX 20 MIN: CPT

## 2022-02-04 PROCEDURE — 36415 COLL VENOUS BLD VENIPUNCTURE: CPT

## 2022-02-04 PROCEDURE — 96374 THER/PROPH/DIAG INJ IV PUSH: CPT

## 2022-02-04 PROCEDURE — 93005 ELECTROCARDIOGRAM TRACING: CPT

## 2022-02-04 PROCEDURE — 99285 EMERGENCY DEPT VISIT HI MDM: CPT

## 2022-02-04 PROCEDURE — C1769: CPT

## 2022-02-04 PROCEDURE — 97110 THERAPEUTIC EXERCISES: CPT

## 2022-02-04 PROCEDURE — 86850 RBC ANTIBODY SCREEN: CPT

## 2022-02-04 PROCEDURE — 81001 URINALYSIS AUTO W/SCOPE: CPT

## 2022-02-04 PROCEDURE — 80053 COMPREHEN METABOLIC PANEL: CPT

## 2022-02-04 PROCEDURE — 82607 VITAMIN B-12: CPT

## 2022-02-04 PROCEDURE — 80061 LIPID PANEL: CPT

## 2022-02-04 PROCEDURE — 84478 ASSAY OF TRIGLYCERIDES: CPT

## 2022-02-04 PROCEDURE — 85025 COMPLETE CBC W/AUTO DIFF WBC: CPT

## 2022-02-04 PROCEDURE — 87040 BLOOD CULTURE FOR BACTERIA: CPT

## 2022-02-04 PROCEDURE — 87635 SARS-COV-2 COVID-19 AMP PRB: CPT

## 2022-02-04 PROCEDURE — 83036 HEMOGLOBIN GLYCOSYLATED A1C: CPT

## 2022-02-04 PROCEDURE — 92610 EVALUATE SWALLOWING FUNCTION: CPT

## 2022-02-04 PROCEDURE — 83735 ASSAY OF MAGNESIUM: CPT

## 2022-02-04 PROCEDURE — 85379 FIBRIN DEGRADATION QUANT: CPT

## 2022-02-04 PROCEDURE — U0005: CPT

## 2022-02-04 PROCEDURE — 0225U NFCT DS DNA&RNA 21 SARSCOV2: CPT

## 2022-02-04 PROCEDURE — 86780 TREPONEMA PALLIDUM: CPT

## 2022-02-04 PROCEDURE — 82040 ASSAY OF SERUM ALBUMIN: CPT

## 2022-02-04 PROCEDURE — 71045 X-RAY EXAM CHEST 1 VIEW: CPT

## 2022-02-04 PROCEDURE — 74177 CT ABD & PELVIS W/CONTRAST: CPT

## 2022-02-04 PROCEDURE — 85610 PROTHROMBIN TIME: CPT

## 2022-02-04 PROCEDURE — 97116 GAIT TRAINING THERAPY: CPT

## 2022-02-04 RX ORDER — RIVAROXABAN 15 MG-20MG
1 KIT ORAL
Qty: 30 | Refills: 0
Start: 2022-02-04 | End: 2022-03-05

## 2022-02-04 RX ORDER — RIVAROXABAN 15 MG-20MG
0 KIT ORAL
Qty: 0 | Refills: 3 | DISCHARGE

## 2022-02-04 RX ORDER — AMIODARONE HYDROCHLORIDE 400 MG/1
1 TABLET ORAL
Qty: 30 | Refills: 0
Start: 2022-02-04 | End: 2022-03-05

## 2022-02-04 RX ORDER — OLANZAPINE 15 MG/1
1 TABLET, FILM COATED ORAL
Qty: 0 | Refills: 0 | DISCHARGE
Start: 2022-02-04

## 2022-02-04 RX ORDER — OLANZAPINE 15 MG/1
1 TABLET, FILM COATED ORAL
Qty: 30 | Refills: 0
Start: 2022-02-04 | End: 2022-03-05

## 2022-02-04 RX ORDER — LATANOPROST 0.05 MG/ML
1 SOLUTION/ DROPS OPHTHALMIC; TOPICAL
Qty: 30 | Refills: 0
Start: 2022-02-04 | End: 2022-03-05

## 2022-02-04 RX ORDER — AMIODARONE HYDROCHLORIDE 400 MG/1
1 TABLET ORAL
Qty: 0 | Refills: 0 | DISCHARGE
Start: 2022-02-04

## 2022-02-04 RX ORDER — LATANOPROST 0.05 MG/ML
1 SOLUTION/ DROPS OPHTHALMIC; TOPICAL
Qty: 0 | Refills: 0 | DISCHARGE
Start: 2022-02-04

## 2022-02-04 RX ORDER — MEGESTROL ACETATE 40 MG/ML
20 SUSPENSION ORAL
Qty: 600 | Refills: 0
Start: 2022-02-04 | End: 2022-03-05

## 2022-02-04 RX ORDER — MEGESTROL ACETATE 40 MG/ML
20 SUSPENSION ORAL
Qty: 0 | Refills: 0 | DISCHARGE
Start: 2022-02-04

## 2022-02-04 RX ADMIN — AMIODARONE HYDROCHLORIDE 200 MILLIGRAM(S): 400 TABLET ORAL at 05:17

## 2022-02-04 RX ADMIN — CHLORHEXIDINE GLUCONATE 1 APPLICATION(S): 213 SOLUTION TOPICAL at 05:16

## 2022-02-04 RX ADMIN — Medication 50 MILLIGRAM(S): at 05:17

## 2022-02-04 NOTE — PROGRESS NOTE ADULT - PROVIDER SPECIALTY LIST ADULT
Cardiology
Cardiology
Gastroenterology
Neurosurgery
Palliative Care
Palliative Care
Surgery
Cardiology
Colorectal Surgery
Colorectal Surgery
Gastroenterology
SICU
Surgery
Surgery
Cardiology
Colorectal Surgery
Colorectal Surgery
Gastroenterology
Gastroenterology
Hospitalist
Hospitalist
Palliative Care
SICU
Surgery
Surgery
Colorectal Surgery
Gastroenterology
Hospitalist
Hospitalist
SICU
Surgery
Hospitalist
Palliative Care
Palliative Care

## 2022-02-04 NOTE — PROGRESS NOTE ADULT - SUBJECTIVE AND OBJECTIVE BOX
STATUS POST:       SUBJECTIVE: Pt seen and examined by chief resident. Pt is doing well, resting comfortably on bed. No abdominal pain. No complaints at this time.    aMIOdarone    Tablet 200 milliGRAM(s) Oral daily  metoprolol succinate ER 50 milliGRAM(s) Oral daily  rivaroxaban 15 milliGRAM(s) Oral with dinner      Vital Signs Last 24 Hrs  T(C): 36.4 (04 Feb 2022 05:08), Max: 36.9 (03 Feb 2022 17:01)  T(F): 97.5 (04 Feb 2022 05:08), Max: 98.4 (03 Feb 2022 17:01)  HR: 92 (04 Feb 2022 05:08) (84 - 92)  BP: 105/59 (04 Feb 2022 05:08) (100/65 - 120/70)  BP(mean): --  RR: 18 (04 Feb 2022 05:08) (17 - 18)  SpO2: 97% (04 Feb 2022 05:08) (95% - 99%)  I&O's Detail    03 Feb 2022 07:01  -  04 Feb 2022 07:00  --------------------------------------------------------  IN:    Fat Emulsion (Fish Oil &amp; Plant Based) 20% Infusion: 41.6 mL    Oral Fluid: 440 mL    TPN (Total Parenteral Nutrition): 75 mL  Total IN: 556.6 mL    OUT:    Indwelling Catheter - Urethral (mL): 1650 mL    Stool (mL): 2 mL  Total OUT: 1652 mL    Total NET: -1095.4 mL          Physical Exam:  General Appearance: Appears well, NAD  Pulmonary: Nonlabored breathing, no respiratory distress  Abdomen: Soft, nondisteded, nontender,   Extremities: WWP, SCD's in place     LABS:                        7.5    6.22  )-----------( 195      ( 02 Feb 2022 07:49 )             24.8     02-02    145  |  111<H>  |  14  ----------------------------<  126<H>  4.4   |  24  |  0.68    Ca    8.3<L>      02 Feb 2022 07:49  Phos  4.1     02-02  Mg     2.1     02-02            RADIOLOGY & ADDITIONAL STUDIES:    88 yo female PMH of dementia, Afib, PPM (Indicated for tachy-yudiht syndrome), cerebral LICA paraclinoid aneurysm (s/p stenting) presented with LBO w/ obstructing rectal mass now s/p rectal stent (1/21). Course c/b a-fib with RVR transferred to SICU s/p amio drip now stable and stepped down to tele. LE Duplex (1/30) no DVT    LRD  No more TPN  Home meds as appropriate  Ceftriaxone(1/21-1/26)  Pain/nausea PRN  Amio/Metoprolol for AFib  Xarelto  Megace appetite stimulant  ISS  Dispo: home hospice w/o TPN

## 2022-02-04 NOTE — DISCHARGE NOTE NURSING/CASE MANAGEMENT/SOCIAL WORK - NSDCPEFALRISK_GEN_ALL_CORE
For information on Fall & Injury Prevention, visit: [email or web address] OR  [email or web address] OR  https://www.cdc.gov/steadi/patient.html

## 2022-02-04 NOTE — DISCHARGE NOTE NURSING/CASE MANAGEMENT/SOCIAL WORK - NSDCPETBCESMAN_GEN_ALL_CORE
IMPRESSION:  SOb most likely CHF exacerbation   NStemi   ?? PNA       PLAN:    CNS: no sedation     HEENT: oral care     PULMONARY: keep pox > 92 %   nebulizer Q 4 hrs and prn     CARDIOVASCULAR: echo seen by cardiology ?? cardiac cath today   consider diuresis   keep is < os     GI: GI prophylaxis.      RENAL: follow is and os lytes     INFECTIOUS DISEASE: continue abx for now follow ID   procal   please check RVP     HEMATOLOGICAL:  DVT prophylaxis.    ENDOCRINE:  Follow up FS.  Insulin protocol if needed.  icu monitoring         CRITICAL CARE TIME SPENT: *** If you are a smoker, it is important for your health to stop smoking. Please be aware that second hand smoke is also harmful.

## 2022-02-04 NOTE — PROGRESS NOTE ADULT - SUBJECTIVE AND OBJECTIVE BOX
No complaints, eating some.  AFVSS   Abd soft, ND NT    A/P: Rectosigmoid cancer with obstruction, s/p stent.  Treated RLL pneumonia.  Afib controlled. Severe malnutrition, not a surgical candidate.  1. Planning for home hospice.  2. Low residue diet.

## 2022-02-04 NOTE — PROGRESS NOTE ADULT - NUTRITIONAL ASSESSMENT
This patient has been assessed with a concern for Malnutrition and has been determined to have a diagnosis/diagnoses of Severe protein-calorie malnutrition and Underweight (BMI < 19).    This patient is being managed with:   fat emulsion (Fish Oil and Plant Based) 20% Infusion-[Known as SMOFLIPID 20% Infusion]  50 Gram(s) in IV Solution 250 milliLiter(s) infuse at 20.8 mL/Hr  Dose Rate: 1.182 Gm/kG/Day Infuse Over: 12 Hours; Stop After 12 Hours  Administration Instructions: Use 1.2 micron in-line filter  Entered: Jan 26 2022 10:00PM    Parenteral Nutrition - Adult-  Entered: Jan 26 2022  5:00PM    Diet NPO after Midnight-     NPO Start Date: 26-Jan-2022   NPO Start Time: 23:59  Entered: Jan 26 2022  6:00AM    Diet Clear Liquid-  Supplement Feeding Modality:  Oral  Ensure Clear Cans or Servings Per Day:  1       Frequency:  Daily  Entered: Jan 25 2022  5:39PM    
This patient has been assessed with a concern for Malnutrition and has been determined to have a diagnosis/diagnoses of Severe protein-calorie malnutrition and Underweight (BMI < 19).    This patient is being managed with:   fat emulsion (Fish Oil and Plant Based) 20% Infusion-[Known as SMOFLIPID 20% Infusion]  50 Gram(s) in IV Solution 250 milliLiter(s) infuse at 20.8 mL/Hr  Dose Rate: 1.182 Gm/kG/Day Infuse Over: 12 Hours; Stop After 12 Hours  Administration Instructions: Use 1.2 micron in-line filter  Entered: Jan 27 2022 10:00PM    Parenteral Nutrition - Adult-  Entered: Jan 27 2022  5:00PM    Diet Low Fiber-  Entered: Jan 27 2022 11:00AM    Diet NPO after Midnight-     NPO Start Date: 26-Jan-2022   NPO Start Time: 23:59  Entered: Jan 26 2022  6:00AM    
This patient has been assessed with a concern for Malnutrition and has been determined to have a diagnosis/diagnoses of Severe protein-calorie malnutrition and Underweight (BMI < 19).    This patient is being managed with:   fat emulsion (Fish Oil and Plant Based) 20% Infusion-[Known as SMOFLIPID 20% Infusion]  50 Gram(s) in IV Solution 250 milliLiter(s) infuse at 20.8 mL/Hr  Dose Rate: 1.182 Gm/kG/Day Infuse Over: 12 Hours; Stop After 12 Hours  Administration Instructions: Use 1.2 micron in-line filter  Entered: Jan 31 2022 10:00PM    fat emulsion (Fish Oil and Plant Based) 20% Infusion-[Known as SMOFLIPID 20% Infusion]  50 Gram(s) in IV Solution 250 milliLiter(s) infuse at 20.8 mL/Hr  Dose Rate: 1.182 Gm/kG/Day Infuse Over: 12 Hours; Stop After 12 Hours  Administration Instructions: Use 1.2 micron in-line filter  Entered: Jan 30 2022 10:00PM    Parenteral Nutrition - Adult-  Entered: Jan 30 2022  5:00PM    Diet Low Fiber-  Supplement Feeding Modality:  Oral  Ensure Max Cans or Servings Per Day:  1       Frequency:  Three Times a day  Entered: Jan 30 2022  6:08AM    
This patient has been assessed with a concern for Malnutrition and has been determined to have a diagnosis/diagnoses of Severe protein-calorie malnutrition and Underweight (BMI < 19).    This patient is being managed with:   fat emulsion (Fish Oil and Plant Based) 20% Infusion-[Known as SMOFLIPID 20% Infusion]  50 Gram(s) in IV Solution 250 milliLiter(s) infuse at 20.83 mL/Hr  Dose Rate: 1.182 Gm/kG/Day Infuse Over: 12 Hours; Stop After 12 Hours  Administration Instructions: Use 1.2 micron in-line filter  Entered: Feb 1 2022 10:00PM    Parenteral Nutrition - Adult-  Entered: Feb 1 2022  5:00PM    Diet Low Fiber-  Supplement Feeding Modality:  Oral  Ensure Enlive Cans or Servings Per Day:  1       Frequency:  Two Times a day  Ensure Pudding Cans or Servings Per Day:  1       Frequency:  Daily  Entered: Feb 1 2022  2:30PM    Diet Low Fiber-  Supplement Feeding Modality:  Oral  Ensure Enlive Cans or Servings Per Day:  1       Frequency:  Two Times a day  Ensure Pudding Cans or Servings Per Day:  1       Frequency:  Daily  Entered: Jan 31 2022  2:44PM    The following pending diet order is being considered for treatment of Severe protein-calorie malnutrition and Underweight (BMI < 19):null
This patient has been assessed with a concern for Malnutrition and has been determined to have a diagnosis/diagnoses of Severe protein-calorie malnutrition and Underweight (BMI < 19).    This patient is being managed with:   fat emulsion (Fish Oil and Plant Based) 20% Infusion-[Known as SMOFLIPID 20% Infusion]  50 Gram(s) in IV Solution 250 milliLiter(s) infuse at 20.83 mL/Hr  Dose Rate: 1.182 Gm/kG/Day Infuse Over: 12 Hours; Stop After 12 Hours  Administration Instructions: Use 1.2 micron in-line filter  Entered: Feb 2 2022 10:00PM    Parenteral Nutrition - Adult-  Entered: Feb 2 2022  5:00PM    Diet Low Fiber-  Supplement Feeding Modality:  Oral  Ensure Enlive Cans or Servings Per Day:  1       Frequency:  Two Times a day  Ensure Pudding Cans or Servings Per Day:  1       Frequency:  Daily  Entered: Feb 1 2022  2:30PM    Diet Low Fiber-  Supplement Feeding Modality:  Oral  Ensure Enlive Cans or Servings Per Day:  1       Frequency:  Two Times a day  Ensure Pudding Cans or Servings Per Day:  1       Frequency:  Daily  Entered: Jan 31 2022  2:44PM    The following pending diet order is being considered for treatment of Severe protein-calorie malnutrition and Underweight (BMI < 19):null
This patient has been assessed with a concern for Malnutrition and has been determined to have a diagnosis/diagnoses of Severe protein-calorie malnutrition and Underweight (BMI < 19).    This patient is being managed with:   Diet NPO-  Entered: Jan 20 2022  9:53PM    
This patient has been assessed with a concern for Malnutrition and has been determined to have a diagnosis/diagnoses of Severe protein-calorie malnutrition and Underweight (BMI < 19).    This patient is being managed with:   fat emulsion (Fish Oil and Plant Based) 20% Infusion-[Known as SMOFLIPID 20% Infusion]  50 Gram(s) in IV Solution 250 milliLiter(s) infuse at 20.8 mL/Hr  Dose Rate: 1.182 Gm/kG/Day Infuse Over: 12 Hours; Stop After 12 Hours  Administration Instructions: Use 1.2 micron in-line filter  Entered: Jan 31 2022 10:00PM    Parenteral Nutrition - Adult-  Entered: Jan 31 2022  5:00PM    Diet Low Fiber-  Supplement Feeding Modality:  Oral  Ensure Enlive Cans or Servings Per Day:  1       Frequency:  Two Times a day  Ensure Pudding Cans or Servings Per Day:  1       Frequency:  Daily  Entered: Jan 31 2022  2:44PM    Diet Low Fiber-  Supplement Feeding Modality:  Oral  Ensure Max Cans or Servings Per Day:  1       Frequency:  Three Times a day  Entered: Jan 30 2022  6:08AM    The following pending diet order is being considered for treatment of Severe protein-calorie malnutrition and Underweight (BMI < 19):null
This patient has been assessed with a concern for Malnutrition and has been determined to have a diagnosis/diagnoses of Severe protein-calorie malnutrition and Underweight (BMI < 19).    This patient is being managed with:   fat emulsion (Fish Oil and Plant Based) 20% Infusion-[Known as SMOFLIPID 20% Infusion]  50 Gram(s) in IV Solution 250 milliLiter(s) infuse at 20.8 mL/Hr  Dose Rate: 1.182 Gm/kG/Day Infuse Over: 12 Hours; Stop After 12 Hours  Administration Instructions: Use 1.2 micron in-line filter  Entered: Jan 30 2022 10:00PM    Parenteral Nutrition - Adult-  Entered: Jan 30 2022  5:00PM    Diet Low Fiber-  Supplement Feeding Modality:  Oral  Ensure Max Cans or Servings Per Day:  1       Frequency:  Three Times a day  Entered: Jan 30 2022  6:08AM    
This patient has been assessed with a concern for Malnutrition and has been determined to have a diagnosis/diagnoses of Severe protein-calorie malnutrition and Underweight (BMI < 19).    This patient is being managed with:   fat emulsion (Fish Oil and Plant Based) 20% Infusion-[Known as SMOFLIPID 20% Infusion]  50 Gram(s) in IV Solution 250 milliLiter(s) infuse at 20.83 mL/Hr  Dose Rate: 1.182 Gm/kG/Day Infuse Over: 12 Hours; Stop After 12 Hours  Administration Instructions: Use 1.2 micron in-line filter  Entered: Feb 1 2022 10:00PM    Parenteral Nutrition - Adult-  Entered: Feb 1 2022  5:00PM    Diet Low Fiber-  Supplement Feeding Modality:  Oral  Ensure Enlive Cans or Servings Per Day:  1       Frequency:  Two Times a day  Ensure Pudding Cans or Servings Per Day:  1       Frequency:  Daily  Entered: Feb 1 2022  2:30PM    fat emulsion (Fish Oil and Plant Based) 20% Infusion-[Known as SMOFLIPID 20% Infusion]  50 Gram(s) in IV Solution 250 milliLiter(s) infuse at 20.8 mL/Hr  Dose Rate: 1.182 Gm/kG/Day Infuse Over: 12 Hours; Stop After 12 Hours  Administration Instructions: Use 1.2 micron in-line filter  Entered: Jan 31 2022 10:00PM    Parenteral Nutrition - Adult-  Entered: Jan 31 2022  5:00PM    Diet Low Fiber-  Supplement Feeding Modality:  Oral  Ensure Enlive Cans or Servings Per Day:  1       Frequency:  Two Times a day  Ensure Pudding Cans or Servings Per Day:  1       Frequency:  Daily  Entered: Jan 31 2022  2:44PM    The following pending diet order is being considered for treatment of Severe protein-calorie malnutrition and Underweight (BMI < 19):null
This patient has been assessed with a concern for Malnutrition and has been determined to have a diagnosis/diagnoses of Severe protein-calorie malnutrition and Underweight (BMI < 19).    This patient is being managed with:   fat emulsion (Fish Oil and Plant Based) 20% Infusion-[Known as SMOFLIPID 20% Infusion]  50 Gram(s) in IV Solution 250 milliLiter(s) infuse at 21 mL/Hr  Dose Rate: 1.19 Gm/kG/Day Infuse Over: 12 Hours; Stop After 12 Hours  Administration Instructions: Use 1.2 micron in-line filter  Entered: Jan 24 2022 10:00PM    Parenteral Nutrition - Adult-  Entered: Jan 24 2022  5:00PM    Diet NPO-  Except Medications  With Ice Chips/Sips of Water  Entered: Jan 23 2022  2:44PM    
This patient has been assessed with a concern for Malnutrition and has been determined to have a diagnosis/diagnoses of Underweight (BMI < 19) and Severe protein-calorie malnutrition.    This patient is being managed with:   fat emulsion (Fish Oil and Plant Based) 20% Infusion-[Known as SMOFLIPID 20% Infusion]  50 Gram(s) in IV Solution 250 milliLiter(s) infuse at 20.8 mL/Hr  Dose Rate: 1.182 Gm/kG/Day Infuse Over: 12 Hours; Stop After 12 Hours  Administration Instructions: Use 1.2 micron in-line filter  Entered: Jan 30 2022 10:00PM    Parenteral Nutrition - Adult-  Entered: Jan 30 2022  5:00PM    Diet Low Fiber-  Supplement Feeding Modality:  Oral  Ensure Max Cans or Servings Per Day:  1       Frequency:  Three Times a day  Entered: Jan 30 2022  6:08AM    fat emulsion (Fish Oil and Plant Based) 20% Infusion-[Known as SMOFLIPID 20% Infusion]  50 Gram(s) in IV Solution 250 milliLiter(s) infuse at 20.8 mL/Hr  Dose Rate: 1.182 Gm/kG/Day Infuse Over: 12 Hours; Stop After 12 Hours  Administration Instructions: Use 1.2 micron in-line filter  Entered: Jan 29 2022 10:00PM    Parenteral Nutrition - Adult-  Entered: Jan 29 2022  5:00PM    
This patient has been assessed with a concern for Malnutrition and has been determined to have a diagnosis/diagnoses of Severe protein-calorie malnutrition and Underweight (BMI < 19).    This patient is being managed with:   Diet Low Fiber-  Supplement Feeding Modality:  Oral  Ensure Enlive Cans or Servings Per Day:  1       Frequency:  Two Times a day  Ensure Pudding Cans or Servings Per Day:  1       Frequency:  Daily  Entered: Feb 1 2022  2:30PM    Diet Low Fiber-  Supplement Feeding Modality:  Oral  Ensure Enlive Cans or Servings Per Day:  1       Frequency:  Two Times a day  Ensure Pudding Cans or Servings Per Day:  1       Frequency:  Daily  Entered: Jan 31 2022  2:44PM    The following pending diet order is being considered for treatment of Severe protein-calorie malnutrition and Underweight (BMI < 19):null
This patient has been assessed with a concern for Malnutrition and has been determined to have a diagnosis/diagnoses of Severe protein-calorie malnutrition and Underweight (BMI < 19).    This patient is being managed with:   Diet Low Fiber-  Supplement Feeding Modality:  Oral  Ensure Enlive Cans or Servings Per Day:  1       Frequency:  Two Times a day  Ensure Pudding Cans or Servings Per Day:  1       Frequency:  Daily  Entered: Feb 1 2022  2:30PM    Diet Low Fiber-  Supplement Feeding Modality:  Oral  Ensure Enlive Cans or Servings Per Day:  1       Frequency:  Two Times a day  Ensure Pudding Cans or Servings Per Day:  1       Frequency:  Daily  Entered: Jan 31 2022  2:44PM    The following pending diet order is being considered for treatment of Severe protein-calorie malnutrition and Underweight (BMI < 19):null
This patient has been assessed with a concern for Malnutrition and has been determined to have a diagnosis/diagnoses of Severe protein-calorie malnutrition and Underweight (BMI < 19).    This patient is being managed with:   Diet NPO after Midnight-     NPO Start Date: 26-Jan-2022   NPO Start Time: 23:59  Entered: Jan 26 2022  6:00AM    Diet Clear Liquid-  Supplement Feeding Modality:  Oral  Ensure Clear Cans or Servings Per Day:  1       Frequency:  Daily  Entered: Jan 25 2022  5:39PM    fat emulsion (Fish Oil and Plant Based) 20% Infusion-[Known as SMOFLIPID 20% Infusion]  50 Gram(s) in IV Solution 250 milliLiter(s) infuse at 20.83 mL/Hr  Dose Rate: 1.182 Gm/kG/Day Infuse Over: 12 Hours; Stop After 12 Hours  Administration Instructions: Use 1.2 micron in-line filter  Entered: Jan 25 2022  5:00PM    Parenteral Nutrition - Adult-  Entered: Jan 25 2022  5:00PM    
This patient has been assessed with a concern for Malnutrition and has been determined to have a diagnosis/diagnoses of Severe protein-calorie malnutrition and Underweight (BMI < 19).    This patient is being managed with:   Parenteral Nutrition - Adult-  Entered: Jan 24 2022  5:00PM    fat emulsion (Fish Oil and Plant Based) 20% Infusion-[Known as SMOFLIPID 20% Infusion]  50.34 Gram(s) in IV Solution 251.7 milliLiter(s) infuse at 21 mL/Hr  Dose Rate: 1.19 Gm/kG/Day Infuse Over: 12 Hours; Stop After 12 Hours  Administration Instructions: Use 1.2 micron in-line filter  Entered: Jan 24 2022  5:00PM    Diet NPO-  Except Medications  With Ice Chips/Sips of Water  Entered: Jan 23 2022  2:44PM    
This patient has been assessed with a concern for Malnutrition and has been determined to have a diagnosis/diagnoses of Severe protein-calorie malnutrition and Underweight (BMI < 19).    This patient is being managed with:   fat emulsion (Fish Oil and Plant Based) 20% Infusion-[Known as SMOFLIPID 20% Infusion]  50 Gram(s) in IV Solution 250 milliLiter(s) infuse at 20.8 mL/Hr  Dose Rate: 1.182 Gm/kG/Day Infuse Over: 12 Hours; Stop After 12 Hours  Administration Instructions: Use 1.2 micron in-line filter  Entered: Jan 28 2022 10:00PM    Parenteral Nutrition - Adult-  Entered: Jan 28 2022  5:00PM    fat emulsion (Fish Oil and Plant Based) 20% Infusion-[Known as SMOFLIPID 20% Infusion]  50 Gram(s) in IV Solution 250 milliLiter(s) infuse at 20.8 mL/Hr  Dose Rate: 1.182 Gm/kG/Day Infuse Over: 12 Hours; Stop After 12 Hours  Administration Instructions: Use 1.2 micron in-line filter  Entered: Jan 27 2022 10:00PM    Parenteral Nutrition - Adult-  Entered: Jan 27 2022  5:00PM    Diet Low Fiber-  Entered: Jan 27 2022 11:00AM    
This patient has been assessed with a concern for Malnutrition and has been determined to have a diagnosis/diagnoses of Severe protein-calorie malnutrition and Underweight (BMI < 19).    This patient is being managed with:   fat emulsion (Fish Oil and Plant Based) 20% Infusion-[Known as SMOFLIPID 20% Infusion]  50 Gram(s) in IV Solution 250 milliLiter(s) infuse at 20.8 mL/Hr  Dose Rate: 1.182 Gm/kG/Day Infuse Over: 12 Hours; Stop After 12 Hours  Administration Instructions: Use 1.2 micron in-line filter  Entered: Jan 29 2022 10:00PM    Parenteral Nutrition - Adult-  Entered: Jan 29 2022  5:00PM    fat emulsion (Fish Oil and Plant Based) 20% Infusion-[Known as SMOFLIPID 20% Infusion]  50 Gram(s) in IV Solution 250 milliLiter(s) infuse at 20.8 mL/Hr  Dose Rate: 1.182 Gm/kG/Day Infuse Over: 12 Hours; Stop After 12 Hours  Administration Instructions: Use 1.2 micron in-line filter  Entered: Jan 28 2022 10:00PM    Parenteral Nutrition - Adult-  Entered: Jan 28 2022  5:00PM    Diet Low Fiber-  Entered: Jan 27 2022 11:00AM    
This patient has been assessed with a concern for Malnutrition and has been determined to have a diagnosis/diagnoses of Severe protein-calorie malnutrition and Underweight (BMI < 19).    This patient is being managed with:   fat emulsion (Fish Oil and Plant Based) 20% Infusion-[Known as SMOFLIPID 20% Infusion]  50 Gram(s) in IV Solution 250 milliLiter(s) infuse at 20.8 mL/Hr  Dose Rate: 1.182 Gm/kG/Day Infuse Over: 12 Hours; Stop After 12 Hours  Administration Instructions: Use 1.2 micron in-line filter  Entered: Jan 29 2022 10:00PM    Parenteral Nutrition - Adult-  Entered: Jan 29 2022  5:00PM    fat emulsion (Fish Oil and Plant Based) 20% Infusion-[Known as SMOFLIPID 20% Infusion]  50 Gram(s) in IV Solution 250 milliLiter(s) infuse at 20.8 mL/Hr  Dose Rate: 1.182 Gm/kG/Day Infuse Over: 12 Hours; Stop After 12 Hours  Administration Instructions: Use 1.2 micron in-line filter  Entered: Jan 28 2022 10:00PM    Parenteral Nutrition - Adult-  Entered: Jan 28 2022  5:00PM    Diet Low Fiber-  Entered: Jan 27 2022 11:00AM    
This patient has been assessed with a concern for Malnutrition and has been determined to have a diagnosis/diagnoses of Severe protein-calorie malnutrition and Underweight (BMI < 19).    This patient is being managed with:   fat emulsion (Fish Oil and Plant Based) 20% Infusion-[Known as SMOFLIPID 20% Infusion]  50 Gram(s) in IV Solution 250 milliLiter(s) infuse at 21 mL/Hr  Dose Rate: 1.19 Gm/kG/Day Infuse Over: 12 Hours; Stop After 12 Hours  Administration Instructions: Use 1.2 micron in-line filter  Entered: Jan 24 2022 10:00PM    Parenteral Nutrition - Adult-  Entered: Jan 24 2022  5:00PM    Diet NPO-  Except Medications  With Ice Chips/Sips of Water  Entered: Jan 23 2022  2:44PM    
This patient has been assessed with a concern for Malnutrition and has been determined to have a diagnosis/diagnoses of Severe protein-calorie malnutrition and Underweight (BMI < 19).    This patient is being managed with:   Diet NPO-  With Ice Chips/Sips of Water  Entered: Jan 23 2022 10:31AM

## 2022-02-04 NOTE — DISCHARGE NOTE NURSING/CASE MANAGEMENT/SOCIAL WORK - NSDCVIVACCINE_GEN_ALL_CORE_FT
Influenza, high dose seasonal; 03-Mar-2018 13:57; Tiara Avery (RN); [employer]; [id number]; IntraMuscular; Deltoid Left.; 0.5 milliLiter(s); VIS (VIS Published: 07-Aug-2015, VIS Presented: 03-Mar-2018);

## 2022-02-04 NOTE — DISCHARGE NOTE NURSING/CASE MANAGEMENT/SOCIAL WORK - PATIENT PORTAL LINK FT
You can access the FollowMyHealth Patient Portal offered by [facility] by registering at the following website: [email or web address]. By joining [facility]’s FollowMyHealth portal, you will also be able to view your health information using other applications (apps) compatible with our system.

## 2022-02-07 DIAGNOSIS — R53.2 FUNCTIONAL QUADRIPLEGIA: ICD-10-CM

## 2022-02-07 DIAGNOSIS — H40.9 UNSPECIFIED GLAUCOMA: ICD-10-CM

## 2022-02-07 DIAGNOSIS — I67.1 CEREBRAL ANEURYSM, NONRUPTURED: ICD-10-CM

## 2022-02-07 DIAGNOSIS — Z87.828 PERSONAL HISTORY OF OTHER (HEALED) PHYSICAL INJURY AND TRAUMA: ICD-10-CM

## 2022-02-07 DIAGNOSIS — Z90.49 ACQUIRED ABSENCE OF OTHER SPECIFIED PARTS OF DIGESTIVE TRACT: ICD-10-CM

## 2022-02-07 DIAGNOSIS — R15.9 FULL INCONTINENCE OF FECES: ICD-10-CM

## 2022-02-07 DIAGNOSIS — M48.56XA COLLAPSED VERTEBRA, NOT ELSEWHERE CLASSIFIED, LUMBAR REGION, INITIAL ENCOUNTER FOR FRACTURE: ICD-10-CM

## 2022-02-07 DIAGNOSIS — R33.9 RETENTION OF URINE, UNSPECIFIED: ICD-10-CM

## 2022-02-07 DIAGNOSIS — R45.1 RESTLESSNESS AND AGITATION: ICD-10-CM

## 2022-02-07 DIAGNOSIS — J18.9 PNEUMONIA, UNSPECIFIED ORGANISM: ICD-10-CM

## 2022-02-07 DIAGNOSIS — Z51.5 ENCOUNTER FOR PALLIATIVE CARE: ICD-10-CM

## 2022-02-07 DIAGNOSIS — Z79.01 LONG TERM (CURRENT) USE OF ANTICOAGULANTS: ICD-10-CM

## 2022-02-07 DIAGNOSIS — Z95.828 PRESENCE OF OTHER VASCULAR IMPLANTS AND GRAFTS: ICD-10-CM

## 2022-02-07 DIAGNOSIS — Z90.710 ACQUIRED ABSENCE OF BOTH CERVIX AND UTERUS: ICD-10-CM

## 2022-02-07 DIAGNOSIS — E43 UNSPECIFIED SEVERE PROTEIN-CALORIE MALNUTRITION: ICD-10-CM

## 2022-02-07 DIAGNOSIS — G93.41 METABOLIC ENCEPHALOPATHY: ICD-10-CM

## 2022-02-07 DIAGNOSIS — Z20.828 CONTACT WITH AND (SUSPECTED) EXPOSURE TO OTHER VIRAL COMMUNICABLE DISEASES: ICD-10-CM

## 2022-02-07 DIAGNOSIS — E83.39 OTHER DISORDERS OF PHOSPHORUS METABOLISM: ICD-10-CM

## 2022-02-07 DIAGNOSIS — F03.90 UNSPECIFIED DEMENTIA WITHOUT BEHAVIORAL DISTURBANCE: ICD-10-CM

## 2022-02-07 DIAGNOSIS — E87.6 HYPOKALEMIA: ICD-10-CM

## 2022-02-07 DIAGNOSIS — Z78.1 PHYSICAL RESTRAINT STATUS: ICD-10-CM

## 2022-02-07 DIAGNOSIS — I48.91 UNSPECIFIED ATRIAL FIBRILLATION: ICD-10-CM

## 2022-02-07 DIAGNOSIS — C19 MALIGNANT NEOPLASM OF RECTOSIGMOID JUNCTION: ICD-10-CM

## 2022-02-07 DIAGNOSIS — Z95.0 PRESENCE OF CARDIAC PACEMAKER: ICD-10-CM

## 2022-02-07 DIAGNOSIS — R53.1 WEAKNESS: ICD-10-CM

## 2022-02-07 DIAGNOSIS — M19.90 UNSPECIFIED OSTEOARTHRITIS, UNSPECIFIED SITE: ICD-10-CM

## 2022-02-15 ENCOUNTER — APPOINTMENT (OUTPATIENT)
Dept: INTERNAL MEDICINE | Facility: CLINIC | Age: 87
End: 2022-02-15

## 2022-02-25 RX ORDER — RIVAROXABAN 15 MG/1
15 TABLET, FILM COATED ORAL
Qty: 30 | Refills: 0 | Status: ACTIVE | COMMUNITY
Start: 2021-12-16 | End: 1900-01-01

## 2022-04-08 ENCOUNTER — APPOINTMENT (OUTPATIENT)
Dept: INTERNAL MEDICINE | Facility: CLINIC | Age: 87
End: 2022-04-08

## 2022-04-21 RX ORDER — MEGESTROL ACETATE 20 MG/1
20 TABLET ORAL DAILY
Qty: 30 | Refills: 0 | Status: ACTIVE | COMMUNITY
Start: 2022-02-25 | End: 1900-01-01

## 2022-05-03 ENCOUNTER — APPOINTMENT (OUTPATIENT)
Dept: HEART AND VASCULAR | Facility: CLINIC | Age: 87
End: 2022-05-03

## 2022-05-05 ENCOUNTER — FORM ENCOUNTER (OUTPATIENT)
Age: 87
End: 2022-05-05

## 2022-06-03 ENCOUNTER — APPOINTMENT (OUTPATIENT)
Dept: HEART AND VASCULAR | Facility: CLINIC | Age: 87
End: 2022-06-03

## 2022-06-07 ENCOUNTER — FORM ENCOUNTER (OUTPATIENT)
Age: 87
End: 2022-06-07

## 2022-06-14 ENCOUNTER — APPOINTMENT (OUTPATIENT)
Dept: INTERNAL MEDICINE | Facility: CLINIC | Age: 87
End: 2022-06-14
Payer: MEDICARE

## 2022-06-14 VITALS
HEIGHT: 63 IN | TEMPERATURE: 96.8 F | OXYGEN SATURATION: 96 % | WEIGHT: 115 LBS | RESPIRATION RATE: 16 BRPM | SYSTOLIC BLOOD PRESSURE: 112 MMHG | BODY MASS INDEX: 20.38 KG/M2 | HEART RATE: 66 BPM | DIASTOLIC BLOOD PRESSURE: 60 MMHG

## 2022-06-14 DIAGNOSIS — R23.8 OTHER SKIN CHANGES: ICD-10-CM

## 2022-06-14 PROCEDURE — 99213 OFFICE O/P EST LOW 20 MIN: CPT

## 2022-06-14 RX ORDER — KETOCONAZOLE 20.5 MG/ML
2 SHAMPOO, SUSPENSION TOPICAL
Qty: 1 | Refills: 0 | Status: ACTIVE | COMMUNITY
Start: 2022-06-14 | End: 1900-01-01

## 2022-06-14 NOTE — HISTORY OF PRESENT ILLNESS
[FreeTextEntry1] : growth on nose [de-identified] : 89 yo f with cerebral aneurysm, gait instability, OA, PVC,syncope, glaucoma, pAF\par - Here today with his daughter\par - eating much better\par -walking with a  cane in the home.\par  -sleeping well.  \par - nurse helps 2-5 daily and gets out of the house daily.   \par - growth on nose over past 3 months. \par dry scalp

## 2022-06-24 ENCOUNTER — APPOINTMENT (OUTPATIENT)
Dept: HEART AND VASCULAR | Facility: CLINIC | Age: 87
End: 2022-06-24

## 2022-07-20 ENCOUNTER — APPOINTMENT (OUTPATIENT)
Dept: DERMATOLOGY | Facility: CLINIC | Age: 87
End: 2022-07-20

## 2022-07-29 ENCOUNTER — APPOINTMENT (OUTPATIENT)
Dept: HEART AND VASCULAR | Facility: CLINIC | Age: 87
End: 2022-07-29

## 2022-08-04 ENCOUNTER — FORM ENCOUNTER (OUTPATIENT)
Age: 87
End: 2022-08-04

## 2022-09-19 RX ORDER — AMIODARONE HYDROCHLORIDE 200 MG/1
200 TABLET ORAL
Qty: 90 | Refills: 3 | Status: ACTIVE | COMMUNITY
Start: 2022-03-10 | End: 1900-01-01

## 2022-09-19 RX ORDER — METOPROLOL SUCCINATE 50 MG/1
50 TABLET, EXTENDED RELEASE ORAL
Qty: 90 | Refills: 3 | Status: ACTIVE | COMMUNITY
Start: 2016-12-12 | End: 1900-01-01

## 2022-11-16 ENCOUNTER — FORM ENCOUNTER (OUTPATIENT)
Age: 87
End: 2022-11-16

## 2023-05-31 NOTE — PATIENT PROFILE ADULT - FUNCTIONAL SCREEN CURRENT LEVEL: SWALLOWING (IF SCORE 2 OR MORE FOR ANY ITEM, CONSULT REHAB SERVICES), MLM)
[Pathological] : TNM Stage: p [IA] : IA [TTNM] : T1c [NTNM] : 0 [MTNM] : x [de-identified] : Patient completed 8/16 fractions for a total of 2120 cGY to the right breast 0 = swallows foods/liquids without difficulty

## 2024-08-19 NOTE — PATIENT PROFILE ADULT - FALL HARM RISK - FALL HARM RISK
[Normal] : normal [LMP: _____] : LMP: [unfilled] [Eats meals with family] : eats meals with family [Has family members/adults to turn to for help] : has family members/adults to turn to for help [Is permitted and is able to make independent decisions] : Is permitted and is able to make independent decisions [Sleep Concerns] : no sleep concerns [Grade: ____] : Grade: [unfilled] [Eats regular meals including adequate fruits and vegetables] : eats regular meals including adequate fruits and vegetables [Drinks non-sweetened liquids] : drinks non-sweetened liquids  [Calcium source] : calcium source [Has concerns about body or appearance] : does not have concerns about body or appearance [Has friends] : has friends [At least 1 hour of physical activity a day] : does not do at least 1 hour of physical activity a day [Screen time (except homework) less than 2 hours a day] : no screen time (except homework) less than 2 hours a day [Has interests/participates in community activities/volunteers] : has interests/participates in community activities/volunteers. [Uses electronic nicotine delivery system] : does not use electronic nicotine delivery system [Exposure to electronic nicotine delivery system] : no exposure to electronic nicotine delivery system [Uses tobacco] : does not use tobacco Age/Bone Condition [Exposure to tobacco] : no exposure to tobacco [Uses drugs] : uses drugs  [Exposure to drugs] : exposure to drugs [Drinks alcohol] : drinks alcohol [Exposure to alcohol] : exposure to alcohol [No] : No cigarette smoke exposure [Uses safety belts/safety equipment] : uses safety belts/safety equipment  [Impaired/distracted driving] : no impaired/distracted driving [Has peer relationships free of violence] : has peer relationships free of violence [Yes] : Patient has had sexual intercourse. [HIV Screening Declined] : HIV Screening Declined [Has ways to cope with stress] : has ways to cope with stress [Displays self-confidence] : displays self-confidence [Has problems with sleep] : does not have problems with sleep [Gets depressed, anxious, or irritable/has mood swings] : does not get depressed, anxious, or irritable/has mood swings [Has thought about hurting self or considered suicide] : has not thought about hurting self or considered suicide [de-identified] : THC [NO] : No

## (undated) DEVICE — CANNULA PROFORMA STRT HF 4.5